# Patient Record
Sex: MALE | Race: OTHER | HISPANIC OR LATINO | URBAN - METROPOLITAN AREA
[De-identification: names, ages, dates, MRNs, and addresses within clinical notes are randomized per-mention and may not be internally consistent; named-entity substitution may affect disease eponyms.]

---

## 2020-07-16 ENCOUNTER — INPATIENT (INPATIENT)
Facility: HOSPITAL | Age: 70
LOS: 5 days | Discharge: HOME | DRG: 64 | End: 2020-07-22
Attending: INTERNAL MEDICINE | Admitting: INTERNAL MEDICINE
Payer: MEDICARE

## 2020-07-16 VITALS
RESPIRATION RATE: 20 BRPM | HEART RATE: 61 BPM | OXYGEN SATURATION: 99 % | SYSTOLIC BLOOD PRESSURE: 174 MMHG | DIASTOLIC BLOOD PRESSURE: 91 MMHG | TEMPERATURE: 98 F | HEIGHT: 68 IN | WEIGHT: 154.32 LBS

## 2020-07-16 DIAGNOSIS — R20.0 ANESTHESIA OF SKIN: ICD-10-CM

## 2020-07-16 DIAGNOSIS — I10 ESSENTIAL (PRIMARY) HYPERTENSION: ICD-10-CM

## 2020-07-16 DIAGNOSIS — G20 PARKINSON'S DISEASE: ICD-10-CM

## 2020-07-16 DIAGNOSIS — Z29.9 ENCOUNTER FOR PROPHYLACTIC MEASURES, UNSPECIFIED: ICD-10-CM

## 2020-07-16 LAB
ALBUMIN SERPL ELPH-MCNC: 3.7 G/DL — SIGNIFICANT CHANGE UP (ref 3.5–5)
ALP SERPL-CCNC: 56 U/L — SIGNIFICANT CHANGE UP (ref 40–120)
ALT FLD-CCNC: 10 U/L DA — SIGNIFICANT CHANGE UP (ref 10–60)
ANION GAP SERPL CALC-SCNC: 5 MMOL/L — SIGNIFICANT CHANGE UP (ref 5–17)
APTT BLD: 30.1 SEC — SIGNIFICANT CHANGE UP (ref 27.5–35.5)
AST SERPL-CCNC: 17 U/L — SIGNIFICANT CHANGE UP (ref 10–40)
BASOPHILS # BLD AUTO: 0.05 K/UL — SIGNIFICANT CHANGE UP (ref 0–0.2)
BASOPHILS NFR BLD AUTO: 0.8 % — SIGNIFICANT CHANGE UP (ref 0–2)
BILIRUB SERPL-MCNC: 0.5 MG/DL — SIGNIFICANT CHANGE UP (ref 0.2–1.2)
BUN SERPL-MCNC: 13 MG/DL — SIGNIFICANT CHANGE UP (ref 7–18)
CALCIUM SERPL-MCNC: 8.8 MG/DL — SIGNIFICANT CHANGE UP (ref 8.4–10.5)
CHLORIDE SERPL-SCNC: 106 MMOL/L — SIGNIFICANT CHANGE UP (ref 96–108)
CO2 SERPL-SCNC: 29 MMOL/L — SIGNIFICANT CHANGE UP (ref 22–31)
CREAT SERPL-MCNC: 0.95 MG/DL — SIGNIFICANT CHANGE UP (ref 0.5–1.3)
EOSINOPHIL # BLD AUTO: 0.12 K/UL — SIGNIFICANT CHANGE UP (ref 0–0.5)
EOSINOPHIL NFR BLD AUTO: 1.8 % — SIGNIFICANT CHANGE UP (ref 0–6)
GLUCOSE SERPL-MCNC: 102 MG/DL — HIGH (ref 70–99)
HCT VFR BLD CALC: 45.9 % — SIGNIFICANT CHANGE UP (ref 39–50)
HGB BLD-MCNC: 15.4 G/DL — SIGNIFICANT CHANGE UP (ref 13–17)
IMM GRANULOCYTES NFR BLD AUTO: 0.2 % — SIGNIFICANT CHANGE UP (ref 0–1.5)
INR BLD: 1.13 RATIO — SIGNIFICANT CHANGE UP (ref 0.88–1.16)
LYMPHOCYTES # BLD AUTO: 1.4 K/UL — SIGNIFICANT CHANGE UP (ref 1–3.3)
LYMPHOCYTES # BLD AUTO: 21.3 % — SIGNIFICANT CHANGE UP (ref 13–44)
MAGNESIUM SERPL-MCNC: 2.2 MG/DL — SIGNIFICANT CHANGE UP (ref 1.6–2.6)
MCHC RBC-ENTMCNC: 29.2 PG — SIGNIFICANT CHANGE UP (ref 27–34)
MCHC RBC-ENTMCNC: 33.6 GM/DL — SIGNIFICANT CHANGE UP (ref 32–36)
MCV RBC AUTO: 86.9 FL — SIGNIFICANT CHANGE UP (ref 80–100)
MONOCYTES # BLD AUTO: 0.44 K/UL — SIGNIFICANT CHANGE UP (ref 0–0.9)
MONOCYTES NFR BLD AUTO: 6.7 % — SIGNIFICANT CHANGE UP (ref 2–14)
NEUTROPHILS # BLD AUTO: 4.56 K/UL — SIGNIFICANT CHANGE UP (ref 1.8–7.4)
NEUTROPHILS NFR BLD AUTO: 69.2 % — SIGNIFICANT CHANGE UP (ref 43–77)
NRBC # BLD: 0 /100 WBCS — SIGNIFICANT CHANGE UP (ref 0–0)
PLATELET # BLD AUTO: 193 K/UL — SIGNIFICANT CHANGE UP (ref 150–400)
POTASSIUM SERPL-MCNC: 3.9 MMOL/L — SIGNIFICANT CHANGE UP (ref 3.5–5.3)
POTASSIUM SERPL-SCNC: 3.9 MMOL/L — SIGNIFICANT CHANGE UP (ref 3.5–5.3)
PROT SERPL-MCNC: 7.2 G/DL — SIGNIFICANT CHANGE UP (ref 6–8.3)
PROTHROM AB SERPL-ACNC: 13.1 SEC — SIGNIFICANT CHANGE UP (ref 10.6–13.6)
RBC # BLD: 5.28 M/UL — SIGNIFICANT CHANGE UP (ref 4.2–5.8)
RBC # FLD: 12.2 % — SIGNIFICANT CHANGE UP (ref 10.3–14.5)
SARS-COV-2 RNA SPEC QL NAA+PROBE: SIGNIFICANT CHANGE UP
SODIUM SERPL-SCNC: 140 MMOL/L — SIGNIFICANT CHANGE UP (ref 135–145)
TROPONIN I SERPL-MCNC: <0.015 NG/ML — SIGNIFICANT CHANGE UP (ref 0–0.04)
WBC # BLD: 6.58 K/UL — SIGNIFICANT CHANGE UP (ref 3.8–10.5)
WBC # FLD AUTO: 6.58 K/UL — SIGNIFICANT CHANGE UP (ref 3.8–10.5)

## 2020-07-16 PROCEDURE — 70496 CT ANGIOGRAPHY HEAD: CPT | Mod: 26

## 2020-07-16 PROCEDURE — 70498 CT ANGIOGRAPHY NECK: CPT | Mod: 26

## 2020-07-16 PROCEDURE — 99285 EMERGENCY DEPT VISIT HI MDM: CPT

## 2020-07-16 RX ORDER — LOSARTAN POTASSIUM 100 MG/1
25 TABLET, FILM COATED ORAL DAILY
Refills: 0 | Status: DISCONTINUED | OUTPATIENT
Start: 2020-07-16 | End: 2020-07-16

## 2020-07-16 RX ORDER — ASPIRIN/CALCIUM CARB/MAGNESIUM 324 MG
325 TABLET ORAL ONCE
Refills: 0 | Status: COMPLETED | OUTPATIENT
Start: 2020-07-16 | End: 2020-07-16

## 2020-07-16 RX ORDER — ATORVASTATIN CALCIUM 80 MG/1
40 TABLET, FILM COATED ORAL AT BEDTIME
Refills: 0 | Status: DISCONTINUED | OUTPATIENT
Start: 2020-07-16 | End: 2020-07-22

## 2020-07-16 RX ORDER — ENOXAPARIN SODIUM 100 MG/ML
70 INJECTION SUBCUTANEOUS EVERY 12 HOURS
Refills: 0 | Status: DISCONTINUED | OUTPATIENT
Start: 2020-07-16 | End: 2020-07-17

## 2020-07-16 RX ORDER — CLOPIDOGREL BISULFATE 75 MG/1
300 TABLET, FILM COATED ORAL ONCE
Refills: 0 | Status: COMPLETED | OUTPATIENT
Start: 2020-07-16 | End: 2020-07-16

## 2020-07-16 RX ORDER — BENZTROPINE MESYLATE 1 MG
0.5 TABLET ORAL AT BEDTIME
Refills: 0 | Status: DISCONTINUED | OUTPATIENT
Start: 2020-07-16 | End: 2020-07-22

## 2020-07-16 RX ORDER — ENOXAPARIN SODIUM 100 MG/ML
70 INJECTION SUBCUTANEOUS
Refills: 0 | Status: DISCONTINUED | OUTPATIENT
Start: 2020-07-16 | End: 2020-07-16

## 2020-07-16 RX ADMIN — Medication 325 MILLIGRAM(S): at 18:47

## 2020-07-16 RX ADMIN — CLOPIDOGREL BISULFATE 300 MILLIGRAM(S): 75 TABLET, FILM COATED ORAL at 18:47

## 2020-07-16 NOTE — H&P ADULT - PROBLEM SELECTOR PLAN 1
-p/w L arm and leg tingling  -CT Angio H& N: Linear filling defect in the distal right cervical ICA suspicious for a dissection.  -Neurosurgeon Dr. Dey at [facility] was consulted who recommended medical management  -will start on full dose lovenox and statin  Goal -140 mm Hg to prevent stroke  neuro: Dr. Barrow

## 2020-07-16 NOTE — H&P ADULT - PROBLEM SELECTOR PLAN 2
-on losartan and metoprolol at home  -Goal -140 mm Hg to prevent stroke  Pt will need A line for BP monitoring   Hold BP medications

## 2020-07-16 NOTE — ED PROVIDER NOTE - OBJECTIVE STATEMENT
71 y/o male h/o HTN, HLD, and Parkinson's who presents with numbness to the left side that started yesterday around 8pm. Patient thought it would get better but it did not so he came to the ED. No weakness. Patient denies anything similar in the past.

## 2020-07-16 NOTE — ED PROVIDER NOTE - CLINICAL SUMMARY MEDICAL DECISION MAKING FREE TEXT BOX
pt presents with left sided numbness since yesterday.  Will check labs, CTA head and neck, and reassess.

## 2020-07-16 NOTE — ED PROVIDER NOTE - PROGRESS NOTE DETAILS
CTA shows questionable dissection.  case discussed with transfer center -  Neurosurgery - Dr. Dey/  Recommend aspirin, plavix, and MRI as inpatient to r/o stroke.  Possible dissection is not flow limiting. CTA shows questionable dissection.  case discussed with transfer center -  Neurosurgery - Dr. Dey.   Recommend aspirin, plavix, and MRI as inpatient to r/o stroke.  Possible dissection seen on CT is not flow limiting.

## 2020-07-16 NOTE — H&P ADULT - ASSESSMENT
69 y/o male with PMH of HTN, HLD and Parkinson disease come to ED s with numbness to the left arm that started yesterday around 8pm. Patient thought it would get better but it did not so he came to the ED. Pt denies any upper or lower extremity weakness, slurring speech, confusion blurred vison, chest pain, palpitations, fever, cough or any other acute complaints.     In ED, /65, HR 53  EKG: sinus bradycardia  CT Angio H& N: Linear filling defect in the distal right cervical ICA suspicious for a dissection.  Neurosurgeon Dr. Dey at [facility] was consulted who recommended medical management.     Pt will be admitted for distal right cervical ICA dissection.

## 2020-07-16 NOTE — ED ADULT NURSE NOTE - OBJECTIVE STATEMENT
Numbness to whole of left side x 8 pm last night just after dinner. Also c/o dizziness, slurred speech. Has h/o Parkinson.

## 2020-07-16 NOTE — ED ADULT TRIAGE NOTE - CHIEF COMPLAINT QUOTE
Numbness to whole of left side x 8 pm last night just after dinner. Also c/o dizziness, slurred speech. Has h/o Parkinsons.

## 2020-07-16 NOTE — ED PROVIDER NOTE - TELEPHONIC ID NUMBER OF THE INTERPRETER
Type 2 diabetes mellitus with ketoacidosis without coma, unspecified long term insulin use status Diabetes mellitus with hyperglycemia Diabetes mellitus with hyperglycemia [id number]

## 2020-07-16 NOTE — H&P ADULT - HISTORY OF PRESENT ILLNESS
69 y/o male with PMH of HTN, HLD and Parkinson disease come to ED s with numbness to the left arm that started yesterday around 8pm. Patient thought it would get better but it did not so he came to the ED. Pt denies any recent trauma to neck. Pt denies any upper or lower extremity weakness, slurring speech, confusion blurred vison, chest pain, palpitations, fever, cough or any other acute complaints.   In ED, /65, HR 53

## 2020-07-17 LAB
A1C WITH ESTIMATED AVERAGE GLUCOSE RESULT: 5.8 % — HIGH (ref 4–5.6)
ALBUMIN SERPL ELPH-MCNC: 3.5 G/DL — SIGNIFICANT CHANGE UP (ref 3.5–5)
ALP SERPL-CCNC: 52 U/L — SIGNIFICANT CHANGE UP (ref 40–120)
ALT FLD-CCNC: 22 U/L DA — SIGNIFICANT CHANGE UP (ref 10–60)
ANION GAP SERPL CALC-SCNC: 10 MMOL/L — SIGNIFICANT CHANGE UP (ref 5–17)
AST SERPL-CCNC: 19 U/L — SIGNIFICANT CHANGE UP (ref 10–40)
BASOPHILS # BLD AUTO: 0.04 K/UL — SIGNIFICANT CHANGE UP (ref 0–0.2)
BASOPHILS NFR BLD AUTO: 0.6 % — SIGNIFICANT CHANGE UP (ref 0–2)
BILIRUB SERPL-MCNC: 0.4 MG/DL — SIGNIFICANT CHANGE UP (ref 0.2–1.2)
BUN SERPL-MCNC: 14 MG/DL — SIGNIFICANT CHANGE UP (ref 7–18)
CALCIUM SERPL-MCNC: 8.3 MG/DL — LOW (ref 8.4–10.5)
CHLORIDE SERPL-SCNC: 105 MMOL/L — SIGNIFICANT CHANGE UP (ref 96–108)
CHOLEST SERPL-MCNC: 136 MG/DL — SIGNIFICANT CHANGE UP (ref 10–199)
CO2 SERPL-SCNC: 23 MMOL/L — SIGNIFICANT CHANGE UP (ref 22–31)
CREAT SERPL-MCNC: 0.95 MG/DL — SIGNIFICANT CHANGE UP (ref 0.5–1.3)
EOSINOPHIL # BLD AUTO: 0.08 K/UL — SIGNIFICANT CHANGE UP (ref 0–0.5)
EOSINOPHIL NFR BLD AUTO: 1.2 % — SIGNIFICANT CHANGE UP (ref 0–6)
ESTIMATED AVERAGE GLUCOSE: 120 MG/DL — HIGH (ref 68–114)
FOLATE SERPL-MCNC: 7.2 NG/ML — SIGNIFICANT CHANGE UP
GLUCOSE BLDC GLUCOMTR-MCNC: 115 MG/DL — HIGH (ref 70–99)
GLUCOSE SERPL-MCNC: 129 MG/DL — HIGH (ref 70–99)
HCT VFR BLD CALC: 46.7 % — SIGNIFICANT CHANGE UP (ref 39–50)
HDLC SERPL-MCNC: 45 MG/DL — SIGNIFICANT CHANGE UP
HGB BLD-MCNC: 15.5 G/DL — SIGNIFICANT CHANGE UP (ref 13–17)
IMM GRANULOCYTES NFR BLD AUTO: 0.2 % — SIGNIFICANT CHANGE UP (ref 0–1.5)
INR BLD: 1.16 RATIO — SIGNIFICANT CHANGE UP (ref 0.88–1.16)
LIPID PNL WITH DIRECT LDL SERPL: 78 MG/DL — SIGNIFICANT CHANGE UP
LYMPHOCYTES # BLD AUTO: 1.02 K/UL — SIGNIFICANT CHANGE UP (ref 1–3.3)
LYMPHOCYTES # BLD AUTO: 15.6 % — SIGNIFICANT CHANGE UP (ref 13–44)
MAGNESIUM SERPL-MCNC: 2.2 MG/DL — SIGNIFICANT CHANGE UP (ref 1.6–2.6)
MCHC RBC-ENTMCNC: 29 PG — SIGNIFICANT CHANGE UP (ref 27–34)
MCHC RBC-ENTMCNC: 33.2 GM/DL — SIGNIFICANT CHANGE UP (ref 32–36)
MCV RBC AUTO: 87.3 FL — SIGNIFICANT CHANGE UP (ref 80–100)
MONOCYTES # BLD AUTO: 0.38 K/UL — SIGNIFICANT CHANGE UP (ref 0–0.9)
MONOCYTES NFR BLD AUTO: 5.8 % — SIGNIFICANT CHANGE UP (ref 2–14)
NEUTROPHILS # BLD AUTO: 5 K/UL — SIGNIFICANT CHANGE UP (ref 1.8–7.4)
NEUTROPHILS NFR BLD AUTO: 76.6 % — SIGNIFICANT CHANGE UP (ref 43–77)
NRBC # BLD: 0 /100 WBCS — SIGNIFICANT CHANGE UP (ref 0–0)
PHOSPHATE SERPL-MCNC: 2.8 MG/DL — SIGNIFICANT CHANGE UP (ref 2.5–4.5)
PLATELET # BLD AUTO: 193 K/UL — SIGNIFICANT CHANGE UP (ref 150–400)
POTASSIUM SERPL-MCNC: 4 MMOL/L — SIGNIFICANT CHANGE UP (ref 3.5–5.3)
POTASSIUM SERPL-SCNC: 4 MMOL/L — SIGNIFICANT CHANGE UP (ref 3.5–5.3)
PROT SERPL-MCNC: 7.2 G/DL — SIGNIFICANT CHANGE UP (ref 6–8.3)
PROTHROM AB SERPL-ACNC: 13.5 SEC — SIGNIFICANT CHANGE UP (ref 10.6–13.6)
RBC # BLD: 5.35 M/UL — SIGNIFICANT CHANGE UP (ref 4.2–5.8)
RBC # FLD: 12.2 % — SIGNIFICANT CHANGE UP (ref 10.3–14.5)
SARS-COV-2 IGG SERPL QL IA: NEGATIVE — SIGNIFICANT CHANGE UP
SARS-COV-2 IGM SERPL IA-ACNC: 4.55 AU/ML — SIGNIFICANT CHANGE UP
SODIUM SERPL-SCNC: 138 MMOL/L — SIGNIFICANT CHANGE UP (ref 135–145)
TOTAL CHOLESTEROL/HDL RATIO MEASUREMENT: 3 RATIO — LOW (ref 3.4–9.6)
TRIGL SERPL-MCNC: 64 MG/DL — SIGNIFICANT CHANGE UP (ref 10–149)
TSH SERPL-MCNC: 1.87 UU/ML — SIGNIFICANT CHANGE UP (ref 0.34–4.82)
VIT B12 SERPL-MCNC: 633 PG/ML — SIGNIFICANT CHANGE UP (ref 232–1245)
WBC # BLD: 6.53 K/UL — SIGNIFICANT CHANGE UP (ref 3.8–10.5)
WBC # FLD AUTO: 6.53 K/UL — SIGNIFICANT CHANGE UP (ref 3.8–10.5)

## 2020-07-17 PROCEDURE — 93880 EXTRACRANIAL BILAT STUDY: CPT | Mod: 26

## 2020-07-17 PROCEDURE — 99222 1ST HOSP IP/OBS MODERATE 55: CPT | Mod: GC

## 2020-07-17 PROCEDURE — 99291 CRITICAL CARE FIRST HOUR: CPT

## 2020-07-17 PROCEDURE — 99221 1ST HOSP IP/OBS SF/LOW 40: CPT

## 2020-07-17 PROCEDURE — 71045 X-RAY EXAM CHEST 1 VIEW: CPT | Mod: 26

## 2020-07-17 RX ORDER — CARBIDOPA AND LEVODOPA 25; 100 MG/1; MG/1
3 TABLET ORAL
Refills: 0 | Status: DISCONTINUED | OUTPATIENT
Start: 2020-07-17 | End: 2020-07-22

## 2020-07-17 RX ORDER — SODIUM CHLORIDE 9 MG/ML
1000 INJECTION INTRAMUSCULAR; INTRAVENOUS; SUBCUTANEOUS
Refills: 0 | Status: DISCONTINUED | OUTPATIENT
Start: 2020-07-17 | End: 2020-07-18

## 2020-07-17 RX ORDER — CARBIDOPA AND LEVODOPA 25; 100 MG/1; MG/1
3 TABLET ORAL THREE TIMES A DAY
Refills: 0 | Status: DISCONTINUED | OUTPATIENT
Start: 2020-07-17 | End: 2020-07-17

## 2020-07-17 RX ORDER — KETOROLAC TROMETHAMINE 30 MG/ML
15 SYRINGE (ML) INJECTION ONCE
Refills: 0 | Status: DISCONTINUED | OUTPATIENT
Start: 2020-07-17 | End: 2020-07-17

## 2020-07-17 RX ORDER — PHENYLEPHRINE HYDROCHLORIDE 10 MG/ML
0.1 INJECTION INTRAVENOUS
Qty: 40 | Refills: 0 | Status: DISCONTINUED | OUTPATIENT
Start: 2020-07-17 | End: 2020-07-19

## 2020-07-17 RX ORDER — ACETAMINOPHEN 500 MG
650 TABLET ORAL EVERY 6 HOURS
Refills: 0 | Status: DISCONTINUED | OUTPATIENT
Start: 2020-07-17 | End: 2020-07-22

## 2020-07-17 RX ORDER — SODIUM CHLORIDE 9 MG/ML
1000 INJECTION INTRAMUSCULAR; INTRAVENOUS; SUBCUTANEOUS
Refills: 0 | Status: DISCONTINUED | OUTPATIENT
Start: 2020-07-17 | End: 2020-07-17

## 2020-07-17 RX ORDER — ENOXAPARIN SODIUM 100 MG/ML
70 INJECTION SUBCUTANEOUS EVERY 12 HOURS
Refills: 0 | Status: DISCONTINUED | OUTPATIENT
Start: 2020-07-17 | End: 2020-07-18

## 2020-07-17 RX ORDER — CHLORHEXIDINE GLUCONATE 213 G/1000ML
1 SOLUTION TOPICAL
Refills: 0 | Status: DISCONTINUED | OUTPATIENT
Start: 2020-07-17 | End: 2020-07-21

## 2020-07-17 RX ORDER — ACETAMINOPHEN 500 MG
1000 TABLET ORAL ONCE
Refills: 0 | Status: COMPLETED | OUTPATIENT
Start: 2020-07-17 | End: 2020-07-17

## 2020-07-17 RX ORDER — PANTOPRAZOLE SODIUM 20 MG/1
40 TABLET, DELAYED RELEASE ORAL
Refills: 0 | Status: DISCONTINUED | OUTPATIENT
Start: 2020-07-17 | End: 2020-07-22

## 2020-07-17 RX ADMIN — Medication 15 MILLIGRAM(S): at 02:00

## 2020-07-17 RX ADMIN — CHLORHEXIDINE GLUCONATE 1 APPLICATION(S): 213 SOLUTION TOPICAL at 05:13

## 2020-07-17 RX ADMIN — ATORVASTATIN CALCIUM 40 MILLIGRAM(S): 80 TABLET, FILM COATED ORAL at 21:09

## 2020-07-17 RX ADMIN — CARBIDOPA AND LEVODOPA 3 CAPSULE(S): 25; 100 TABLET ORAL at 05:52

## 2020-07-17 RX ADMIN — PANTOPRAZOLE SODIUM 40 MILLIGRAM(S): 20 TABLET, DELAYED RELEASE ORAL at 05:52

## 2020-07-17 RX ADMIN — CARBIDOPA AND LEVODOPA 3 CAPSULE(S): 25; 100 TABLET ORAL at 14:09

## 2020-07-17 RX ADMIN — Medication 400 MILLIGRAM(S): at 14:09

## 2020-07-17 RX ADMIN — Medication 0.5 MILLIGRAM(S): at 21:09

## 2020-07-17 RX ADMIN — ENOXAPARIN SODIUM 70 MILLIGRAM(S): 100 INJECTION SUBCUTANEOUS at 15:00

## 2020-07-17 RX ADMIN — CARBIDOPA AND LEVODOPA 3 CAPSULE(S): 25; 100 TABLET ORAL at 18:51

## 2020-07-17 RX ADMIN — Medication 1000 MILLIGRAM(S): at 15:00

## 2020-07-17 RX ADMIN — Medication 15 MILLIGRAM(S): at 01:29

## 2020-07-17 RX ADMIN — PHENYLEPHRINE HYDROCHLORIDE 2.63 MICROGRAM(S)/KG/MIN: 10 INJECTION INTRAVENOUS at 09:40

## 2020-07-17 RX ADMIN — PHENYLEPHRINE HYDROCHLORIDE 2.63 MICROGRAM(S)/KG/MIN: 10 INJECTION INTRAVENOUS at 09:30

## 2020-07-17 NOTE — CONSULT NOTE ADULT - ASSESSMENT
A/P: Carotid dissection SHIREEN with stroke    - No IV tpa given because beyond time window and non-disabling deficits  - Lovenox initially full anticoagulation protocol  - ASA/ PLavix after 48-72 h Lovenox  - Maintain permissive BP: parameters -130. Low pulse pressure  - Statin  - Dysphagia screen  - DVT prophylaxis not needed as he is on Lovenox  - MRI/A brain  - MRA carotids after 72 h  -  Total Critical Care Time spent:

## 2020-07-17 NOTE — PROGRESS NOTE ADULT - SUBJECTIVE AND OBJECTIVE BOX
INTERVAL HPI/OVERNIGHT EVENTS: ***    PRESSORS: [ ] YES [ ] NO  WHICH:        Hematalogic:  enoxaparin Injectable 70 milliGRAM(s) SubCutaneous every 12 hours    Other:  acetaminophen   Tablet .. 650 milliGRAM(s) Oral every 6 hours PRN  atorvastatin 40 milliGRAM(s) Oral at bedtime  benztropine 0.5 milliGRAM(s) Oral at bedtime  carbidopa 48.75 mG/levodopa 195 mG ER 3 Capsule(s) Oral three times a day  chlorhexidine 2% Cloths 1 Application(s) Topical <User Schedule>  pantoprazole    Tablet 40 milliGRAM(s) Oral before breakfast  sodium chloride 0.9%. 1000 milliLiter(s) IV Continuous <Continuous>    acetaminophen   Tablet .. 650 milliGRAM(s) Oral every 6 hours PRN  atorvastatin 40 milliGRAM(s) Oral at bedtime  benztropine 0.5 milliGRAM(s) Oral at bedtime  carbidopa 48.75 mG/levodopa 195 mG ER 3 Capsule(s) Oral three times a day  chlorhexidine 2% Cloths 1 Application(s) Topical <User Schedule>  enoxaparin Injectable 70 milliGRAM(s) SubCutaneous every 12 hours  pantoprazole    Tablet 40 milliGRAM(s) Oral before breakfast  sodium chloride 0.9%. 1000 milliLiter(s) IV Continuous <Continuous>    Drug Dosing Weight  Height (cm): 172.72 (16 Jul 2020 14:26)  Weight (kg): 70 (16 Jul 2020 14:26)  BMI (kg/m2): 23.5 (16 Jul 2020 14:26)  BSA (m2): 1.83 (16 Jul 2020 14:26)    CENTRAL LINE: [ ] YES [ ] NO  LOCATION:   DATE INSERTED:  REMOVE: [ ] YES [ ] NO  EXPLAIN:    CHAKRABORTY: [ ] YES [ ] NO    DATE INSERTED:  REMOVE:  [ ] YES [ ] NO  EXPLAIN:    A-LINE:  [ ] YES [ ] NO  LOCATION:   DATE INSERTED:  REMOVE:  [ ] YES [ ] NO  EXPLAIN:    PMH -reviewed admission note, no change since admission  Heart faliure: acute [ ] chronic [ ] acute or chronic [ ] diastolic [ ] systolic [ ] combied systolic and diastolic[ ]  AYALA: ATN[ ] renal medullary necrosis [ ] CKD I [ ]CKDII [ ]CKD III [ ]CKD IV [ ]CKD V [ ]Other pathological lesions [ ]  Abdominal Nutrition Status: malnutrition [ ] cachexia [ ] morbid obesity/BMI=40 [ ] Supplement ordered [___________]     ICU Vital Signs Last 24 Hrs  T(C): 36.7 (17 Jul 2020 05:00), Max: 37 (16 Jul 2020 17:01)  T(F): 98 (17 Jul 2020 05:00), Max: 98.6 (16 Jul 2020 17:01)  HR: 62 (17 Jul 2020 09:00) (53 - 88)  BP: 156/73 (17 Jul 2020 08:00) (148/72 - 179/83)  BP(mean): 92 (17 Jul 2020 08:00) (90 - 108)  ABP: 130/66 (17 Jul 2020 09:00) (130/66 - 202/104)  ABP(mean): 90 (17 Jul 2020 09:00) (70 - 146)  RR: 14 (17 Jul 2020 09:00) (11 - 22)  SpO2: 98% (17 Jul 2020 09:00) (93% - 100%)                  >>> <<<    PHYSICAL EXAM:    GENERAL: NAD, well-groomed, well-developed  HEAD:  Atraumatic, Normocephalic  EYES: EOMI, PERRLA, conjunctiva and sclera clear  ENMT: No tonsillar erythema, exudates, or enlargement; Moist mucous membranes, Good dentition, No lesions  NECK: Supple, normal appearance, No JVD; Normal thyroid; Trachea midline  NERVOUS SYSTEM:  Alert & Oriented X3, Good concentration; Motor Strength 5/5 B/L upper and lower extremities; DTRs 2+ intact and symmetric  CHEST/LUNG: No chest deformity; Normal percussion bilaterally; No rales, rhonchi, wheezing   HEART: Regular rate and rhythm; No murmurs, rubs, or gallops  ABDOMEN: Soft, Nontender, Nondistended; Bowel sounds present  EXTREMITIES:  2+ Peripheral Pulses, No clubbing, cyanosis, or edema  LYMPH: No lymphadenopathy noted  SKIN: No rashes or lesions; Good capillary refill      LABS:  CBC Full  -  ( 17 Jul 2020 06:53 )  WBC Count : 6.53 K/uL  RBC Count : 5.35 M/uL  Hemoglobin : 15.5 g/dL  Hematocrit : 46.7 %  Platelet Count - Automated : 193 K/uL  Mean Cell Volume : 87.3 fl  Mean Cell Hemoglobin : 29.0 pg  Mean Cell Hemoglobin Concentration : 33.2 gm/dL  Auto Neutrophil # : 5.00 K/uL  Auto Lymphocyte # : 1.02 K/uL  Auto Monocyte # : 0.38 K/uL  Auto Eosinophil # : 0.08 K/uL  Auto Basophil # : 0.04 K/uL  Auto Neutrophil % : 76.6 %  Auto Lymphocyte % : 15.6 %  Auto Monocyte % : 5.8 %  Auto Eosinophil % : 1.2 %  Auto Basophil % : 0.6 %    07-17    138  |  105  |  14  ----------------------------<  129<H>  4.0   |  23  |  0.95    Ca    8.3<L>      17 Jul 2020 06:53  Phos  2.8     07-17  Mg     2.2     07-17    TPro  7.2  /  Alb  3.5  /  TBili  0.4  /  DBili  x   /  AST  19  /  ALT  22  /  AlkPhos  52  07-17    PT/INR - ( 17 Jul 2020 06:53 )   PT: 13.5 sec;   INR: 1.16 ratio         PTT - ( 16 Jul 2020 15:43 )  PTT:30.1 sec        RADIOLOGY & ADDITIONAL STUDIES REVIEWED:  ***    [ ]GOALS OF CARE DISCUSSION WITH PATIENT/FAMILY/PROXY:    CRITICAL CARE TIME SPENT: 35 minutes INTERVAL HPI/OVERNIGHT EVENTS: MAP monitored overnight > 100. Pheny started in am as pt's MAP <100     PRESSORS: [ x] YES [ ] NO  WHICH: Pheny         Hematalogic:  enoxaparin Injectable 70 milliGRAM(s) SubCutaneous every 12 hours    Other:  acetaminophen   Tablet .. 650 milliGRAM(s) Oral every 6 hours PRN  atorvastatin 40 milliGRAM(s) Oral at bedtime  benztropine 0.5 milliGRAM(s) Oral at bedtime  carbidopa 48.75 mG/levodopa 195 mG ER 3 Capsule(s) Oral three times a day  chlorhexidine 2% Cloths 1 Application(s) Topical <User Schedule>  pantoprazole    Tablet 40 milliGRAM(s) Oral before breakfast  sodium chloride 0.9%. 1000 milliLiter(s) IV Continuous <Continuous>    acetaminophen   Tablet .. 650 milliGRAM(s) Oral every 6 hours PRN  atorvastatin 40 milliGRAM(s) Oral at bedtime  benztropine 0.5 milliGRAM(s) Oral at bedtime  carbidopa 48.75 mG/levodopa 195 mG ER 3 Capsule(s) Oral three times a day  chlorhexidine 2% Cloths 1 Application(s) Topical <User Schedule>  enoxaparin Injectable 70 milliGRAM(s) SubCutaneous every 12 hours  pantoprazole    Tablet 40 milliGRAM(s) Oral before breakfast  sodium chloride 0.9%. 1000 milliLiter(s) IV Continuous <Continuous>    Drug Dosing Weight  Height (cm): 172.72 (16 Jul 2020 14:26)  Weight (kg): 70 (16 Jul 2020 14:26)  BMI (kg/m2): 23.5 (16 Jul 2020 14:26)  BSA (m2): 1.83 (16 Jul 2020 14:26)    CENTRAL LINE: [ ] YES [ x] NO  LOCATION:   DATE INSERTED:  REMOVE: [ ] YES [ ] NO  EXPLAIN:    CHAKRABORTY: [ ] YES [x ] NO    DATE INSERTED:  REMOVE:  [ ] YES [ ] NO  EXPLAIN:    A-LINE:  [ x] YES [ ] NO  LOCATION:  right radial  DATE INSERTED:7/17  REMOVE:  [ ] YES [ ] NO  EXPLAIN:      ICU Vital Signs Last 24 Hrs  T(C): 36.7 (17 Jul 2020 05:00), Max: 37 (16 Jul 2020 17:01)  T(F): 98 (17 Jul 2020 05:00), Max: 98.6 (16 Jul 2020 17:01)  HR: 62 (17 Jul 2020 09:00) (53 - 88)  BP: 156/73 (17 Jul 2020 08:00) (148/72 - 179/83)  BP(mean): 92 (17 Jul 2020 08:00) (90 - 108)  ABP: 130/66 (17 Jul 2020 09:00) (130/66 - 202/104)  ABP(mean): 90 (17 Jul 2020 09:00) (70 - 146)  RR: 14 (17 Jul 2020 09:00) (11 - 22)  SpO2: 98% (17 Jul 2020 09:00) (93% - 100%)                  PHYSICAL EXAM:    GENERAL: NAD, well-groomed, well-developed  HEAD:  Atraumatic, Normocephalic  EYES: EOMI, PERRLA, conjunctiva and sclera clear  ENMT: No tonsillar erythema, exudates, or enlargement; Moist mucous membranes, Good dentition, No lesions  NECK: Supple, normal appearance, No JVD; Normal thyroid; Trachea midline  NERVOUS SYSTEM:  Alert & Oriented X3, Good concentration; mild tremor noted. numbness in LUE   CHEST/LUNG: No chest deformity; Normal percussion bilaterally; No rales, rhonchi, wheezing   HEART: Regular rate and rhythm; No murmurs, rubs, or gallops  ABDOMEN: Soft, Nontender, Nondistended; Bowel sounds present  EXTREMITIES:  LUE 4/5 STRENGTH, bLE 5/5.   LYMPH: No lymphadenopathy noted  SKIN: No rashes or lesions; Good capillary refill      LABS:  CBC Full  -  ( 17 Jul 2020 06:53 )  WBC Count : 6.53 K/uL  RBC Count : 5.35 M/uL  Hemoglobin : 15.5 g/dL  Hematocrit : 46.7 %  Platelet Count - Automated : 193 K/uL  Mean Cell Volume : 87.3 fl  Mean Cell Hemoglobin : 29.0 pg  Mean Cell Hemoglobin Concentration : 33.2 gm/dL  Auto Neutrophil # : 5.00 K/uL  Auto Lymphocyte # : 1.02 K/uL  Auto Monocyte # : 0.38 K/uL  Auto Eosinophil # : 0.08 K/uL  Auto Basophil # : 0.04 K/uL  Auto Neutrophil % : 76.6 %  Auto Lymphocyte % : 15.6 %  Auto Monocyte % : 5.8 %  Auto Eosinophil % : 1.2 %  Auto Basophil % : 0.6 %    07-17    138  |  105  |  14  ----------------------------<  129<H>  4.0   |  23  |  0.95    Ca    8.3<L>      17 Jul 2020 06:53  Phos  2.8     07-17  Mg     2.2     07-17    TPro  7.2  /  Alb  3.5  /  TBili  0.4  /  DBili  x   /  AST  19  /  ALT  22  /  AlkPhos  52  07-17    PT/INR - ( 17 Jul 2020 06:53 )   PT: 13.5 sec;   INR: 1.16 ratio         PTT - ( 16 Jul 2020 15:43 )  PTT:30.1 sec        RADIOLOGY & ADDITIONAL STUDIES REVIEWED:  ***    [ ]GOALS OF CARE DISCUSSION WITH PATIENT/FAMILY/PROXY:    CRITICAL CARE TIME SPENT: 35 minutes

## 2020-07-17 NOTE — PROGRESS NOTE ADULT - SUBJECTIVE AND OBJECTIVE BOX
Patient is a 70y old  Male who presents with a chief complaint of right cervical ICA dissection (17 Jul 2020 00:37)    pt seen in icu [  ], reg med floor [   ], bed [  ], chair at bedside [   ], a+o x3 [  ], lethargic [  ],  nad [  ]    bello [  ], ngt [  ], peg [  ], et tube [  ], cent line [  ], picc line [  ]        Allergies    No Known Allergies        Vitals    T(F): 98 (07-17-20 @ 05:00), Max: 98.6 (07-16-20 @ 17:01)  HR: 68 (07-17-20 @ 05:30) (53 - 88)  BP: 162/77 (07-17-20 @ 04:00) (148/72 - 179/83)  RR: 14 (07-17-20 @ 05:30) (12 - 22)  SpO2: 97% (07-17-20 @ 05:30) (93% - 100%)  Wt(kg): --  CAPILLARY BLOOD GLUCOSE      POCT Blood Glucose.: 111 mg/dL (16 Jul 2020 14:50)      Labs                          15.4   6.58  )-----------( 193      ( 16 Jul 2020 15:43 )             45.9       07-16    140  |  106  |  13  ----------------------------<  102<H>  3.9   |  29  |  0.95    Ca    8.8      16 Jul 2020 15:43  Mg     2.2     07-16    TPro  7.2  /  Alb  3.7  /  TBili  0.5  /  DBili  x   /  AST  17  /  ALT  10  /  AlkPhos  56  07-16      CARDIAC MARKERS ( 16 Jul 2020 15:43 )  <0.015 ng/mL / x     / x     / x     / x                Radiology Results      Meds    MEDICATIONS  (STANDING):  atorvastatin 40 milliGRAM(s) Oral at bedtime  benztropine 0.5 milliGRAM(s) Oral at bedtime  carbidopa 48.75 mG/levodopa 195 mG ER 3 Capsule(s) Oral three times a day  chlorhexidine 2% Cloths 1 Application(s) Topical <User Schedule>  enoxaparin Injectable 70 milliGRAM(s) SubCutaneous every 12 hours  pantoprazole    Tablet 40 milliGRAM(s) Oral before breakfast  sodium chloride 0.9%. 1000 milliLiter(s) (100 mL/Hr) IV Continuous <Continuous>      MEDICATIONS  (PRN):  acetaminophen   Tablet .. 650 milliGRAM(s) Oral every 6 hours PRN Temp greater or equal to 38C (100.4F), Mild Pain (1 - 3), Moderate Pain (4 - 6)      Physical Exam    Neuro :  no focal deficits  Respiratory: CTA B/L  CV: RRR, S1S2, no murmurs,   Abdominal: Soft, NT, ND +BS,  Extremities: No edema, + peripheral pulses    ASSESSMENT    Anesthesia of skin  Parkinson disease  Other hyperlipidemia  Essential hypertension  No significant past surgical history      PLAN 69 y/o male with PMH of HTN, HLD and Parkinson disease come to ED s with numbness to the left arm that started yesterday around 8pm. Patient thought it would get better but it did not so he came to the ED. Pt denies any recent trauma to neck. Pt denies any upper or lower extremity weakness, slurring speech, confusion blurred vison, chest pain, palpitations, fever, cough or any other acute complaints.   In ED, /65, HR 53  CTA head/neck shows evidence of right distal cervical ICA dissection patient remains symptomatic at this time w/ left sided weakness and numbness  neurosurgery (Dr. Dey) advised against transfer of patient; advised medical management case discussed by MAR with Dr. Barrow -   patient admitted to ICU for strict BP monitoring      Review of Systems:  Other Review of Systems: All other review of systems negative, except as noted in HPI	      pt seen in icu [x  ], reg med floor [   ], bed [ x ], chair at bedside [   ], a+o x3 [ x ], lethargic [  ],  nad [ x ]            Allergies    No Known Allergies        Vitals    T(F): 98 (07-17-20 @ 05:00), Max: 98.6 (07-16-20 @ 17:01)  HR: 68 (07-17-20 @ 05:30) (53 - 88)  BP: 162/77 (07-17-20 @ 04:00) (148/72 - 179/83)  RR: 14 (07-17-20 @ 05:30) (12 - 22)  SpO2: 97% (07-17-20 @ 05:30) (93% - 100%)  Wt(kg): --  CAPILLARY BLOOD GLUCOSE      POCT Blood Glucose.: 111 mg/dL (16 Jul 2020 14:50)      Labs                          15.4   6.58  )-----------( 193      ( 16 Jul 2020 15:43 )             45.9       07-16    140  |  106  |  13  ----------------------------<  102<H>  3.9   |  29  |  0.95    Ca    8.8      16 Jul 2020 15:43  Mg     2.2     07-16    TPro  7.2  /  Alb  3.7  /  TBili  0.5  /  DBili  x   /  AST  17  /  ALT  10  /  AlkPhos  56  07-16      CARDIAC MARKERS ( 16 Jul 2020 15:43 )  <0.015 ng/mL / x     / x     / x     / x                Radiology Results      < from: CT Angio Head w/ IV Cont (07.16.20 @ 18:00) >  CT BRAIN WITHOUT CONTRAST:    There is no acute intracranial hemorrhage or mass effect. The ventricles and sulci are normal in size for patient's age.     There is no extraaxial fluid collection.     There is nodisplaced calvarial fracture. The visualized orbits are within normal limits. Polyp or retention cyst in the right sphenoid sinus. The mastoid air cells are well aerated.    CT ANGIOGRAPHY NECK:     Thoracic aorta and branch vessels: Patent.  Right carotid system: Linear filling defect in the distal right cervical ICA suspicious for a dissection (13:258, 18:59). No hemodynamically significant stenosis using NASCET criteria.   Left carotid system: Patent.  No hemodynamically significant stenosis using NASCET criteria.  No evidence of dissection.  Vertebral arteries: No focal stenosis or dissection.     Soft tissues of the neck: Unremarkable.  Visualized spine: Degenerative changes in the spine.  Visualized upper chest: Unremarkable.    CT ANGIOGRAPHY BRAIN:    Internal carotid arteries: Patent.   Anterior cerebral arteries: Patent.   Middle cerebral arteries: Patent.    Posterior cerebral arteries: Patent.   Vertebrobasilar: Patent.  Branch vasculature of the posterior circulation is within normal limits.     Vascular lesions: No evidence of intracranial aneurysm or large vascular malformation, within limits of CT technique.      IMPRESSION:     Brain CT without contrast:   No evidence of intracranial hemorrhage or mass effect.    CT angiography neck:   1.  Linear filling defect in the distal right cervical ICA suspicious for a dissection.  2.  No hemodynamically significant stenosis of the bilateral cervical ICAs using NASCET criteria.  Patent vertebral arteries.      CT angiography brain: No large vessel occlusion. No evidence of aneurysm.    < end of copied text >        Meds    MEDICATIONS  (STANDING):  atorvastatin 40 milliGRAM(s) Oral at bedtime  benztropine 0.5 milliGRAM(s) Oral at bedtime  carbidopa 48.75 mG/levodopa 195 mG ER 3 Capsule(s) Oral three times a day  chlorhexidine 2% Cloths 1 Application(s) Topical <User Schedule>  enoxaparin Injectable 70 milliGRAM(s) SubCutaneous every 12 hours  pantoprazole    Tablet 40 milliGRAM(s) Oral before breakfast  sodium chloride 0.9%. 1000 milliLiter(s) (100 mL/Hr) IV Continuous <Continuous>      MEDICATIONS  (PRN):  acetaminophen   Tablet .. 650 milliGRAM(s) Oral every 6 hours PRN Temp greater or equal to 38C (100.4F), Mild Pain (1 - 3), Moderate Pain (4 - 6)      Physical Exam    Neuro :  no focal deficits  Respiratory: CTA B/L  CV: RRR, S1S2, no murmurs,   Abdominal: Soft, NT, ND +BS,  Extremities: No edema, + peripheral pulses    ASSESSMENT    Anesthesia of skin   right cervical ica disection  h/o Parkinson disease  Other hyperlipidemia  Essential hypertension        PLAN    cont icu monitoring  neuro cons   vascular cons  a-line placed on 7/17  goal MAP between 100-140 for adequate cerebral perfusion   at present; if below 100, add IV fluids and potentially pressors if needed  permissive hypertension  holding home antihypertensives   full dose lovenox as advised by neuro  GI ppx with protonix  COVID-19 negative x1   cont current meds  cont mgmt as per icu

## 2020-07-17 NOTE — CONSULT NOTE ADULT - ATTENDING COMMENTS
Pt. presented with L arm numbness  CTA shows distal R ICA dissection  neurologic symptoms are stable  agree with full dose A/c  no indication for surgical interventions
MICU ATTENDING. I have personally seen and examined the pt. I was physically present for the key elements service provided. I agree with above history, physical and plan which I edited where appropriate. I total spent 35 min critical care time.   69 y/o with PMH of HTN and HLD presented with sudden onset l side weakness and numbness. CTA R ICA dissection. Admitted to ICU for close monitoring.    A/P CVA   R ICA dissection   HTN  MICU   BP monitor  Lovenox as per Neuro   neuro input appreciated   Vascular eval   Cont other meds  Case was presented to [facility] at [facility] . Pt was not accepted

## 2020-07-17 NOTE — CONSULT NOTE ADULT - ASSESSMENT
70 year old male presented with left sided numbness and found to have right carotid ICA dissection on CTA of the head and neck. Uncontrolled hypertension. PMH HLD and Parkinsons     - recommend bilateral carotid duplex  - agree with full dose anticoagulation   - continue with strict blood pressure control per neurology   - neurology follow up   - no vascular surgery intervention at this time  - continue ICU care and medical management  - will follow  - discussed with Dr. Pitts

## 2020-07-17 NOTE — CONSULT NOTE ADULT - REASON FOR ADMISSION
right cervical ICA dissection
Carotid dissection and numbness left side
right cervical ICA dissection

## 2020-07-17 NOTE — CONSULT NOTE ADULT - SUBJECTIVE AND OBJECTIVE BOX
HPI:  71 y/o male with PMH of HTN, HLD and Parkinson disease come to ED s with numbness to the left arm that started yesterday around 8pm. Patient thought it would get better but it did not so he came to the ED. Pt denies any recent trauma to neck. Pt denies any upper or lower extremity weakness, slurring speech, confusion blurred vison, chest pain, palpitations, fever, cough or any other acute complaints.   In ED, /65, HR 53 (16 Jul 2020 22:42)    Patient seen and examined at bedside in ICU for vascular consult due to findings of ICA dissection. 70 year old male with PMH HTN, HLD, and Parkinsons disease presented to ED complaining of left leg and right foot numbness for the past week. Patient denies weakness and history of similar events in the past. Denies slurred speech, vision changes, blurred vision, upper extremity weakness. Denies nausea and vomiting.     PAST MEDICAL & SURGICAL HISTORY:  Parkinson disease  Other hyperlipidemia  Essential hypertension  No significant past surgical history    Review of Systems: Contained within HPI    MEDICATIONS  (STANDING):  atorvastatin 40 milliGRAM(s) Oral at bedtime  benztropine 0.5 milliGRAM(s) Oral at bedtime  carbidopa 48.75 mG/levodopa 195 mG ER 3 Capsule(s) Oral three times a day  chlorhexidine 2% Cloths 1 Application(s) Topical <User Schedule>  enoxaparin Injectable 70 milliGRAM(s) SubCutaneous every 12 hours  pantoprazole    Tablet 40 milliGRAM(s) Oral before breakfast  sodium chloride 0.9%. 1000 milliLiter(s) (100 mL/Hr) IV Continuous <Continuous>    MEDICATIONS  (PRN):  acetaminophen   Tablet .. 650 milliGRAM(s) Oral every 6 hours PRN Temp greater or equal to 38C (100.4F), Mild Pain (1 - 3), Moderate Pain (4 - 6)    Allergies: No Known Allergies    SOCIAL HISTORY: Denies smoking and drinking history    Vital Signs Last 24 Hrs  T(C): 36.7 (17 Jul 2020 05:00), Max: 37 (16 Jul 2020 17:01)  T(F): 98 (17 Jul 2020 05:00), Max: 98.6 (16 Jul 2020 17:01)  HR: 62 (17 Jul 2020 09:00) (53 - 88)  BP: 156/73 (17 Jul 2020 08:00) (148/72 - 179/83)  BP(mean): 92 (17 Jul 2020 08:00) (90 - 108)  RR: 14 (17 Jul 2020 09:00) (11 - 22)  SpO2: 98% (17 Jul 2020 09:00) (93% - 100%)    Physical Exam:    General:  Appears stated age, well-groomed, well-nourished, no distress  Eyes: EOMI  HENT:  WNL, no JVD  Chest: respirations nonlabored  Cardiovascular:  Regular rate & rhythm  Abdomen: soft NT/ND    Extremities: no edema bilaterally  Vascular: 2+ DP pulses bilaterally, 2+radial pulse on left, right radial A line in place  Skin: warm and dry  Musculoskeletal: no calf tenderness  Neuro:  Alert, oriented to time, place and person   Psych: normal affect    LABS:                        15.5   6.53  )-----------( 193      ( 17 Jul 2020 06:53 )             46.7     07-17    138  |  105  |  14  ----------------------------<  129<H>  4.0   |  23  |  0.95    Ca    8.3<L>      17 Jul 2020 06:53  Phos  2.8     07-17  Mg     2.2     07-17    TPro  7.2  /  Alb  3.5  /  TBili  0.4  /  DBili  x   /  AST  19  /  ALT  22  /  AlkPhos  52  07-17    PT/INR - ( 17 Jul 2020 06:53 )   PT: 13.5 sec;   INR: 1.16 ratio      PTT - ( 16 Jul 2020 15:43 )  PTT:30.1 sec    RADIOLOGY & ADDITIONAL STUDIES:  < from: CT Angio Head w/ IV Cont (07.16.20 @ 18:00) >  EXAM:  CT ANGIO NECK (W)AW IC                          EXAM:  CT ANGIO BRAIN (W)AW IC                          PROCEDURE DATE:  07/16/2020      INTERPRETATION:  EXAMS:  1.  CT ANGIOGRAPHY NECK WITH INTRAVENOUS CONTRAST.  2.  CT ANGIOGRAPHY BRAIN WITH INTRAVENOUS CONTRAST.  3.  CT BRAIN WITHOUT CONTRAST    CLINICAL HISTORY: left sided numbness since last night. left sided numbness since last night. .     TECHNIQUE: Noncontrast CT head. Contrast enhanced axial CT images were acquired from the aortic arch to the vertex of the calvarium, during the angiographic phase.  Three-dimensional maximum intensity projection reformats were generated.  90 ml of Omnipaque-350 mg/ml were administered intravenously, without immediate complication.    COMPARISON STUDY: None.    FINDINGS:     CT BRAIN WITHOUT CONTRAST:    There is no acute intracranial hemorrhage or mass effect. The ventricles and sulci are normal in size for patient's age.     There is no extraaxial fluid collection.     There is nodisplaced calvarial fracture. The visualized orbits are within normal limits. Polyp or retention cyst in the right sphenoid sinus. The mastoid air cells are well aerated.    CT ANGIOGRAPHY NECK:     Thoracic aorta and branch vessels: Patent.  Right carotid system: Linear filling defect in the distal right cervical ICA suspicious for a dissection (13:258, 18:59). No hemodynamically significant stenosis using NASCET criteria.   Left carotid system: Patent.  No hemodynamically significant stenosis using NASCET criteria.  No evidence of dissection.  Vertebral arteries: No focal stenosis or dissection.     Soft tissues of the neck: Unremarkable.  Visualized spine: Degenerative changes in the spine.  Visualized upper chest: Unremarkable.    CT ANGIOGRAPHY BRAIN:    Internal carotid arteries: Patent.   Anterior cerebral arteries: Patent.   Middle cerebral arteries: Patent.    Posterior cerebral arteries: Patent.   Vertebrobasilar: Patent.  Branch vasculature of the posterior circulation is within normal limits.     Vascular lesions: No evidence of intracranial aneurysm or large vascular malformation, within limits of CT technique.    IMPRESSION:     Brain CT without contrast:   No evidence of intracranial hemorrhage or mass effect.    CT angiography neck:   1.  Linear filling defect in the distal right cervical ICA suspicious for a dissection.  2.  No hemodynamically significant stenosis of the bilateral cervical ICAs using NASCET criteria.  Patent vertebral arteries.      CT angiography brain: No large vessel occlusion. No evidence of aneurysm.    These findings were discussed with Dr. ARTEM LE [phone] at 7/16/2020 6:09 PM by Dr. Андрей Zapien with read back confirmation.    АНДРЕЙ ZAPIEN M.D., ATTENDING RADIOLOGIST  This document has been electronically signed. Jul 16 2020  6:12PM    < end of copied text >

## 2020-07-17 NOTE — CONSULT NOTE ADULT - ASSESSMENT
This is a 69 y/o male admitted to medicine for left sided numbness and weakness with CTA head/neck finding of right ICA dissection. Patient is accepted to ICU at this time for strict blood pressure monitoring with goal MAP between 100-140.    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  CTA head/neck shows evidence of right distal cervical ICA dissection  patient remains symptomatic at this time w/ left sided weakness  neurosurgery advised against transfer of patient; advised medical management  case discussed with neuro - will admit patient to ICU for strict BP monitoring  goal MAP between 100-140 for adequate cerebral perfusion  neuro checks q1 hr    CVS:  #right ICA dissection  management as above      Pulm:  no active issues  COVID-19 negative x1    GI:  no active issues  diet as tolerated    Renal:  no active issues  renal function wnl    Endo  no active issues    ID  no active issues    Prophylaxis  full dose lovenox   GI ppx with protonix This is a 69 y/o male admitted to medicine for left sided numbness and weakness with CTA head/neck finding of right ICA dissection. Patient is accepted to ICU at this time for strict blood pressure monitoring with goal MAP between 100-140.    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 Dey) advised against transfer of patient; advised medical management  case discussed with Dr. Barrow - will admit patient to ICU for strict BP monitoring  a-line placed on 7/17  goal MAP between 100-140 for adequate cerebral perfusion   at present; if below 100, add IV fluids and potentially pressors if needed  neuro checks q1 hr    #Parkinson's disease  c/w Sinemet  c/w cogentin    CVS:  #right ICA dissection  management as above  permissive hypertension  holding home antihypertensives    Pulm:  no active issues  COVID-19 negative x1    GI:  no active issues  diet as tolerated    Renal:  no active issues  renal function wnl    Endo:  no active issues  check HbA1C and lipid panel    ID:  no active issues    Prophylaxis:  full dose lovenox as advised by neuro  GI ppx with protonix    DISPO:  Patient is accepted to ICU for close hemodynamic monitoring and frequent neuro checks. This is a 71 y/o male admitted to medicine for left sided numbness and weakness with CTA head/neck finding of right ICA dissection. Patient is accepted to ICU at this time for strict blood pressure monitoring with goal MAP between 100-140.    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 Dey) advised against transfer of patient; advised medical management  case discussed by MAR with Dr. Barrow - will admit patient to ICU for strict BP monitoring  a-line placed on 7/17  goal MAP between 100-140 for adequate cerebral perfusion   at present; if below 100, add IV fluids and potentially pressors if needed  neuro checks q1 hr    #Parkinson's disease  c/w Sinemet  c/w cogentin    CVS:  #right ICA dissection  management as above  permissive hypertension  holding home antihypertensives  check lipid panel    Pulm:  no active issues  COVID-19 negative x1    GI:  no active issues  diet as tolerated    Renal:  no active issues  renal function wnl    Endo:  no active issues  check HbA1C and TSH    ID:  no active issues    Prophylaxis:  full dose lovenox as advised by neuro  GI ppx with protonix    DISPO:  Patient is accepted to ICU for close hemodynamic monitoring and frequent neuro checks. This is a 71 y/o male admitted to medicine for left sided numbness and weakness with CTA head/neck finding of right ICA dissection. Patient is accepted to ICU at this time for strict blood pressure monitoring with goal MAP between 100-140.    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 Dey) advised against transfer of patient; advised medical management  case discussed by MAR with Dr. Barrow - will admit patient to ICU for strict BP monitoring  a-line placed on 7/17  goal MAP between 100-140 for adequate cerebral perfusion   at present; if below 100, add IV fluids and potentially pressors if needed  neuro checks q1 hr    #Parkinson's disease  c/w Rytary  c/w cogentin    CVS:  #right ICA dissection  management as above  permissive hypertension  holding home antihypertensives  check lipid panel    Pulm:  no active issues  COVID-19 negative x1    GI:  no active issues  diet as tolerated    Renal:  no active issues  renal function wnl    Endo:  no active issues  check HbA1C and TSH    ID:  no active issues    Prophylaxis:  full dose lovenox as advised by neuro  GI ppx with protonix    DISPO:  Patient is accepted to ICU for close hemodynamic monitoring and frequent neuro checks.

## 2020-07-17 NOTE — PROCEDURE NOTE - NSPROCDETAILS_GEN_ALL_CORE
ultrasound guidance/location identified, draped/prepped, sterile technique used, needle inserted/introduced/connected to a pressurized flush line/sutured in place

## 2020-07-17 NOTE — CONSULT NOTE ADULT - SUBJECTIVE AND OBJECTIVE BOX
Patient is a 70y old  Male who presents with a chief complaint of right cervical ICA dissection (17 Jul 2020 09:37)      HPI:   71 y/o male with PMH of HTN, HLD and Parkinson disease come to ED s with numbness to the left arm that started 7/15 around 8pm. Hoping it would get better he waited until the next day and when it did not improve went to the ED. He denies any recent trauma to neck. He denies any upper or lower extremity weakness, slurring speech, confusion blurred vison, chest pain, palpitations, fever, cough or any other acute complaints.   In ED, /65, HR 53  PAST MEDICAL & SURGICAL HISTORY:  Parkinson disease  Other hyperlipidemia  Essential hypertension  No significant past surgical history      FAMILY HISTORY:        Social Hx:  Nonsmoker, no drug or alcohol use    MEDICATIONS  (STANDING):  atorvastatin 40 milliGRAM(s) Oral at bedtime  benztropine 0.5 milliGRAM(s) Oral at bedtime  carbidopa 48.75 mG/levodopa 195 mG ER 3 Capsule(s) Oral <User Schedule>  chlorhexidine 2% Cloths 1 Application(s) Topical <User Schedule>  enoxaparin Injectable 70 milliGRAM(s) SubCutaneous every 12 hours  pantoprazole    Tablet 40 milliGRAM(s) Oral before breakfast  sodium chloride 0.9%. 1000 milliLiter(s) (100 mL/Hr) IV Continuous <Continuous>       Allergies    No Known Allergies    Intolerances        ROS: Pertinent positives in HPI, all other ROS were reviewed and are negative.      Vital Signs Last 24 Hrs  T(C): 36.6 (17 Jul 2020 09:00), Max: 37 (16 Jul 2020 17:01)  T(F): 97.8 (17 Jul 2020 09:00), Max: 98.6 (16 Jul 2020 17:01)  HR: 68 (17 Jul 2020 12:00) (51 - 88)  BP: 156/73 (17 Jul 2020 08:00) (148/72 - 179/83)  BP(mean): 92 (17 Jul 2020 08:00) (90 - 108)  RR: 16 (17 Jul 2020 12:00) (10 - 22)  SpO2: 96% (17 Jul 2020 12:00) (93% - 100%)        Constitutional: awake and alert.  HEENT: PERRLA, EOMI,   Neck: Supple.  Respiratory: Breath sounds are clear bilaterally  Cardiovascular: S1 and S2, regular / irregular rhythm  Gastrointestinal: soft, nontender  Extremities:  no edema  Vascular: Carotid Bruit - no  Musculoskeletal: no joint swelling/tenderness, no abnormal movements  Skin: No rashes    Neurological exam:  HF: A x O x 3. Appropriately interactive, normal affect. Speech fluent, No Aphasia or paraphasic errors. Naming /repetition intact   CN: MORGAN, EOMI, VFF, facial sensation diminished left, left NLFD, tongue midline, Palate moves equally, SCM equal bilaterally  Motor: No pronator drift, Strength 5/5 in all 4 ext, normal bulk and tone, no tremor, rigidity or bradykinesia.    Sens: Intact to light touch / PP/ VS/ JS  but diminished on the left side  Reflexes: Symmetric and normal . BJ 2+, BR 2+, KJ 2+, AJ 2+, downgoing toes b/l  Coord:  No FNFA, dysmetria, NEYMAR intact   Gait/Balance: Cannot test    NIHSS: 3  1A: Level of consciousness       0= Alert; keenly responsive  1B: Ask month and age       0= Both questions right  1C: "Blink eyes" and "Squeeze Hands"       0= Performs both  2: Horizontal EOMs       0= Normal  3: Visual fields       0= No visual loss  4: Facial palsy (use grimace if obtunded)       +1= Minor paralysis (flat NLF, smile asymmetry)  5A: Left arm motor drift (count out loud and use fingers to show count)       0= No drift x 10 seconds  5B: Right arm motor drift       0= No drift x 10 seconds  6A: Left leg motor drift       0= No drift x 10 seconds  6B: Right leg motor drift       0= No drift x 10 seconds  7: Limb ataxia (FNF/heel-shin)       0= No ataxia  8: Sensation       +1= mild-moderate loss: less sharp/more dull  9: Language/aphasia- describe the scene (on danita); name the items; read the sentences (on danita)       0= Normal, no aphasia  10: Dysarthria- read the words       0= Normal  11: Extinction/inattention       +1= Extinction to b/l simultaneous stimulation        Labs:                        15.5   6.53  )-----------( 193      ( 17 Jul 2020 06:53 )             46.7     07-17    138  |  105  |  14  ----------------------------<  129<H>  4.0   |  23  |  0.95    Ca    8.3<L>      17 Jul 2020 06:53  Phos  2.8     07-17  Mg     2.2     07-17    TPro  7.2  /  Alb  3.5  /  TBili  0.4  /  DBili  x   /  AST  19  /  ALT  22  /  AlkPhos  52  07-17 07-17 Chol 136 LDL 78 HDL 45 Trig 64  PT/INR - ( 17 Jul 2020 06:53 )   PT: 13.5 sec;   INR: 1.16 ratio         PTT - ( 16 Jul 2020 15:43 )  PTT:30.1 sec    Radiology report:  - CT head:  < from: CT Angio Neck w/ IV Cont (07.16.20 @ 18:00) >  EXAM:  CT ANGIO NECK (W)AW IC                          EXAM:  CT ANGIO BRAIN (W)AW IC                            PROCEDURE DATE:  07/16/2020          INTERPRETATION:  EXAMS:  1.  CT ANGIOGRAPHY NECK WITH INTRAVENOUS CONTRAST.  2.  CT ANGIOGRAPHY BRAIN WITH INTRAVENOUS CONTRAST.  3.  CT BRAIN WITHOUT CONTRAST    CLINICAL HISTORY: left sided numbness since last night. left sided numbness since last night. .     TECHNIQUE: Noncontrast CT head. Contrast enhanced axial CT images were acquired from the aortic arch to the vertex of the calvarium, during the angiographic phase.  Three-dimensional maximum intensity projection reformats were generated.  90 ml of Omnipaque-350 mg/ml were administered intravenously, without immediate complication.    COMPARISON STUDY: None.    FINDINGS:     CT BRAIN WITHOUT CONTRAST:    There is no acute intracranial hemorrhage or mass effect. The ventricles and sulci are normal in size for patient's age.     There is no extraaxial fluid collection.     There is nodisplaced calvarial fracture. The visualized orbits are within normal limits. Polyp or retention cyst in the right sphenoid sinus. The mastoid air cells are well aerated.    CT ANGIOGRAPHY NECK:     Thoracic aorta and branch vessels: Patent.  Right carotid system: Linear filling defect in the distal right cervical ICA suspicious for a dissection (13:258, 18:59). No hemodynamically significant stenosis using NASCET criteria.   Left carotid system: Patent.  No hemodynamically significant stenosis using NASCET criteria.  No evidence of dissection.  Vertebral arteries: No focal stenosis or dissection.     Soft tissues of the neck: Unremarkable.  Visualized spine: Degenerative changes in the spine.  Visualized upper chest: Unremarkable.    CT ANGIOGRAPHY BRAIN:    Internal carotid arteries: Patent.   Anterior cerebral arteries: Patent.   Middle cerebral arteries: Patent.    Posterior cerebral arteries: Patent.   Vertebrobasilar: Patent.  Branch vasculature of the posterior circulation is within normal limits.     Vascular lesions: No evidence of intracranial aneurysm or large vascular malformation, within limits of CT technique.      IMPRESSION:     Brain CT without contrast:   No evidence of intracranial hemorrhage or mass effect.    CT angiography neck:   1.  Linear filling defect in the distal right cervical ICA suspicious for a dissection.  2.  No hemodynamically significant stenosis of the bilateral cervical ICAs using NASCET criteria.  Patent vertebral arteries.      CT angiography brain: No large vessel occlusion. No evidence of aneurysm.    These findings were discussed with Dr. ARTEM LE [id number] at 7/16/2020 6:09 PM by Dr. Ann-Marie Zapien with read back confirmation.      ANN-MARIE ZAPIEN M.D., ATTENDING RADIOLOGIST  This document has been electronically signed. Jul 16 2020  6:12PM        < end of copied text >

## 2020-07-17 NOTE — CONSULT NOTE ADULT - SUBJECTIVE AND OBJECTIVE BOX
Patient is a 70y old  Male who presents with a chief complaint of left sided numbness/weakness.    HPI:  71 y/o male with PMH of HTN, HLD and Parkinson disease come to ED s with numbness to the left arm that started yesterday around 8pm. Patient thought it would get better but it did not so he came to the ED. Pt denies any recent trauma to neck. Pt denies any upper or lower extremity weakness, slurring speech, confusion blurred vison, chest pain, palpitations, fever, cough or any other acute complaints.   In ED, /65, HR 53 (16 Jul 2020 22:42)    ICU consulted given CTA head/neck findings of possible right cervical ICA dissection. ED provider discussed case with neurosurgery, who advised against transfer and opted for medical management. Case discussed by MAR with neurologist Dr. Barrow, who recommends patient be monitored in ICU at this time for strict blood pressure monitoring, with goal MAP between 100-140mmHg. Patient seen and examined by myself in ED. Noted to be mildly irritable at this time and complains of generalized body pain as well as left sided weakness and headache. Denies neck pain or dizziness.      Allergies    No Known Allergies    Intolerances        MEDICATIONS  (STANDING):  atorvastatin 40 milliGRAM(s) Oral at bedtime  benztropine 0.5 milliGRAM(s) Oral at bedtime  carbidopa/levodopa  25/100 3 Tablet(s) Oral three times a day  chlorhexidine 2% Cloths 1 Application(s) Topical <User Schedule>    MEDICATIONS  (PRN):      Daily Height in cm: 172.72 (16 Jul 2020 14:26)    Daily     Drug Dosing Weight  Height (cm): 172.72 (16 Jul 2020 14:26)  Weight (kg): 70 (16 Jul 2020 14:26)  BMI (kg/m2): 23.5 (16 Jul 2020 14:26)  BSA (m2): 1.83 (16 Jul 2020 14:26)    PAST MEDICAL & SURGICAL HISTORY:  Parkinson disease  Other hyperlipidemia  Essential hypertension  No significant past surgical history      FAMILY HISTORY:      SOCIAL HISTORY: Denies use of EtOH, tobacco or illicit drugs. Lives at home with family.    ADVANCE DIRECTIVES: Full code.    REVIEW OF SYSTEMS:    CONSTITUTIONAL: No fever, weight loss, or fatigue  EYES: No eye pain, visual disturbances, or discharge  ENMT:  No difficulty hearing, tinnitus, vertigo; No sinus or throat pain  NECK: No pain or stiffness  RESPIRATORY: No cough, wheezing, chills or hemoptysis; No shortness of breath  CARDIOVASCULAR: No chest pain, palpitations, dizziness, or leg swelling  GASTROINTESTINAL: No abdominal or epigastric pain. No nausea, vomiting, or hematemesis; No diarrhea or constipation. No melena or hematochezia.  NEUROLOGICAL: +headache, LUE and LLE numbness/weakness; RLE weakness, less than left  SKIN: No itching, burning, rashes, or lesions   MUSCULOSKELETAL: No joint pain or swelling; No muscle, back, or extremity pain      ICU Vital Signs Last 24 Hrs  T(C): 37 (16 Jul 2020 17:01), Max: 37 (16 Jul 2020 17:01)  T(F): 98.6 (16 Jul 2020 17:01), Max: 98.6 (16 Jul 2020 17:01)  HR: 53 (16 Jul 2020 17:01) (53 - 61)  BP: 162/65 (16 Jul 2020 17:01) (162/65 - 174/91)  BP(mean): --  ABP: --  ABP(mean): --  RR: 16 (16 Jul 2020 17:01) (16 - 20)  SpO2: 96% (16 Jul 2020 17:01) (96% - 99%)        I&O's Detail      PHYSICAL EXAM:    GENERAL: NAD; comfortable appearing  HEAD:  Atraumatic, Normocephalic  EYES: EOMI, PERRLA, conjunctiva and sclera clear  ENMT: No tonsillar erythema, exudates, or enlargement; Moist mucous membranes, Good dentition, No lesions  NECK: Supple, No JVD, Normal thyroid  NERVOUS SYSTEM:  Alert & Oriented X3, Good concentration; motor strength 2/5 in bilateral LE; motor strength 5/5 in RUE and 3/5 in LUE; sensation intact diffusely, but patient endorses mildly decreased sensation of bilateral LE  CHEST/LUNG: Clear to percussion bilaterally; No rales, rhonchi, wheezing, or rubs  HEART: Regular rate and rhythm; No murmurs, rubs, or gallops  ABDOMEN: Soft, Nontender, Nondistended; Bowel sounds present  EXTREMITIES:  2+ Peripheral Pulses, No clubbing, cyanosis, or edema  SKIN: No rashes or lesions    LABS:  CBC Full  -  ( 16 Jul 2020 15:43 )  WBC Count : 6.58 K/uL  RBC Count : 5.28 M/uL  Hemoglobin : 15.4 g/dL  Hematocrit : 45.9 %  Platelet Count - Automated : 193 K/uL  Mean Cell Volume : 86.9 fl  Mean Cell Hemoglobin : 29.2 pg  Mean Cell Hemoglobin Concentration : 33.6 gm/dL  Auto Neutrophil # : 4.56 K/uL  Auto Lymphocyte # : 1.40 K/uL  Auto Monocyte # : 0.44 K/uL  Auto Eosinophil # : 0.12 K/uL  Auto Basophil # : 0.05 K/uL  Auto Neutrophil % : 69.2 %  Auto Lymphocyte % : 21.3 %  Auto Monocyte % : 6.7 %  Auto Eosinophil % : 1.8 %  Auto Basophil % : 0.8 %    07-16    140  |  106  |  13  ----------------------------<  102<H>  3.9   |  29  |  0.95    Ca    8.8      16 Jul 2020 15:43  Mg     2.2     07-16    TPro  7.2  /  Alb  3.7  /  TBili  0.5  /  DBili  x   /  AST  17  /  ALT  10  /  AlkPhos  56  07-16    CAPILLARY BLOOD GLUCOSE      POCT Blood Glucose.: 111 mg/dL (16 Jul 2020 14:50)    PT/INR - ( 16 Jul 2020 15:43 )   PT: 13.1 sec;   INR: 1.13 ratio         PTT - ( 16 Jul 2020 15:43 )  PTT:30.1 sec    CARDIAC MARKERS ( 16 Jul 2020 15:43 )  <0.015 ng/mL / x     / x     / x     / x              EKG: sinus    RADIOLOGY:    CTA head/neck  IMPRESSION:     Brain CT without contrast:   No evidence of intracranial hemorrhage or mass effect.    CT angiography neck:   1.  Linear filling defect in the distal right cervical ICA suspicious for a dissection.  2.  No hemodynamically significant stenosis of the bilateral cervical ICAs using NASCET criteria.  Patent vertebral arteries.      CT angiography brain: No large vessel occlusion. No evidence of aneurysm.    These findings were discussed with Dr. ARTEM LE [id number] at 7/16/2020 6:09 PM by Dr. Андрей Pace with read back confirmation.    CRITICAL CARE TIME SPENT: 35 MINUTES Patient is a 70y old  Male who presents with a chief complaint of left sided numbness/weakness.    HPI:  69 y/o male with PMH of HTN, HLD and Parkinson disease come to ED s with numbness to the left arm that started yesterday around 8pm. Patient thought it would get better but it did not so he came to the ED. Pt denies any recent trauma to neck. Pt denies any upper or lower extremity weakness, slurring speech, confusion blurred vison, chest pain, palpitations, fever, cough or any other acute complaints.   In ED, /65, HR 53 (16 Jul 2020 22:42)    ICU consulted given CTA head/neck findings of possible right cervical ICA dissection. ED provider discussed case with neurosurgery, who advised against transfer and opted for medical management. Case discussed by MAR with neurologist Dr. Barrow, who recommends patient be monitored in ICU at this time for strict blood pressure monitoring, with goal MAP between 100-140mmHg. Patient seen and examined by myself in ED. Noted to be mildly irritable at this time and complains of generalized body pain as well as left sided weakness and headache. Denies neck pain or dizziness. Denies slurring of speech.      Allergies    No Known Allergies    Intolerances        MEDICATIONS  (STANDING):  atorvastatin 40 milliGRAM(s) Oral at bedtime  benztropine 0.5 milliGRAM(s) Oral at bedtime  carbidopa/levodopa  25/100 3 Tablet(s) Oral three times a day  chlorhexidine 2% Cloths 1 Application(s) Topical <User Schedule>    MEDICATIONS  (PRN):      Daily Height in cm: 172.72 (16 Jul 2020 14:26)    Daily     Drug Dosing Weight  Height (cm): 172.72 (16 Jul 2020 14:26)  Weight (kg): 70 (16 Jul 2020 14:26)  BMI (kg/m2): 23.5 (16 Jul 2020 14:26)  BSA (m2): 1.83 (16 Jul 2020 14:26)    PAST MEDICAL & SURGICAL HISTORY:  Parkinson disease  Other hyperlipidemia  Essential hypertension  No significant past surgical history      FAMILY HISTORY: Noncontributory      SOCIAL HISTORY: Denies use of EtOH, tobacco or illicit drugs. Lives at home with family.    ADVANCE DIRECTIVES: Full code.    REVIEW OF SYSTEMS:    CONSTITUTIONAL: No fever, weight loss, or fatigue  EYES: No eye pain, visual disturbances, or discharge  ENMT:  No difficulty hearing, tinnitus, vertigo; No sinus or throat pain  NECK: No pain or stiffness  RESPIRATORY: No cough, wheezing, chills or hemoptysis; No shortness of breath  CARDIOVASCULAR: No chest pain, palpitations, dizziness, or leg swelling  GASTROINTESTINAL: No abdominal or epigastric pain. No nausea, vomiting, or hematemesis; No diarrhea or constipation. No melena or hematochezia.  NEUROLOGICAL: +headache, LUE and LLE numbness/weakness; RLE weakness, less than left  SKIN: No itching, burning, rashes, or lesions   MUSCULOSKELETAL: No joint pain or swelling; No muscle, back, or extremity pain      ICU Vital Signs Last 24 Hrs  T(C): 37 (16 Jul 2020 17:01), Max: 37 (16 Jul 2020 17:01)  T(F): 98.6 (16 Jul 2020 17:01), Max: 98.6 (16 Jul 2020 17:01)  HR: 53 (16 Jul 2020 17:01) (53 - 61)  BP: 162/65 (16 Jul 2020 17:01) (162/65 - 174/91)  BP(mean): --  ABP: --  ABP(mean): --  RR: 16 (16 Jul 2020 17:01) (16 - 20)  SpO2: 96% (16 Jul 2020 17:01) (96% - 99%)        I&O's Detail      PHYSICAL EXAM:    GENERAL: NAD; comfortable appearing  HEAD:  Atraumatic, Normocephalic  EYES: EOMI, PERRLA, conjunctiva and sclera clear  ENMT: No tonsillar erythema, exudates, or enlargement; Moist mucous membranes, Good dentition, No lesions  NECK: Supple, No JVD, Normal thyroid  NERVOUS SYSTEM:  Alert & Oriented X3, Good concentration; motor strength 2/5 in bilateral LE; motor strength 5/5 in RUE and 3/5 in LUE; sensation intact diffusely, but patient endorses mildly decreased sensation of bilateral LE  CHEST/LUNG: Clear to percussion bilaterally; No rales, rhonchi, wheezing, or rubs  HEART: Regular rate and rhythm; No murmurs, rubs, or gallops  ABDOMEN: Soft, Nontender, Nondistended; Bowel sounds present  EXTREMITIES:  2+ Peripheral Pulses, No clubbing, cyanosis, or edema  SKIN: No rashes or lesions    LABS:  CBC Full  -  ( 16 Jul 2020 15:43 )  WBC Count : 6.58 K/uL  RBC Count : 5.28 M/uL  Hemoglobin : 15.4 g/dL  Hematocrit : 45.9 %  Platelet Count - Automated : 193 K/uL  Mean Cell Volume : 86.9 fl  Mean Cell Hemoglobin : 29.2 pg  Mean Cell Hemoglobin Concentration : 33.6 gm/dL  Auto Neutrophil # : 4.56 K/uL  Auto Lymphocyte # : 1.40 K/uL  Auto Monocyte # : 0.44 K/uL  Auto Eosinophil # : 0.12 K/uL  Auto Basophil # : 0.05 K/uL  Auto Neutrophil % : 69.2 %  Auto Lymphocyte % : 21.3 %  Auto Monocyte % : 6.7 %  Auto Eosinophil % : 1.8 %  Auto Basophil % : 0.8 %    07-16    140  |  106  |  13  ----------------------------<  102<H>  3.9   |  29  |  0.95    Ca    8.8      16 Jul 2020 15:43  Mg     2.2     07-16    TPro  7.2  /  Alb  3.7  /  TBili  0.5  /  DBili  x   /  AST  17  /  ALT  10  /  AlkPhos  56  07-16    CAPILLARY BLOOD GLUCOSE      POCT Blood Glucose.: 111 mg/dL (16 Jul 2020 14:50)    PT/INR - ( 16 Jul 2020 15:43 )   PT: 13.1 sec;   INR: 1.13 ratio         PTT - ( 16 Jul 2020 15:43 )  PTT:30.1 sec    CARDIAC MARKERS ( 16 Jul 2020 15:43 )  <0.015 ng/mL / x     / x     / x     / x              EKG: sinus    RADIOLOGY:    CTA head/neck  IMPRESSION:     Brain CT without contrast:   No evidence of intracranial hemorrhage or mass effect.    CT angiography neck:   1.  Linear filling defect in the distal right cervical ICA suspicious for a dissection.  2.  No hemodynamically significant stenosis of the bilateral cervical ICAs using NASCET criteria.  Patent vertebral arteries.      CT angiography brain: No large vessel occlusion. No evidence of aneurysm.    These findings were discussed with Dr. ARTEM LE [id number] at 7/16/2020 6:09 PM by Dr. Андрей Pace with read back confirmation.    CRITICAL CARE TIME SPENT: 35 MINUTES

## 2020-07-17 NOTE — PATIENT PROFILE ADULT - VISION (WITH CORRECTIVE LENSES IF THE PATIENT USUALLY WEARS THEM):
Partially impaired: cannot see medication labels or newsprint, but can see obstacles in path, and the surrounding layout; can count fingers at arm's length
cystoscopy with biopsy

## 2020-07-17 NOTE — PROGRESS NOTE ADULT - ASSESSMENT
This is a 69 y/o male admitted to medicine for left sided numbness and weakness with CTA head/neck finding of right ICA dissection. Patient is accepted to ICU at this time for strict blood pressure monitoring with goal MAP between 100-140.    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 Dey) advised against transfer of patient; advised medical management  case discussed by MAR with Dr. Barrow - will admit patient to ICU for strict BP monitoring  goal MAP between 100-140 for adequate cerebral perfusion; started on pheny   neuro checks q1 hr    #Parkinson's disease  c/w Rytary  c/w cogentin    CVS:  #right ICA dissection  management as above  permissive hypertension  holding home antihypertensives  check lipid panel    Pulm:  no active issues  COVID-19 negative x1    GI:  no active issues  diet as tolerated    Renal:  no active issues  renal function wnl    Endo:  no active issues  check HbA1C and TSH    ID:  no active issues    Prophylaxis:  full dose lovenox as advised by neuro  GI ppx with protonix    DISPO:  Patient is accepted to ICU for close hemodynamic monitoring and frequent neuro checks. This is a 71 y/o male admitted to medicine for left sided numbness and weakness with CTA head/neck finding of right ICA dissection. Patient is accepted to ICU at this time for strict blood pressure monitoring with goal MAP between 100-140.    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 Dey) advised against transfer of patient; advised medical management  case discussed by MAR with Dr. Barrow - will admit patient to ICU for strict BP monitoring  goal MAP between 100-140 for adequate cerebral perfusion; started on pheny for maintanence   vascular consulted Dr. Shannon recommended carotid dopplers  neuro checks q1 hr    #Parkinson's disease  c/w Rytary  c/w cogentin    CVS:  #right ICA dissection  management as above  permissive hypertension  holding home antihypertensives  lipid panel shows cholesterol 136     Pulm:  no active issues  COVID-19 negative x1    GI:  no active issues  diet as tolerated    Renal:  no active issues  renal function wnl    Endo:  no active issues  5.8 HbA1C and TSH 1.87    ID:  no active issues    Prophylaxis:  full dose lovenox as advised by neuro  GI ppx with protonix    DISPO:  Patient is accepted to ICU for close hemodynamic monitoring and frequent neuro checks.

## 2020-07-18 LAB
ALBUMIN SERPL ELPH-MCNC: 3.5 G/DL — SIGNIFICANT CHANGE UP (ref 3.5–5)
ALP SERPL-CCNC: 53 U/L — SIGNIFICANT CHANGE UP (ref 40–120)
ALT FLD-CCNC: 8 U/L DA — LOW (ref 10–60)
ANION GAP SERPL CALC-SCNC: 8 MMOL/L — SIGNIFICANT CHANGE UP (ref 5–17)
AST SERPL-CCNC: 15 U/L — SIGNIFICANT CHANGE UP (ref 10–40)
BASOPHILS # BLD AUTO: 0.05 K/UL — SIGNIFICANT CHANGE UP (ref 0–0.2)
BASOPHILS NFR BLD AUTO: 0.8 % — SIGNIFICANT CHANGE UP (ref 0–2)
BILIRUB SERPL-MCNC: 0.8 MG/DL — SIGNIFICANT CHANGE UP (ref 0.2–1.2)
BUN SERPL-MCNC: 11 MG/DL — SIGNIFICANT CHANGE UP (ref 7–18)
CALCIUM SERPL-MCNC: 8.8 MG/DL — SIGNIFICANT CHANGE UP (ref 8.4–10.5)
CHLORIDE SERPL-SCNC: 107 MMOL/L — SIGNIFICANT CHANGE UP (ref 96–108)
CO2 SERPL-SCNC: 25 MMOL/L — SIGNIFICANT CHANGE UP (ref 22–31)
CREAT SERPL-MCNC: 0.82 MG/DL — SIGNIFICANT CHANGE UP (ref 0.5–1.3)
EOSINOPHIL # BLD AUTO: 0.18 K/UL — SIGNIFICANT CHANGE UP (ref 0–0.5)
EOSINOPHIL NFR BLD AUTO: 2.9 % — SIGNIFICANT CHANGE UP (ref 0–6)
GLUCOSE SERPL-MCNC: 107 MG/DL — HIGH (ref 70–99)
HCT VFR BLD CALC: 47.4 % — SIGNIFICANT CHANGE UP (ref 39–50)
HGB BLD-MCNC: 15.9 G/DL — SIGNIFICANT CHANGE UP (ref 13–17)
IMM GRANULOCYTES NFR BLD AUTO: 0.2 % — SIGNIFICANT CHANGE UP (ref 0–1.5)
LYMPHOCYTES # BLD AUTO: 1.4 K/UL — SIGNIFICANT CHANGE UP (ref 1–3.3)
LYMPHOCYTES # BLD AUTO: 22.8 % — SIGNIFICANT CHANGE UP (ref 13–44)
MAGNESIUM SERPL-MCNC: 2.3 MG/DL — SIGNIFICANT CHANGE UP (ref 1.6–2.6)
MCHC RBC-ENTMCNC: 28.8 PG — SIGNIFICANT CHANGE UP (ref 27–34)
MCHC RBC-ENTMCNC: 33.5 GM/DL — SIGNIFICANT CHANGE UP (ref 32–36)
MCV RBC AUTO: 85.9 FL — SIGNIFICANT CHANGE UP (ref 80–100)
MONOCYTES # BLD AUTO: 0.44 K/UL — SIGNIFICANT CHANGE UP (ref 0–0.9)
MONOCYTES NFR BLD AUTO: 7.2 % — SIGNIFICANT CHANGE UP (ref 2–14)
NEUTROPHILS # BLD AUTO: 4.07 K/UL — SIGNIFICANT CHANGE UP (ref 1.8–7.4)
NEUTROPHILS NFR BLD AUTO: 66.1 % — SIGNIFICANT CHANGE UP (ref 43–77)
NRBC # BLD: 0 /100 WBCS — SIGNIFICANT CHANGE UP (ref 0–0)
PHOSPHATE SERPL-MCNC: 3.6 MG/DL — SIGNIFICANT CHANGE UP (ref 2.5–4.5)
PLATELET # BLD AUTO: 169 K/UL — SIGNIFICANT CHANGE UP (ref 150–400)
POTASSIUM SERPL-MCNC: 3.5 MMOL/L — SIGNIFICANT CHANGE UP (ref 3.5–5.3)
POTASSIUM SERPL-SCNC: 3.5 MMOL/L — SIGNIFICANT CHANGE UP (ref 3.5–5.3)
PROT SERPL-MCNC: 7.1 G/DL — SIGNIFICANT CHANGE UP (ref 6–8.3)
RBC # BLD: 5.52 M/UL — SIGNIFICANT CHANGE UP (ref 4.2–5.8)
RBC # FLD: 12.4 % — SIGNIFICANT CHANGE UP (ref 10.3–14.5)
SODIUM SERPL-SCNC: 140 MMOL/L — SIGNIFICANT CHANGE UP (ref 135–145)
WBC # BLD: 6.15 K/UL — SIGNIFICANT CHANGE UP (ref 3.8–10.5)
WBC # FLD AUTO: 6.15 K/UL — SIGNIFICANT CHANGE UP (ref 3.8–10.5)

## 2020-07-18 PROCEDURE — 99291 CRITICAL CARE FIRST HOUR: CPT

## 2020-07-18 RX ORDER — ENOXAPARIN SODIUM 100 MG/ML
70 INJECTION SUBCUTANEOUS
Refills: 0 | Status: DISCONTINUED | OUTPATIENT
Start: 2020-07-18 | End: 2020-07-22

## 2020-07-18 RX ORDER — SODIUM CHLORIDE 9 MG/ML
1000 INJECTION INTRAMUSCULAR; INTRAVENOUS; SUBCUTANEOUS
Refills: 0 | Status: DISCONTINUED | OUTPATIENT
Start: 2020-07-18 | End: 2020-07-18

## 2020-07-18 RX ADMIN — Medication 650 MILLIGRAM(S): at 10:52

## 2020-07-18 RX ADMIN — CHLORHEXIDINE GLUCONATE 1 APPLICATION(S): 213 SOLUTION TOPICAL at 05:32

## 2020-07-18 RX ADMIN — Medication 650 MILLIGRAM(S): at 21:21

## 2020-07-18 RX ADMIN — ATORVASTATIN CALCIUM 40 MILLIGRAM(S): 80 TABLET, FILM COATED ORAL at 21:20

## 2020-07-18 RX ADMIN — CARBIDOPA AND LEVODOPA 3 CAPSULE(S): 25; 100 TABLET ORAL at 12:28

## 2020-07-18 RX ADMIN — Medication 1 APPLICATION(S): at 16:03

## 2020-07-18 RX ADMIN — Medication 650 MILLIGRAM(S): at 03:52

## 2020-07-18 RX ADMIN — Medication 650 MILLIGRAM(S): at 21:30

## 2020-07-18 RX ADMIN — CARBIDOPA AND LEVODOPA 3 CAPSULE(S): 25; 100 TABLET ORAL at 17:02

## 2020-07-18 RX ADMIN — Medication 650 MILLIGRAM(S): at 11:24

## 2020-07-18 RX ADMIN — PANTOPRAZOLE SODIUM 40 MILLIGRAM(S): 20 TABLET, DELAYED RELEASE ORAL at 05:32

## 2020-07-18 RX ADMIN — SODIUM CHLORIDE 100 MILLILITER(S): 9 INJECTION INTRAMUSCULAR; INTRAVENOUS; SUBCUTANEOUS at 13:34

## 2020-07-18 RX ADMIN — CARBIDOPA AND LEVODOPA 3 CAPSULE(S): 25; 100 TABLET ORAL at 05:32

## 2020-07-18 RX ADMIN — ENOXAPARIN SODIUM 70 MILLIGRAM(S): 100 INJECTION SUBCUTANEOUS at 15:58

## 2020-07-18 RX ADMIN — ENOXAPARIN SODIUM 70 MILLIGRAM(S): 100 INJECTION SUBCUTANEOUS at 03:14

## 2020-07-18 RX ADMIN — Medication 650 MILLIGRAM(S): at 03:48

## 2020-07-18 RX ADMIN — Medication 0.5 MILLIGRAM(S): at 21:20

## 2020-07-18 NOTE — PROGRESS NOTE ADULT - ASSESSMENT
This is a 69 y/o male admitted to medicine for left sided numbness and weakness with CTA head/neck finding of right ICA dissection. Patient is accepted to ICU at this time for strict blood pressure monitoring with goal MAP between 100-140.    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 Dey) advised against transfer of patient; advised medical management  case discussed by MAR with Dr. Barrow - will admit patient to ICU for strict BP monitoring  goal MAP between 100-140 for adequate cerebral perfusion  off phenyleprine, on NS 100cc/hr  No evidence of dissection on doppler  vascular consulted Dr. Shannon   neuro checks q1 hr  MRI head/neck when downgraded    #Parkinson's disease  c/w Rytary  c/w cogentin    CVS:  #right ICA dissection  management as above  permissive hypertension  holding home antihypertensives   Can restart ASA/Plavix today per neuro  lipid panel shows cholesterol 136     Pulm:  no active issues  COVID-19 negative x1    GI:  no active issues  diet as tolerated    Renal:  no active issues  renal function wnl    Endo:  no active issues  5.8 HbA1C and TSH 1.87    ID:  no active issues    Prophylaxis:  full dose lovenox as advised by neuro  GI ppx with protonix    DISPO:  Patient is accepted to ICU for close hemodynamic monitoring and frequent neuro checks.

## 2020-07-18 NOTE — DIETITIAN INITIAL EVALUATION ADULT. - PERTINENT LABORATORY DATA
07-18 Na140 mmol/L Glu 107 mg/dL<H> K+ 3.5 mmol/L Cr  0.82 mg/dL BUN 11 mg/dL 07-18 Phos 3.6 mg/dL 07-18 Alb 3.5 g/dL 07-17 Chol 136 mg/dL LDL 78 mg/dL HDL 45 mg/dL Trig 64 mg/dL

## 2020-07-18 NOTE — DIETITIAN INITIAL EVALUATION ADULT. - PROBLEM SELECTOR PLAN 1
-p/w L arm and leg tingling  -CT Angio H& N: Linear filling defect in the distal right cervical ICA suspicious for a dissection.  -Neurosurgeon Dr. Dey at [place] was consulted who recommended medical management  -will start on full dose lovenox and statin  Goal -140 mm Hg to prevent stroke  neuro: Dr. Barrow

## 2020-07-18 NOTE — PROGRESS NOTE ADULT - ASSESSMENT
Although the carotid doppler could not confirm the dissection on the right ICA, it is likely that anatomical features of the patient (prominent calcification of neck cartilage) limited a detailed study of every part of the arteries. Plan continue permissive BOP and full anticoagulation for a further 24 hours. If stable he may discontinue anticoagulation after MRI Head and MRA head wo contrast  and MRA neck neck with contrast. Discussed with his primary neurologist Dr Veliz and explained that Dr Rodriguez neurovascular surgeon had been contacted in [place] who had advised conservative management. He agrees with anticogulation and current plans./ Continue current anti-Park medications

## 2020-07-18 NOTE — DIETITIAN INITIAL EVALUATION ADULT. - PERTINENT MEDS FT
MEDICATIONS  (STANDING):  atorvastatin 40 milliGRAM(s) Oral at bedtime  benztropine 0.5 milliGRAM(s) Oral at bedtime  carbidopa 48.75 mG/levodopa 195 mG ER 3 Capsule(s) Oral <User Schedule>  chlorhexidine 2% Cloths 1 Application(s) Topical <User Schedule>  clotrimazole 1% Cream 1 Application(s) Topical two times a day  enoxaparin Injectable 70 milliGRAM(s) SubCutaneous two times a day  pantoprazole    Tablet 40 milliGRAM(s) Oral before breakfast  phenylephrine    Infusion 0.1 MICROgram(s)/kG/Min (2.63 mL/Hr) IV Continuous <Continuous>    MEDICATIONS  (PRN):  acetaminophen   Tablet .. 650 milliGRAM(s) Oral every 6 hours PRN Temp greater or equal to 38C (100.4F), Mild Pain (1 - 3), Moderate Pain (4 - 6)

## 2020-07-18 NOTE — PROGRESS NOTE ADULT - SUBJECTIVE AND OBJECTIVE BOX
INTERVAL HPI/OVERNIGHT EVENTS:  69 y/o male with PMH of HTN, HLD and Parkinson disease come to ED s with numbness to the left arm that started 7/15 around 8pm. Hoping it would get better he waited until the next day and when it did not improve went to the ED. He denies any recent trauma to neck. He denies any upper or lower extremity weakness, slurring speech, confusion blurred vison, chest pain, palpitations, fever, cough or any other acute complaints.   In ED, /65, HR 53  PAST MEDICAL & SURGICAL HISTORY:  Parkinson disease  Other hyperlipidemia  Essential hypertension  No significant past surgical history    HEALTH ISSUES - PROBLEM Dx:  Need for prophylactic measure: Need for prophylactic measure  Parkinson disease: Parkinson disease  HTN (hypertension): HTN (hypertension)  Numbness and tingling in left arm: Numbness and tingling in left arm          MEDICATIONS  (STANDING):  atorvastatin 40 milliGRAM(s) Oral at bedtime  benztropine 0.5 milliGRAM(s) Oral at bedtime  carbidopa 48.75 mG/levodopa 195 mG ER 3 Capsule(s) Oral <User Schedule>  chlorhexidine 2% Cloths 1 Application(s) Topical <User Schedule>  clotrimazole 1% Cream 1 Application(s) Topical two times a day  enoxaparin Injectable 70 milliGRAM(s) SubCutaneous two times a day  pantoprazole    Tablet 40 milliGRAM(s) Oral before breakfast  phenylephrine    Infusion 0.1 MICROgram(s)/kG/Min (2.63 mL/Hr) IV Continuous <Continuous>    MEDICATIONS  (PRN):  acetaminophen   Tablet .. 650 milliGRAM(s) Oral every 6 hours PRN Temp greater or equal to 38C (100.4F), Mild Pain (1 - 3), Moderate Pain (4 - 6)      Allergies    No Known Allergies    Intolerances      REVIEW OF SYSTEMS:    CONSTITUTIONAL: No weakness, fatigue, malaise, fevers or chills, no weight change, appetite change  EYES: No visual changes; No double vision,  No vertigo, eye pain  Ears: no otalgia, no otorhea, no hearing loss, tinnitus  Nose: no epistaxis, rhinorrhea, post-discharge, sinus pressure  Throat: no throat pain, no oral lesions, tooth pain   NECK: No pain or stiffness  RESPIRATORY: No cough (productive or dry), wheezing, hemoptysis; No shortness of breath, orthnopnea, PND, BENNETT, snoring,  CARDIOVASCULAR: No chest pain or palpitations, no leg edema, no claudication    GASTROINTESTINAL: No abdominal or epigastric pain. No nausea, vomiting, or hematemesis; No diarrhea or constipation. No melena or hematochezia.  GENITOURINARY: No dysuria, frequency, urgency or hematuria, no pelvic pain, urinary incontinence, urgency  Muscloskeletal: no joints or muscle pain, no swelling in joints or muscles  NEUROLOGICAL: right numbness, no weakness, headache, memory loss, seizures, dizziness, vertigo, syncope, ataxia  SKIN: No pruritis, rashes, lesions or new moles  Psych: No anxiety, sadness, insomnia, suicide thoughts  Endocrine: No Heat or Cold intolerance, polydipsia, polyphagia  Heme/Lymph: no LN enlargement, no easy bruising or bleeding       Vital Signs Last 24 Hrs  T(C): 35.6 (18 Jul 2020 07:00), Max: 36.7 (17 Jul 2020 19:30)  T(F): 96 (18 Jul 2020 07:00), Max: 98.1 (17 Jul 2020 19:30)  HR: 77 (18 Jul 2020 15:15) (46 - 94)  BP: 160/147 (18 Jul 2020 13:07) (144/74 - 166/84)  BP(mean): 153 (18 Jul 2020 13:07) (90 - 153)  RR: 19 (18 Jul 2020 15:15) (10 - 23)  SpO2: 97% (18 Jul 2020 15:15) (96% - 100%)    PHYSICAL EXAM:      Constitutional: awake and alert.  HEENT: PERRLA, EOMI,   Neck: Supple.  Respiratory: Breath sounds are clear bilaterally  Cardiovascular: S1 and S2, regular / irregular rhythm  Gastrointestinal: soft, nontender  Extremities:  no edema  Vascular: Carotid Bruit - no  Musculoskeletal: no joint swelling/tenderness, no abnormal movements  Skin: No rashes    Neurological exam:  HF: A x O x 3. Appropriately interactive, normal affect. Speech fluent, No Aphasia or paraphasic errors. Naming /repetition intact   CN: MORGAN, EOMI, VFF, facial sensation diminished left, left NLFD, tongue midline, Palate moves equally, SCM equal bilaterally  Motor: No pronator drift, Strength 5/5 in all 4 ext, normal bulk and tone, no tremor, rigidity or bradykinesia.    Sens: Intact to light touch / PP/ VS/ JS  but diminished on the left side  Reflexes: Symmetric and normal . BJ 2+, BR 2+, KJ 2+, AJ 2+, downgoing toes b/l  Coord:  No FNFA, dysmetria, NEYMAR intact   Gait/Balance: Cannot test    NIHSS: 3    LABS:                        15.9   6.15  )-----------( 169      ( 18 Jul 2020 03:42 )             47.4     07-18    140  |  107  |  11  ----------------------------<  107<H>  3.5   |  25  |  0.82    Ca    8.8      18 Jul 2020 03:42  Phos  3.6     07-18  Mg     2.3     07-18    TPro  7.1  /  Alb  3.5  /  TBili  0.8  /  DBili  x   /  AST  15  /  ALT  8<L>  /  AlkPhos  53  07-18    PT/INR - ( 17 Jul 2020 06:53 )   PT: 13.5 sec;   INR: 1.16 ratio               RADIOLOGY & ADDITIONAL TESTS:  < from: US Duplex Carotid Arteries Complete, Bilateral (07.17.20 @ 14:13) >    EXAM:  US DPLX CAROTIDS COMPL BI                            PROCEDURE DATE:  07/17/2020          INTERPRETATION:  HISTORY: right ICA dissection; US for better anatomy evaluation    COMPARISON:CT of the neck performed on 07/16/2020    FINDINGS:   Mild intimal thickening. Patient refused imaging of the left neck. No dissection visualized.    Peak systolic velocities are as follows (in cm/sec):     RIGHT:    PROX CCA = 115 ;  DIST CCA = 97 ;  PROX ICA = 43 ;  DIST ICA = 66 ; ECA = 44     There is antegrade flow through both vertebral arteries.     IMPRESSION:   No hemodynamically significant right carotid artery stenoses. No visualized dissection on the right.    Patient refused imaging of the left neck.                  TONI PETIT M.D., ATTENDING RADIOLOGIST  This document has been electronically signed. Jul 17 2020  2:43PM    < end of copied text >

## 2020-07-18 NOTE — PROGRESS NOTE ADULT - SUBJECTIVE AND OBJECTIVE BOX
INTERVAL HPI/OVERNIGHT EVENTS: No acute events overnight. Off pressors. MAP remained between 100-140.     PRESSORS: [] YES [x] NO     WHICH:    Antimicrobial:    Cardiovascular:      Pulmonary:    Hematalogic:  enoxaparin Injectable 70 milliGRAM(s) SubCutaneous every 12 hours    Other:  acetaminophen   Tablet .. 650 milliGRAM(s) Oral every 6 hours PRN  atorvastatin 40 milliGRAM(s) Oral at bedtime  benztropine 0.5 milliGRAM(s) Oral at bedtime  carbidopa 48.75 mG/levodopa 195 mG ER 3 Capsule(s) Oral <User Schedule>  chlorhexidine 2% Cloths 1 Application(s) Topical <User Schedule>  pantoprazole    Tablet 40 milliGRAM(s) Oral before breakfast  sodium chloride 0.9%. 1000 milliLiter(s) IV Continuous <Continuous>    acetaminophen   Tablet .. 650 milliGRAM(s) Oral every 6 hours PRN  atorvastatin 40 milliGRAM(s) Oral at bedtime  benztropine 0.5 milliGRAM(s) Oral at bedtime  carbidopa 48.75 mG/levodopa 195 mG ER 3 Capsule(s) Oral <User Schedule>  chlorhexidine 2% Cloths 1 Application(s) Topical <User Schedule>  enoxaparin Injectable 70 milliGRAM(s) SubCutaneous every 12 hours  pantoprazole    Tablet 40 milliGRAM(s) Oral before breakfast  phenylephrine    Infusion 0.1 MICROgram(s)/kG/Min IV Continuous <Continuous>  sodium chloride 0.9%. 1000 milliLiter(s) IV Continuous <Continuous>    Drug Dosing Weight  Height (cm): 172.72 (17 Jul 2020 08:58)  Weight (kg): 70 (17 Jul 2020 08:58)  BMI (kg/m2): 23.5 (17 Jul 2020 08:58)  BSA (m2): 1.83 (17 Jul 2020 08:58)    CENTRAL LINE: [] YES [x] NO  LOCATION:   DATE INSERTED:  REMOVE: [] YES [] NO  EXPLAIN:    CHAKRABORTY: [] YES [x] NO    DATE INSERTED:  REMOVE:  [] YES [] NO  EXPLAIN: Strict IOs    A-LINE:  [x] YES [] NO  LOCATION:7/17   DATE INSERTED:  REMOVE:  [] YES [x] NO  EXPLAIN:BP monitoring    PMH -reviewed admission note, no change since admission  PAST MEDICAL & SURGICAL HISTORY:  Parkinson disease  Other hyperlipidemia  Essential hypertension  No significant past surgical history      ICU Vital Signs Last 24 Hrs  T(C): 36.7 (17 Jul 2020 19:30), Max: 36.8 (17 Jul 2020 01:49)  T(F): 98.1 (17 Jul 2020 19:30), Max: 98.2 (17 Jul 2020 01:49)  HR: 58 (17 Jul 2020 22:30) (46 - 88)  BP: 143/45 (17 Jul 2020 16:00) (143/45 - 179/83)  BP(mean): 70 (17 Jul 2020 16:00) (70 - 108)  ABP: 142/71 (17 Jul 2020 22:30) (111/74 - 223/99)  ABP(mean): 96 (17 Jul 2020 22:30) (70 - 155)  RR: 12 (17 Jul 2020 22:30) (10 - 29)  SpO2: 96% (17 Jul 2020 22:30) (93% - 100%)          PHYSICAL EXAM:    GENERAL: NAD, well-groomed, well-developed  HEAD:  Atraumatic, Normocephalic  EYES: EOMI, PERRLA, conjunctiva and sclera clear  ENMT: No tonsillar erythema, exudates, or enlargement; Moist mucous membranes, Good dentition, No lesions  NECK: Supple, normal appearance, No JVD; Normal thyroid; Trachea midline  NERVOUS SYSTEM:  Alert & Oriented X3, Good concentration; mild tremor noted. numbness in LUE   CHEST/LUNG: No chest deformity; Normal percussion bilaterally; No rales, rhonchi, wheezing   HEART: Regular rate and rhythm; No murmurs, rubs, or gallops  ABDOMEN: Soft, Nontender, Nondistended; Bowel sounds present  EXTREMITIES:  LUE 4/5 STRENGTH, bLE 5/5.   LYMPH: No lymphadenopathy noted  SKIN: No rashes or lesions; Good capillary refill      LABS:  CBC Full  -  ( 17 Jul 2020 06:53 )  WBC Count : 6.53 K/uL  RBC Count : 5.35 M/uL  Hemoglobin : 15.5 g/dL  Hematocrit : 46.7 %  Platelet Count - Automated : 193 K/uL  Mean Cell Volume : 87.3 fl  Mean Cell Hemoglobin : 29.0 pg  Mean Cell Hemoglobin Concentration : 33.2 gm/dL  Auto Neutrophil # : 5.00 K/uL  Auto Lymphocyte # : 1.02 K/uL  Auto Monocyte # : 0.38 K/uL  Auto Eosinophil # : 0.08 K/uL  Auto Basophil # : 0.04 K/uL  Auto Neutrophil % : 76.6 %  Auto Lymphocyte % : 15.6 %  Auto Monocyte % : 5.8 %  Auto Eosinophil % : 1.2 %  Auto Basophil % : 0.6 %    07-17    138  |  105  |  14  ----------------------------<  129<H>  4.0   |  23  |  0.95    Ca    8.3<L>      17 Jul 2020 06:53  Phos  2.8     07-17  Mg     2.2     07-17    TPro  7.2  /  Alb  3.5  /  TBili  0.4  /  DBili  x   /  AST  19  /  ALT  22  /  AlkPhos  52  07-17    PT/INR - ( 17 Jul 2020 06:53 )   PT: 13.5 sec;   INR: 1.16 ratio         PTT - ( 16 Jul 2020 15:43 )  PTT:30.1 sec        RADIOLOGY & ADDITIONAL STUDIES REVIEWED:      < from: US Duplex Carotid Arteries Complete, Bilateral (07.17.20 @ 14:13) >  EXAM:  US DPLX CAROTIDS COMPL BI                            PROCEDURE DATE:  07/17/2020          INTERPRETATION:  HISTORY: right ICA dissection; US for better anatomy evaluation    COMPARISON:CT of the neck performed on 07/16/2020    FINDINGS:   Mild intimal thickening. Patient refused imaging of the left neck. No dissection visualized.    Peak systolic velocities are as follows (in cm/sec):     RIGHT:    PROX CCA = 115 ;  DIST CCA = 97 ;  PROX ICA = 43 ;  DIST ICA = 66 ; ECA = 44     There is antegrade flow through both vertebral arteries.     IMPRESSION:   No hemodynamically significant right carotid artery stenoses. No visualized dissection on the right.    Patient refused imaging of the left neck.      < end of copied text >      GOALS OF CARE DISCUSSION WITH PATIENT/FAMILY/PROXY:

## 2020-07-18 NOTE — DIETITIAN INITIAL EVALUATION ADULT. - OTHER INFO
pt admitted to left sided weakness. s/p stroke. in ICU for BP monitoring. pt visited. per pt and RN pt consumes all food provided, no chewing/swallowing difficulties, no n/v/d. No edema noted.

## 2020-07-18 NOTE — PROGRESS NOTE ADULT - SUBJECTIVE AND OBJECTIVE BOX
Patient is a 70y old  Male who presents with a chief complaint of ICA dissection (18 Jul 2020 01:08)    pt seen in icu [x  ], reg med floor [   ], bed [ x ], chair at bedside [   ], a+o x3 [ x ], lethargic [  ],    nad [ x ]      Allergies    No Known Allergies        Vitals    T(F): 98.1 (07-17-20 @ 19:30), Max: 98.1 (07-17-20 @ 19:30)  HR: 75 (07-18-20 @ 06:00) (46 - 94)  BP: 153/70 (07-18-20 @ 04:00) (143/45 - 156/73)  RR: 23 (07-18-20 @ 06:00) (10 - 29)  SpO2: 100% (07-18-20 @ 06:00) (96% - 100%)  Wt(kg): --  CAPILLARY BLOOD GLUCOSE      POCT Blood Glucose.: 115 mg/dL (17 Jul 2020 16:02)      Labs                          15.9   6.15  )-----------( 169      ( 18 Jul 2020 03:42 )             47.4       07-18    140  |  107  |  11  ----------------------------<  107<H>  3.5   |  25  |  0.82    Ca    8.8      18 Jul 2020 03:42  Phos  3.6     07-18  Mg     2.3     07-18    TPro  7.1  /  Alb  3.5  /  TBili  0.8  /  DBili  x   /  AST  15  /  ALT  8<L>  /  AlkPhos  53  07-18      CARDIAC MARKERS ( 16 Jul 2020 15:43 )  <0.015 ng/mL / x     / x     / x     / x                Radiology Results      Meds    MEDICATIONS  (STANDING):  atorvastatin 40 milliGRAM(s) Oral at bedtime  benztropine 0.5 milliGRAM(s) Oral at bedtime  carbidopa 48.75 mG/levodopa 195 mG ER 3 Capsule(s) Oral <User Schedule>  chlorhexidine 2% Cloths 1 Application(s) Topical <User Schedule>  enoxaparin Injectable 70 milliGRAM(s) SubCutaneous every 12 hours  pantoprazole    Tablet 40 milliGRAM(s) Oral before breakfast  phenylephrine    Infusion 0.1 MICROgram(s)/kG/Min (2.63 mL/Hr) IV Continuous <Continuous>  sodium chloride 0.9%. 1000 milliLiter(s) (100 mL/Hr) IV Continuous <Continuous>      MEDICATIONS  (PRN):  acetaminophen   Tablet .. 650 milliGRAM(s) Oral every 6 hours PRN Temp greater or equal to 38C (100.4F), Mild Pain (1 - 3), Moderate Pain (4 - 6)      Physical Exam    Neuro :  no focal deficits  Respiratory: CTA B/L  CV: RRR, S1S2, no murmurs,   Abdominal: Soft, NT, ND +BS,  Extremities: No edema, + peripheral pulses    ASSESSMENT    Anesthesia of skin   right cervical ica disection  h/o Parkinson disease  Other hyperlipidemia  Essential hypertension        PLAN    cont icu monitoring  neuro cons   vascular cons  a-line placed on 7/17  goal MAP between 100-140 for adequate cerebral perfusion   at present; if below 100, add IV fluids and potentially pressors if needed  permissive hypertension  holding home antihypertensives   full dose lovenox as advised by neuro  GI ppx with protonix  COVID-19 negative x1   cont current meds  cont mgmt as per icu Patient is a 70y old  Male who presents with a chief complaint of ICA dissection (18 Jul 2020 01:08)    pt seen in icu [x  ], reg med floor [   ], bed [ x ], chair at bedside [   ], a+o x3 [ x ], lethargic [  ],    nad [ x ]      Allergies    No Known Allergies        Vitals    T(F): 98.1 (07-17-20 @ 19:30), Max: 98.1 (07-17-20 @ 19:30)  HR: 75 (07-18-20 @ 06:00) (46 - 94)  BP: 153/70 (07-18-20 @ 04:00) (143/45 - 156/73)  RR: 23 (07-18-20 @ 06:00) (10 - 29)  SpO2: 100% (07-18-20 @ 06:00) (96% - 100%)  Wt(kg): --  CAPILLARY BLOOD GLUCOSE      POCT Blood Glucose.: 115 mg/dL (17 Jul 2020 16:02)      Labs                          15.9   6.15  )-----------( 169      ( 18 Jul 2020 03:42 )             47.4       07-18    140  |  107  |  11  ----------------------------<  107<H>  3.5   |  25  |  0.82    Ca    8.8      18 Jul 2020 03:42  Phos  3.6     07-18  Mg     2.3     07-18    TPro  7.1  /  Alb  3.5  /  TBili  0.8  /  DBili  x   /  AST  15  /  ALT  8<L>  /  AlkPhos  53  07-18      CARDIAC MARKERS ( 16 Jul 2020 15:43 )  <0.015 ng/mL / x     / x     / x     / x                Radiology Results      < from: US Duplex Carotid Arteries Complete, Bilateral (07.17.20 @ 14:13) >  IMPRESSION:   No hemodynamically significant right carotid artery stenoses. No visualized dissection on the right.    Patient refused imaging of the left neck.    < end of copied text >        Meds    MEDICATIONS  (STANDING):  atorvastatin 40 milliGRAM(s) Oral at bedtime  benztropine 0.5 milliGRAM(s) Oral at bedtime  carbidopa 48.75 mG/levodopa 195 mG ER 3 Capsule(s) Oral <User Schedule>  chlorhexidine 2% Cloths 1 Application(s) Topical <User Schedule>  enoxaparin Injectable 70 milliGRAM(s) SubCutaneous every 12 hours  pantoprazole    Tablet 40 milliGRAM(s) Oral before breakfast  phenylephrine    Infusion 0.1 MICROgram(s)/kG/Min (2.63 mL/Hr) IV Continuous <Continuous>  sodium chloride 0.9%. 1000 milliLiter(s) (100 mL/Hr) IV Continuous <Continuous>      MEDICATIONS  (PRN):  acetaminophen   Tablet .. 650 milliGRAM(s) Oral every 6 hours PRN Temp greater or equal to 38C (100.4F), Mild Pain (1 - 3), Moderate Pain (4 - 6)      Physical Exam    Neuro :  no focal deficits  Respiratory: CTA B/L  CV: RRR, S1S2, no murmurs,   Abdominal: Soft, NT, ND +BS,  Extremities: No edema, + peripheral pulses    ASSESSMENT    right Carotid dissection SHIREEN with stroke  h/o Parkinson disease  Other hyperlipidemia  Essential hypertension        PLAN    cont icu monitoring  neuro cons noted   No IV tpa given because beyond time window and non-disabling deficits  Lovenox initially full anticoagulation protocol  start ASA/ PLavix after 48-72 h Lovenox  Maintain permissive BP: parameters -130. Low pulse pressure  Statin  Dysphagia screen  f/u MRI/A brain  f/u MRA carotids after 72 h  vascular cons noted   no vascular surgery intervention at this time  a-line placed on 7/17   at present; if below 100, add IV fluids and potentially pressors if needed  permissive hypertension  holding home antihypertensives   full dose lovenox as advised by neuro  GI ppx with protonix  COVID-19 negative x1   cont current meds  cont mgmt as per icu

## 2020-07-19 LAB
ALBUMIN SERPL ELPH-MCNC: 3.4 G/DL — LOW (ref 3.5–5)
ALP SERPL-CCNC: 50 U/L — SIGNIFICANT CHANGE UP (ref 40–120)
ALT FLD-CCNC: 9 U/L DA — LOW (ref 10–60)
ANION GAP SERPL CALC-SCNC: 7 MMOL/L — SIGNIFICANT CHANGE UP (ref 5–17)
AST SERPL-CCNC: 21 U/L — SIGNIFICANT CHANGE UP (ref 10–40)
BASOPHILS # BLD AUTO: 0.05 K/UL — SIGNIFICANT CHANGE UP (ref 0–0.2)
BASOPHILS NFR BLD AUTO: 0.6 % — SIGNIFICANT CHANGE UP (ref 0–2)
BILIRUB SERPL-MCNC: 1 MG/DL — SIGNIFICANT CHANGE UP (ref 0.2–1.2)
BUN SERPL-MCNC: 8 MG/DL — SIGNIFICANT CHANGE UP (ref 7–18)
CALCIUM SERPL-MCNC: 8.7 MG/DL — SIGNIFICANT CHANGE UP (ref 8.4–10.5)
CHLORIDE SERPL-SCNC: 106 MMOL/L — SIGNIFICANT CHANGE UP (ref 96–108)
CO2 SERPL-SCNC: 27 MMOL/L — SIGNIFICANT CHANGE UP (ref 22–31)
CREAT SERPL-MCNC: 0.71 MG/DL — SIGNIFICANT CHANGE UP (ref 0.5–1.3)
EOSINOPHIL # BLD AUTO: 0.1 K/UL — SIGNIFICANT CHANGE UP (ref 0–0.5)
EOSINOPHIL NFR BLD AUTO: 1.2 % — SIGNIFICANT CHANGE UP (ref 0–6)
GLUCOSE SERPL-MCNC: 90 MG/DL — SIGNIFICANT CHANGE UP (ref 70–99)
HCT VFR BLD CALC: 46.4 % — SIGNIFICANT CHANGE UP (ref 39–50)
HGB BLD-MCNC: 15.8 G/DL — SIGNIFICANT CHANGE UP (ref 13–17)
IMM GRANULOCYTES NFR BLD AUTO: 0.4 % — SIGNIFICANT CHANGE UP (ref 0–1.5)
LYMPHOCYTES # BLD AUTO: 0.8 K/UL — LOW (ref 1–3.3)
LYMPHOCYTES # BLD AUTO: 9.9 % — LOW (ref 13–44)
MAGNESIUM SERPL-MCNC: 2.1 MG/DL — SIGNIFICANT CHANGE UP (ref 1.6–2.6)
MCHC RBC-ENTMCNC: 29 PG — SIGNIFICANT CHANGE UP (ref 27–34)
MCHC RBC-ENTMCNC: 34.1 GM/DL — SIGNIFICANT CHANGE UP (ref 32–36)
MCV RBC AUTO: 85.3 FL — SIGNIFICANT CHANGE UP (ref 80–100)
MONOCYTES # BLD AUTO: 0.47 K/UL — SIGNIFICANT CHANGE UP (ref 0–0.9)
MONOCYTES NFR BLD AUTO: 5.8 % — SIGNIFICANT CHANGE UP (ref 2–14)
NEUTROPHILS # BLD AUTO: 6.67 K/UL — SIGNIFICANT CHANGE UP (ref 1.8–7.4)
NEUTROPHILS NFR BLD AUTO: 82.1 % — HIGH (ref 43–77)
NRBC # BLD: 0 /100 WBCS — SIGNIFICANT CHANGE UP (ref 0–0)
PHOSPHATE SERPL-MCNC: 2.7 MG/DL — SIGNIFICANT CHANGE UP (ref 2.5–4.5)
PLATELET # BLD AUTO: 162 K/UL — SIGNIFICANT CHANGE UP (ref 150–400)
POTASSIUM SERPL-MCNC: 3.8 MMOL/L — SIGNIFICANT CHANGE UP (ref 3.5–5.3)
POTASSIUM SERPL-SCNC: 3.8 MMOL/L — SIGNIFICANT CHANGE UP (ref 3.5–5.3)
PROT SERPL-MCNC: 7.1 G/DL — SIGNIFICANT CHANGE UP (ref 6–8.3)
RBC # BLD: 5.44 M/UL — SIGNIFICANT CHANGE UP (ref 4.2–5.8)
RBC # FLD: 12.2 % — SIGNIFICANT CHANGE UP (ref 10.3–14.5)
SODIUM SERPL-SCNC: 140 MMOL/L — SIGNIFICANT CHANGE UP (ref 135–145)
WBC # BLD: 8.12 K/UL — SIGNIFICANT CHANGE UP (ref 3.8–10.5)
WBC # FLD AUTO: 8.12 K/UL — SIGNIFICANT CHANGE UP (ref 3.8–10.5)

## 2020-07-19 PROCEDURE — 99291 CRITICAL CARE FIRST HOUR: CPT

## 2020-07-19 RX ORDER — SODIUM CHLORIDE 9 MG/ML
500 INJECTION INTRAMUSCULAR; INTRAVENOUS; SUBCUTANEOUS ONCE
Refills: 0 | Status: COMPLETED | OUTPATIENT
Start: 2020-07-19 | End: 2020-07-19

## 2020-07-19 RX ORDER — LOSARTAN POTASSIUM 100 MG/1
25 TABLET, FILM COATED ORAL DAILY
Refills: 0 | Status: DISCONTINUED | OUTPATIENT
Start: 2020-07-19 | End: 2020-07-22

## 2020-07-19 RX ORDER — ASPIRIN/CALCIUM CARB/MAGNESIUM 324 MG
81 TABLET ORAL DAILY
Refills: 0 | Status: DISCONTINUED | OUTPATIENT
Start: 2020-07-19 | End: 2020-07-22

## 2020-07-19 RX ORDER — SODIUM CHLORIDE 9 MG/ML
1000 INJECTION INTRAMUSCULAR; INTRAVENOUS; SUBCUTANEOUS
Refills: 0 | Status: DISCONTINUED | OUTPATIENT
Start: 2020-07-19 | End: 2020-07-20

## 2020-07-19 RX ORDER — CLOPIDOGREL BISULFATE 75 MG/1
75 TABLET, FILM COATED ORAL DAILY
Refills: 0 | Status: DISCONTINUED | OUTPATIENT
Start: 2020-07-19 | End: 2020-07-22

## 2020-07-19 RX ORDER — SENNA PLUS 8.6 MG/1
2 TABLET ORAL AT BEDTIME
Refills: 0 | Status: DISCONTINUED | OUTPATIENT
Start: 2020-07-19 | End: 2020-07-22

## 2020-07-19 RX ADMIN — ENOXAPARIN SODIUM 70 MILLIGRAM(S): 100 INJECTION SUBCUTANEOUS at 17:10

## 2020-07-19 RX ADMIN — PHENYLEPHRINE HYDROCHLORIDE 2.63 MICROGRAM(S)/KG/MIN: 10 INJECTION INTRAVENOUS at 00:11

## 2020-07-19 RX ADMIN — CLOPIDOGREL BISULFATE 75 MILLIGRAM(S): 75 TABLET, FILM COATED ORAL at 14:18

## 2020-07-19 RX ADMIN — SODIUM CHLORIDE 2000 MILLILITER(S): 9 INJECTION INTRAMUSCULAR; INTRAVENOUS; SUBCUTANEOUS at 21:12

## 2020-07-19 RX ADMIN — Medication 0.5 MILLIGRAM(S): at 21:44

## 2020-07-19 RX ADMIN — Medication 1 APPLICATION(S): at 17:11

## 2020-07-19 RX ADMIN — ATORVASTATIN CALCIUM 40 MILLIGRAM(S): 80 TABLET, FILM COATED ORAL at 21:44

## 2020-07-19 RX ADMIN — CARBIDOPA AND LEVODOPA 3 CAPSULE(S): 25; 100 TABLET ORAL at 14:18

## 2020-07-19 RX ADMIN — CHLORHEXIDINE GLUCONATE 1 APPLICATION(S): 213 SOLUTION TOPICAL at 05:58

## 2020-07-19 RX ADMIN — ENOXAPARIN SODIUM 70 MILLIGRAM(S): 100 INJECTION SUBCUTANEOUS at 05:58

## 2020-07-19 RX ADMIN — PANTOPRAZOLE SODIUM 40 MILLIGRAM(S): 20 TABLET, DELAYED RELEASE ORAL at 06:44

## 2020-07-19 RX ADMIN — Medication 81 MILLIGRAM(S): at 14:19

## 2020-07-19 RX ADMIN — CARBIDOPA AND LEVODOPA 3 CAPSULE(S): 25; 100 TABLET ORAL at 17:13

## 2020-07-19 RX ADMIN — Medication 650 MILLIGRAM(S): at 17:45

## 2020-07-19 RX ADMIN — Medication 650 MILLIGRAM(S): at 17:11

## 2020-07-19 RX ADMIN — SENNA PLUS 2 TABLET(S): 8.6 TABLET ORAL at 21:44

## 2020-07-19 RX ADMIN — Medication 1 APPLICATION(S): at 05:58

## 2020-07-19 RX ADMIN — CARBIDOPA AND LEVODOPA 3 CAPSULE(S): 25; 100 TABLET ORAL at 06:44

## 2020-07-19 RX ADMIN — SODIUM CHLORIDE 100 MILLILITER(S): 9 INJECTION INTRAMUSCULAR; INTRAVENOUS; SUBCUTANEOUS at 22:50

## 2020-07-19 NOTE — PHYSICAL THERAPY INITIAL EVALUATION ADULT - ADDITIONAL COMMENTS
Patient lives on 1st floor of a bldg with elevator access. He reports he uses the stairs to access his apt navigating 14 steps with rails assist. Patient ambulates without any assistive device and is independent in all ADL's at home. Pt also reports walking around his neighborhood for approximately 2 hrs every day.

## 2020-07-19 NOTE — PHYSICAL THERAPY INITIAL EVALUATION ADULT - PERTINENT HX OF CURRENT PROBLEM, REHAB EVAL
69 y/o male with PMHx of HTN, HLD and Parkinson disease. Presents to ED with numbness to the left arm.

## 2020-07-19 NOTE — PROGRESS NOTE ADULT - SUBJECTIVE AND OBJECTIVE BOX
Patient is a 70y old  Male who presents with a chief complaint of Carotid artery dissection (19 Jul 2020 03:26)    pt seen in icu [x  ], reg med floor [   ], bed [ x ], chair at bedside [   ], a+o x3 [ x ], lethargic [  ],    nad [ x ]      Allergies    No Known Allergies        Vitals    T(F): 97.8 (07-19-20 @ 00:00), Max: 98.1 (07-18-20 @ 19:15)  HR: 58 (07-19-20 @ 06:30) (50 - 94)  BP: 145/68 (07-19-20 @ 04:00) (101/70 - 166/84)  RR: 15 (07-19-20 @ 06:30) (10 - 26)  SpO2: 98% (07-19-20 @ 06:30) (95% - 100%)  Wt(kg): --  CAPILLARY BLOOD GLUCOSE      POCT Blood Glucose.: 115 mg/dL (17 Jul 2020 16:02)      Labs                          15.9   6.15  )-----------( 169      ( 18 Jul 2020 03:42 )             47.4       07-18    140  |  107  |  11  ----------------------------<  107<H>  3.5   |  25  |  0.82    Ca    8.8      18 Jul 2020 03:42  Phos  3.6     07-18  Mg     2.3     07-18    TPro  7.1  /  Alb  3.5  /  TBili  0.8  /  DBili  x   /  AST  15  /  ALT  8<L>  /  AlkPhos  53  07-18                Radiology Results      Meds    MEDICATIONS  (STANDING):  aspirin  chewable 81 milliGRAM(s) Oral daily  atorvastatin 40 milliGRAM(s) Oral at bedtime  benztropine 0.5 milliGRAM(s) Oral at bedtime  carbidopa 48.75 mG/levodopa 195 mG ER 3 Capsule(s) Oral <User Schedule>  chlorhexidine 2% Cloths 1 Application(s) Topical <User Schedule>  clopidogrel Tablet 75 milliGRAM(s) Oral daily  clotrimazole 1% Cream 1 Application(s) Topical two times a day  enoxaparin Injectable 70 milliGRAM(s) SubCutaneous two times a day  pantoprazole    Tablet 40 milliGRAM(s) Oral before breakfast  phenylephrine    Infusion 0.1 MICROgram(s)/kG/Min (2.63 mL/Hr) IV Continuous <Continuous>      MEDICATIONS  (PRN):  acetaminophen   Tablet .. 650 milliGRAM(s) Oral every 6 hours PRN Temp greater or equal to 38C (100.4F), Mild Pain (1 - 3), Moderate Pain (4 - 6)      Physical Exam    Neuro :  no focal deficits  Respiratory: CTA B/L  CV: RRR, S1S2, no murmurs,   Abdominal: Soft, NT, ND +BS,  Extremities: No edema, + peripheral pulses    ASSESSMENT    right Carotid dissection SHIREEN with stroke  h/o Parkinson disease  Other hyperlipidemia  Essential hypertension        PLAN    cont icu monitoring  neuro cons noted   No IV tpa given because beyond time window and non-disabling deficits  Lovenox initially full anticoagulation protocol  start ASA/ PLavix after 48-72 h Lovenox  Maintain permissive BP: parameters -130. Low pulse pressure  Statin  Dysphagia screen  f/u MRI/A brain  f/u MRA carotids after 72 h  vascular cons noted   no vascular surgery intervention at this time  a-line placed on 7/17   at present; if below 100, add IV fluids and potentially pressors if needed  permissive hypertension  holding home antihypertensives   full dose lovenox as advised by neuro  GI ppx with protonix  COVID-19 negative x1   cont current meds  cont mgmt as per icu Patient is a 70y old  Male who presents with a chief complaint of Carotid artery dissection (19 Jul 2020 03:26)    pt seen in icu [x  ], reg med floor [   ], bed [ x ], chair at bedside [   ], a+o x3 [ x ], lethargic [  ],    nad [ x ]      Allergies    No Known Allergies        Vitals    T(F): 97.8 (07-19-20 @ 00:00), Max: 98.1 (07-18-20 @ 19:15)  HR: 58 (07-19-20 @ 06:30) (50 - 94)  BP: 145/68 (07-19-20 @ 04:00) (101/70 - 166/84)  RR: 15 (07-19-20 @ 06:30) (10 - 26)  SpO2: 98% (07-19-20 @ 06:30) (95% - 100%)  Wt(kg): --  CAPILLARY BLOOD GLUCOSE      POCT Blood Glucose.: 115 mg/dL (17 Jul 2020 16:02)      Labs                          15.9   6.15  )-----------( 169      ( 18 Jul 2020 03:42 )             47.4       07-18    140  |  107  |  11  ----------------------------<  107<H>  3.5   |  25  |  0.82    Ca    8.8      18 Jul 2020 03:42  Phos  3.6     07-18  Mg     2.3     07-18    TPro  7.1  /  Alb  3.5  /  TBili  0.8  /  DBili  x   /  AST  15  /  ALT  8<L>  /  AlkPhos  53  07-18                Radiology Results      Meds    MEDICATIONS  (STANDING):  aspirin  chewable 81 milliGRAM(s) Oral daily  atorvastatin 40 milliGRAM(s) Oral at bedtime  benztropine 0.5 milliGRAM(s) Oral at bedtime  carbidopa 48.75 mG/levodopa 195 mG ER 3 Capsule(s) Oral <User Schedule>  chlorhexidine 2% Cloths 1 Application(s) Topical <User Schedule>  clopidogrel Tablet 75 milliGRAM(s) Oral daily  clotrimazole 1% Cream 1 Application(s) Topical two times a day  enoxaparin Injectable 70 milliGRAM(s) SubCutaneous two times a day  pantoprazole    Tablet 40 milliGRAM(s) Oral before breakfast  phenylephrine    Infusion 0.1 MICROgram(s)/kG/Min (2.63 mL/Hr) IV Continuous <Continuous>      MEDICATIONS  (PRN):  acetaminophen   Tablet .. 650 milliGRAM(s) Oral every 6 hours PRN Temp greater or equal to 38C (100.4F), Mild Pain (1 - 3), Moderate Pain (4 - 6)      Physical Exam    Neuro :  no focal deficits  Respiratory: CTA B/L  CV: RRR, S1S2, no murmurs,   Abdominal: Soft, NT, ND +BS,  Extremities: No edema, + peripheral pulses    ASSESSMENT    right Carotid dissection SHIREEN with stroke  h/o Parkinson disease  Other hyperlipidemia  Essential hypertension        PLAN    cont icu monitoring  neuro cons noted   No IV tpa given because beyond time window and non-disabling deficits  Lovenox initially full anticoagulation protocol  cont ASA and PLavix   cont Lovenox  Maintain permissive BP: parameters -130. Low pulse pressure  Statin  Dysphagia screen  f/u MRI/A brain  f/u MRA carotids after 72 h   Plan continue permissive BOP and full anticoagulation for a further 24 hours.   If stable he may discontinue anticoagulation after MRI Head and MRA head wo contrast  and MRA neck neck with contrast.   Discussed with his primary neurologist Dr Veliz and explained that Dr Rodriguez neurovascular surgeon had been contacted in [place] who had advised conservative management.   He agrees with anticogulation and current plans  vascular cons noted   no vascular surgery intervention at this time  a-line placed on 7/17  holding home antihypertensives   GI ppx with protonix  COVID-19 negative x1   cont current meds  cont mgmt as per icu

## 2020-07-19 NOTE — PROGRESS NOTE ADULT - SUBJECTIVE AND OBJECTIVE BOX
INTERVAL HPI/OVERNIGHT EVENTS: none      71 y/o male with PMH of HTN, HLD and Parkinson disease come to ED s with numbness to the left arm that started 7/15 around 8pm. Hoping it would get better he waited until the next day and when it did not improve went to the ED. He denies any recent trauma to neck. He denies any upper or lower extremity weakness, slurring speech, confusion blurred vison, chest pain, palpitations, fever, cough or any other acute complaints. HEALTH ISSUES - PROBLEM Dx:  Need for prophylactic measure: Need for prophylactic measure  Parkinson disease: Parkinson disease  HTN (hypertension): HTN (hypertension)  Numbness and tingling in left arm: Numbness and tingling in left arm          MEDICATIONS  (STANDING):  aspirin  chewable 81 milliGRAM(s) Oral daily  atorvastatin 40 milliGRAM(s) Oral at bedtime  benztropine 0.5 milliGRAM(s) Oral at bedtime  carbidopa 48.75 mG/levodopa 195 mG ER 3 Capsule(s) Oral <User Schedule>  chlorhexidine 2% Cloths 1 Application(s) Topical <User Schedule>  clopidogrel Tablet 75 milliGRAM(s) Oral daily  clotrimazole 1% Cream 1 Application(s) Topical two times a day  enoxaparin Injectable 70 milliGRAM(s) SubCutaneous two times a day  pantoprazole    Tablet 40 milliGRAM(s) Oral before breakfast  phenylephrine    Infusion 0.1 MICROgram(s)/kG/Min (2.63 mL/Hr) IV Continuous <Continuous>    MEDICATIONS  (PRN):  acetaminophen   Tablet .. 650 milliGRAM(s) Oral every 6 hours PRN Temp greater or equal to 38C (100.4F), Mild Pain (1 - 3), Moderate Pain (4 - 6)      Allergies    No Known Allergies    Intolerances        REVIEW OF SYSTEMS    CONSTITUTIONAL: No weakness, fatigue, malaise, fevers or chills, no weight change, appetite change  EYES: No visual changes; No double vision,  No vertigo, eye pain  Ears: no otalgia, no otorhea, no hearing loss, tinnitus  Nose: no epistaxis, rhinorrhea, post-discharge, sinus pressure  Throat: no throat pain, no oral lesions, tooth pain   NECK: No pain or stiffness  RESPIRATORY: No cough (productive or dry), wheezing, hemoptysis; No shortness of breath, orthnopnea, PND, BENNETT, snoring,  CARDIOVASCULAR: No chest pain or palpitations, no leg edema, no claudication    GASTROINTESTINAL: No abdominal or epigastric pain. No nausea, vomiting, or hematemesis; No diarrhea or constipation. No melena or hematochezia.  GENITOURINARY: No dysuria, frequency, urgency or hematuria, no pelvic pain, urinary incontinence, urgency  Muscloskeletal: no joints or muscle pain, no swelling in joints or muscles  NEUROLOGICAL: right numbness, no weakness, headache, memory loss, seizures, dizziness, vertigo, syncope, ataxia  SKIN: No pruritis, rashes, lesions or new moles  Psych: No anxiety, sadness, insomnia, suicide thoughts  Endocrine: No Heat or Cold intolerance, polydipsia, polyphagia  Heme/Lymph: no LN enlargement, no easy bruising or bleedingVital Signs Last 24 Hrs  T(C): 37.1 (19 Jul 2020 07:00), Max: 37.1 (19 Jul 2020 05:30)  T(F): 98.8 (19 Jul 2020 07:00), Max: 98.8 (19 Jul 2020 07:00)  HR: 72 (19 Jul 2020 13:45) (50 - 88)  BP: 107/89 (19 Jul 2020 12:00) (101/70 - 147/69)  BP(mean): 96 (19 Jul 2020 12:00) (78 - 124)  RR: 24 (19 Jul 2020 13:45) (10 - 26)  SpO2: 99% (19 Jul 2020 13:45) (92% - 100%)    PHYSICAL EXAM:    Constitutional: awake and alert.  HEENT: PERRLA, EOMI,   Neck: Supple.  Respiratory: Breath sounds are clear bilaterally  Cardiovascular: S1 and S2, regular / irregular rhythm  Gastrointestinal: soft, nontender  Extremities:  no edema  Vascular: Carotid Bruit - no  Musculoskeletal: no joint swelling/tenderness, no abnormal movements  Skin: No rashes    Neurological exam:  HF: A x O x 3. Appropriately interactive, normal affect. Speech fluent, No Aphasia or paraphasic errors. Naming /repetition intact   CN: MORGAN, EOMI, VFF, facial sensation diminished left, left NLFD, tongue midline, Palate moves equally, SCM equal bilaterally  Motor: No pronator drift, Strength 5/5 in all 4 ext, normal bulk and tone, no tremor, rigidity or bradykinesia.    Sens: Intact to light touch / PP/ VS/ JS  but diminished on the left side  Reflexes: Symmetric and normal . BJ 2+, BR 2+, KJ 2+, AJ 2+, downgoing toes b/l  Coord:  No FNFA, dysmetria, NEYMAR intact   Gait/Balance: Cannot test    NIHSS: 3          LABS:                        15.8   8.12  )-----------( 162      ( 19 Jul 2020 06:46 )             46.4     07-19    140  |  106  |  8   ----------------------------<  90  3.8   |  27  |  0.71    Ca    8.7      19 Jul 2020 06:46  Phos  2.7     07-19  Mg     2.1     07-19    TPro  7.1  /  Alb  3.4<L>  /  TBili  1.0  /  DBili  x   /  AST  21  /  ALT  9<L>  /  AlkPhos  50  07-19          RADIOLOGY & ADDITIONAL TESTS:

## 2020-07-19 NOTE — PHYSICAL THERAPY INITIAL EVALUATION ADULT - CRITERIA FOR SKILLED THERAPEUTIC INTERVENTIONS
functional limitations in following categories/anticipated discharge recommendation/therapy frequency/risk reduction/prevention/impairments found/rehab potential/predicted duration of therapy intervention/anticipated equipment needs at discharge

## 2020-07-19 NOTE — PROGRESS NOTE ADULT - SUBJECTIVE AND OBJECTIVE BOX
INTERVAL HPI/OVERNIGHT EVENTS:     - pt is on and off pheny (0.4mcg/h) depends on BP  - No major event happened overnight      Antimicrobial:    Cardiovascular:  phenylephrine    Infusion 0.1 MICROgram(s)/kG/Min IV Continuous <Continuous>    Pulmonary:    Hematalogic:  enoxaparin Injectable 70 milliGRAM(s) SubCutaneous two times a day    Other:  acetaminophen   Tablet .. 650 milliGRAM(s) Oral every 6 hours PRN  atorvastatin 40 milliGRAM(s) Oral at bedtime  benztropine 0.5 milliGRAM(s) Oral at bedtime  carbidopa 48.75 mG/levodopa 195 mG ER 3 Capsule(s) Oral <User Schedule>  chlorhexidine 2% Cloths 1 Application(s) Topical <User Schedule>  clotrimazole 1% Cream 1 Application(s) Topical two times a day  pantoprazole    Tablet 40 milliGRAM(s) Oral before breakfast    acetaminophen   Tablet .. 650 milliGRAM(s) Oral every 6 hours PRN  atorvastatin 40 milliGRAM(s) Oral at bedtime  benztropine 0.5 milliGRAM(s) Oral at bedtime  carbidopa 48.75 mG/levodopa 195 mG ER 3 Capsule(s) Oral <User Schedule>  chlorhexidine 2% Cloths 1 Application(s) Topical <User Schedule>  clotrimazole 1% Cream 1 Application(s) Topical two times a day  enoxaparin Injectable 70 milliGRAM(s) SubCutaneous two times a day  pantoprazole    Tablet 40 milliGRAM(s) Oral before breakfast  phenylephrine    Infusion 0.1 MICROgram(s)/kG/Min IV Continuous <Continuous>    Drug Dosing Weight  Height (cm): 172.72 (17 Jul 2020 08:58)  Weight (kg): 70 (17 Jul 2020 08:58)  BMI (kg/m2): 23.5 (17 Jul 2020 08:58)  BSA (m2): 1.83 (17 Jul 2020 08:58)    PRESSORS: [x ] YES [ ]No  WHICH: pheny    CENTRAL LINE: [] YES [x] NO  LOCATION:   DATE INSERTED:  REMOVE: [] YES [] NO  EXPLAIN:    CHAKRABORTY: [] YES [x] NO    DATE INSERTED:  REMOVE:  [] YES [] NO  EXPLAIN: Strict IOs    A-LINE:  [x] YES [] NO  LOCATION:7/17   DATE INSERTED:  REMOVE:  [] YES [x] NO  EXPLAIN:BP monitoring    PMH -reviewed admission note, no change since admission  PAST MEDICAL & SURGICAL HISTORY:  Parkinson disease  Other hyperlipidemia  Essential hypertension  No significant past surgical history      ICU Vital Signs Last 24 Hrs  T(C): 36.7 (18 Jul 2020 19:15), Max: 36.7 (18 Jul 2020 16:00)  T(F): 98.1 (18 Jul 2020 19:15), Max: 98.1 (18 Jul 2020 19:15)  HR: 56 (19 Jul 2020 01:30) (55 - 94)  BP: 128/117 (19 Jul 2020 00:00) (101/70 - 166/84)  BP(mean): 122 (19 Jul 2020 00:00) (78 - 153)  ABP: 194/81 (19 Jul 2020 01:30) (123/63 - 203/87)  ABP(mean): 121 (19 Jul 2020 01:30) (18 - 134)  RR: 20 (19 Jul 2020 01:30) (10 - 23)  SpO2: 99% (19 Jul 2020 01:30) (96% - 100%)            07-17 @ 07:01  -  07-18 @ 07:00  --------------------------------------------------------  IN: 904 mL / OUT: 1250 mL / NET: -346 mL            PHYSICAL EXAM:    GEGENERAL: NAD, well-groomed, well-developed  HEAD:  Atraumatic, Normocephalic  EYES: EOMI, PERRLA, conjunctiva and sclera clear  ENMT: No tonsillar erythema, exudates, or enlargement; Moist mucous membranes, Good dentition, No lesions  NECK: Supple, normal appearance, No JVD; Normal thyroid; Trachea midline  NERVOUS SYSTEM:  Alert & Oriented X3, Good concentration; mild tremor noted. numbness in LUE   CHEST/LUNG: No chest deformity; Normal percussion bilaterally; No rales, rhonchi, wheezing   HEART: Regular rate and rhythm; No murmurs, rubs, or gallops  ABDOMEN: Soft, Nontender, Nondistended; Bowel sounds present  EXTREMITIES:  LUE 4/5 STRENGTH, bLE 5/5.   LYMPH: No lymphadenopathy noted  SKIN: No rashes or lesions; Good capillary refill        LABS:  CBC Full  -  ( 18 Jul 2020 03:42 )  WBC Count : 6.15 K/uL  RBC Count : 5.52 M/uL  Hemoglobin : 15.9 g/dL  Hematocrit : 47.4 %  Platelet Count - Automated : 169 K/uL  Mean Cell Volume : 85.9 fl  Mean Cell Hemoglobin : 28.8 pg  Mean Cell Hemoglobin Concentration : 33.5 gm/dL  Auto Neutrophil # : 4.07 K/uL  Auto Lymphocyte # : 1.40 K/uL  Auto Monocyte # : 0.44 K/uL  Auto Eosinophil # : 0.18 K/uL  Auto Basophil # : 0.05 K/uL  Auto Neutrophil % : 66.1 %  Auto Lymphocyte % : 22.8 %  Auto Monocyte % : 7.2 %  Auto Eosinophil % : 2.9 %  Auto Basophil % : 0.8 %    07-18    140  |  107  |  11  ----------------------------<  107<H>  3.5   |  25  |  0.82    Ca    8.8      18 Jul 2020 03:42  Phos  3.6     07-18  Mg     2.3     07-18    TPro  7.1  /  Alb  3.5  /  TBili  0.8  /  DBili  x   /  AST  15  /  ALT  8<L>  /  AlkPhos  53  07-18    PT/INR - ( 17 Jul 2020 06:53 )   PT: 13.5 sec;   INR: 1.16 ratio                 RADIOLOGY & ADDITIONAL STUDIES REVIEWED:  ***    [ ]GOALS OF CARE DISCUSSION WITH PATIENT/FAMILY/PROXY:    CRITICAL CARE TIME SPENT: 35 minutes INTERVAL HPI/OVERNIGHT EVENTS:     - pt is on and off pheny (0.4mcg/h) depends on BP   - No major event happened overnight      Antimicrobial:    Cardiovascular:  phenylephrine    Infusion 0.1 MICROgram(s)/kG/Min IV Continuous <Continuous>    Pulmonary:    Hematalogic:  enoxaparin Injectable 70 milliGRAM(s) SubCutaneous two times a day    Other:  acetaminophen   Tablet .. 650 milliGRAM(s) Oral every 6 hours PRN  atorvastatin 40 milliGRAM(s) Oral at bedtime  benztropine 0.5 milliGRAM(s) Oral at bedtime  carbidopa 48.75 mG/levodopa 195 mG ER 3 Capsule(s) Oral <User Schedule>  chlorhexidine 2% Cloths 1 Application(s) Topical <User Schedule>  clotrimazole 1% Cream 1 Application(s) Topical two times a day  pantoprazole    Tablet 40 milliGRAM(s) Oral before breakfast    acetaminophen   Tablet .. 650 milliGRAM(s) Oral every 6 hours PRN  atorvastatin 40 milliGRAM(s) Oral at bedtime  benztropine 0.5 milliGRAM(s) Oral at bedtime  carbidopa 48.75 mG/levodopa 195 mG ER 3 Capsule(s) Oral <User Schedule>  chlorhexidine 2% Cloths 1 Application(s) Topical <User Schedule>  clotrimazole 1% Cream 1 Application(s) Topical two times a day  enoxaparin Injectable 70 milliGRAM(s) SubCutaneous two times a day  pantoprazole    Tablet 40 milliGRAM(s) Oral before breakfast  phenylephrine    Infusion 0.1 MICROgram(s)/kG/Min IV Continuous <Continuous>    Drug Dosing Weight  Height (cm): 172.72 (17 Jul 2020 08:58)  Weight (kg): 70 (17 Jul 2020 08:58)  BMI (kg/m2): 23.5 (17 Jul 2020 08:58)  BSA (m2): 1.83 (17 Jul 2020 08:58)    PRESSORS: [x ] YES [ ]No  WHICH: pheny    CENTRAL LINE: [] YES [x] NO  LOCATION:   DATE INSERTED:  REMOVE: [] YES [] NO  EXPLAIN:    CHAKRABORTY: [] YES [x] NO    DATE INSERTED:  REMOVE:  [] YES [] NO  EXPLAIN: Strict IOs    A-LINE:  [x] YES [] NO  LOCATION:7/17   DATE INSERTED:  REMOVE:  [] YES [x] NO  EXPLAIN:BP monitoring    PMH -reviewed admission note, no change since admission  PAST MEDICAL & SURGICAL HISTORY:  Parkinson disease  Other hyperlipidemia  Essential hypertension  No significant past surgical history      ICU Vital Signs Last 24 Hrs  T(C): 36.7 (18 Jul 2020 19:15), Max: 36.7 (18 Jul 2020 16:00)  T(F): 98.1 (18 Jul 2020 19:15), Max: 98.1 (18 Jul 2020 19:15)  HR: 56 (19 Jul 2020 01:30) (55 - 94)  BP: 128/117 (19 Jul 2020 00:00) (101/70 - 166/84)  BP(mean): 122 (19 Jul 2020 00:00) (78 - 153)  ABP: 194/81 (19 Jul 2020 01:30) (123/63 - 203/87)  ABP(mean): 121 (19 Jul 2020 01:30) (18 - 134)  RR: 20 (19 Jul 2020 01:30) (10 - 23)  SpO2: 99% (19 Jul 2020 01:30) (96% - 100%)            07-17 @ 07:01  -  07-18 @ 07:00  --------------------------------------------------------  IN: 904 mL / OUT: 1250 mL / NET: -346 mL            PHYSICAL EXAM:    GEGENERAL: NAD, well-groomed, well-developed  HEAD:  Atraumatic, Normocephalic  EYES: EOMI, PERRLA, conjunctiva and sclera clear  ENMT: No tonsillar erythema, exudates, or enlargement; Moist mucous membranes, Good dentition, No lesions  NECK: Supple, normal appearance, No JVD; Normal thyroid; Trachea midline  NERVOUS SYSTEM:  Alert & Oriented X3, Good concentration; mild tremor noted. no numbness in LUE   CHEST/LUNG: No chest deformity; Normal percussion bilaterally; No rales, rhonchi, wheezing   HEART: Regular rate and rhythm; No murmurs, rubs, or gallops  ABDOMEN: Soft, Nontender, Nondistended; Bowel sounds present  EXTREMITIES:  LUE 4/5 STRENGTH, bLE 5/5.   LYMPH: No lymphadenopathy noted  SKIN: No rashes or lesions; Good capillary refill        LABS:  CBC Full  -  ( 18 Jul 2020 03:42 )  WBC Count : 6.15 K/uL  RBC Count : 5.52 M/uL  Hemoglobin : 15.9 g/dL  Hematocrit : 47.4 %  Platelet Count - Automated : 169 K/uL  Mean Cell Volume : 85.9 fl  Mean Cell Hemoglobin : 28.8 pg  Mean Cell Hemoglobin Concentration : 33.5 gm/dL  Auto Neutrophil # : 4.07 K/uL  Auto Lymphocyte # : 1.40 K/uL  Auto Monocyte # : 0.44 K/uL  Auto Eosinophil # : 0.18 K/uL  Auto Basophil # : 0.05 K/uL  Auto Neutrophil % : 66.1 %  Auto Lymphocyte % : 22.8 %  Auto Monocyte % : 7.2 %  Auto Eosinophil % : 2.9 %  Auto Basophil % : 0.8 %    07-18    140  |  107  |  11  ----------------------------<  107<H>  3.5   |  25  |  0.82    Ca    8.8      18 Jul 2020 03:42  Phos  3.6     07-18  Mg     2.3     07-18    TPro  7.1  /  Alb  3.5  /  TBili  0.8  /  DBili  x   /  AST  15  /  ALT  8<L>  /  AlkPhos  53  07-18    PT/INR - ( 17 Jul 2020 06:53 )   PT: 13.5 sec;   INR: 1.16 ratio                 RADIOLOGY & ADDITIONAL STUDIES REVIEWED:  ***    [ ]GOALS OF CARE DISCUSSION WITH PATIENT/FAMILY/PROXY:    CRITICAL CARE TIME SPENT: 35 minutes INTERVAL HPI/OVERNIGHT EVENTS:     - pt is on and off pheny (0.4mcg/h) depends on BP   - No major event happened overnight started on asp, plavix       Antimicrobial:    Cardiovascular:  phenylephrine    Infusion 0.1 MICROgram(s)/kG/Min IV Continuous <Continuous>    Pulmonary:    Hematalogic:  enoxaparin Injectable 70 milliGRAM(s) SubCutaneous two times a day    Other:  acetaminophen   Tablet .. 650 milliGRAM(s) Oral every 6 hours PRN  atorvastatin 40 milliGRAM(s) Oral at bedtime  benztropine 0.5 milliGRAM(s) Oral at bedtime  carbidopa 48.75 mG/levodopa 195 mG ER 3 Capsule(s) Oral <User Schedule>  chlorhexidine 2% Cloths 1 Application(s) Topical <User Schedule>  clotrimazole 1% Cream 1 Application(s) Topical two times a day  pantoprazole    Tablet 40 milliGRAM(s) Oral before breakfast    acetaminophen   Tablet .. 650 milliGRAM(s) Oral every 6 hours PRN  atorvastatin 40 milliGRAM(s) Oral at bedtime  benztropine 0.5 milliGRAM(s) Oral at bedtime  carbidopa 48.75 mG/levodopa 195 mG ER 3 Capsule(s) Oral <User Schedule>  chlorhexidine 2% Cloths 1 Application(s) Topical <User Schedule>  clotrimazole 1% Cream 1 Application(s) Topical two times a day  enoxaparin Injectable 70 milliGRAM(s) SubCutaneous two times a day  pantoprazole    Tablet 40 milliGRAM(s) Oral before breakfast  phenylephrine    Infusion 0.1 MICROgram(s)/kG/Min IV Continuous <Continuous>    Drug Dosing Weight  Height (cm): 172.72 (17 Jul 2020 08:58)  Weight (kg): 70 (17 Jul 2020 08:58)  BMI (kg/m2): 23.5 (17 Jul 2020 08:58)  BSA (m2): 1.83 (17 Jul 2020 08:58)    PRESSORS: [x ] YES [ ]No  WHICH: pheny    CENTRAL LINE: [] YES [x] NO  LOCATION:   DATE INSERTED:  REMOVE: [] YES [] NO  EXPLAIN:    CHAKRABORTY: [] YES [x] NO    DATE INSERTED:  REMOVE:  [] YES [] NO  EXPLAIN: Strict IOs    A-LINE:  [x] YES [] NO  LOCATION:7/17   DATE INSERTED:  REMOVE:  [] YES [x] NO  EXPLAIN:BP monitoring    PMH -reviewed admission note, no change since admission  PAST MEDICAL & SURGICAL HISTORY:  Parkinson disease  Other hyperlipidemia  Essential hypertension  No significant past surgical history      ICU Vital Signs Last 24 Hrs  T(C): 36.7 (18 Jul 2020 19:15), Max: 36.7 (18 Jul 2020 16:00)  T(F): 98.1 (18 Jul 2020 19:15), Max: 98.1 (18 Jul 2020 19:15)  HR: 56 (19 Jul 2020 01:30) (55 - 94)  BP: 128/117 (19 Jul 2020 00:00) (101/70 - 166/84)  BP(mean): 122 (19 Jul 2020 00:00) (78 - 153)  ABP: 194/81 (19 Jul 2020 01:30) (123/63 - 203/87)  ABP(mean): 121 (19 Jul 2020 01:30) (18 - 134)  RR: 20 (19 Jul 2020 01:30) (10 - 23)  SpO2: 99% (19 Jul 2020 01:30) (96% - 100%)            07-17 @ 07:01  -  07-18 @ 07:00  --------------------------------------------------------  IN: 904 mL / OUT: 1250 mL / NET: -346 mL            PHYSICAL EXAM:    GEGENERAL: NAD, well-groomed, well-developed  HEAD:  Atraumatic, Normocephalic  EYES: EOMI, PERRLA, conjunctiva and sclera clear  ENMT: No tonsillar erythema, exudates, or enlargement; Moist mucous membranes, Good dentition, No lesions  NECK: Supple, normal appearance, No JVD; Normal thyroid; Trachea midline  NERVOUS SYSTEM:  Alert & Oriented X3, Good concentration; mild tremor noted. no numbness in LUE   CHEST/LUNG: No chest deformity; Normal percussion bilaterally; No rales, rhonchi, wheezing   HEART: Regular rate and rhythm; No murmurs, rubs, or gallops  ABDOMEN: Soft, Nontender, Nondistended; Bowel sounds present  EXTREMITIES:  LUE 4/5 STRENGTH, bLE 5/5.   LYMPH: No lymphadenopathy noted  SKIN: No rashes or lesions; Good capillary refill        LABS:  CBC Full  -  ( 18 Jul 2020 03:42 )  WBC Count : 6.15 K/uL  RBC Count : 5.52 M/uL  Hemoglobin : 15.9 g/dL  Hematocrit : 47.4 %  Platelet Count - Automated : 169 K/uL  Mean Cell Volume : 85.9 fl  Mean Cell Hemoglobin : 28.8 pg  Mean Cell Hemoglobin Concentration : 33.5 gm/dL  Auto Neutrophil # : 4.07 K/uL  Auto Lymphocyte # : 1.40 K/uL  Auto Monocyte # : 0.44 K/uL  Auto Eosinophil # : 0.18 K/uL  Auto Basophil # : 0.05 K/uL  Auto Neutrophil % : 66.1 %  Auto Lymphocyte % : 22.8 %  Auto Monocyte % : 7.2 %  Auto Eosinophil % : 2.9 %  Auto Basophil % : 0.8 %    07-18    140  |  107  |  11  ----------------------------<  107<H>  3.5   |  25  |  0.82    Ca    8.8      18 Jul 2020 03:42  Phos  3.6     07-18  Mg     2.3     07-18    TPro  7.1  /  Alb  3.5  /  TBili  0.8  /  DBili  x   /  AST  15  /  ALT  8<L>  /  AlkPhos  53  07-18    PT/INR - ( 17 Jul 2020 06:53 )   PT: 13.5 sec;   INR: 1.16 ratio                 RADIOLOGY & ADDITIONAL STUDIES REVIEWED:  ***    [ ]GOALS OF CARE DISCUSSION WITH PATIENT/FAMILY/PROXY:    CRITICAL CARE TIME SPENT: 35 minutes

## 2020-07-19 NOTE — PHYSICAL THERAPY INITIAL EVALUATION ADULT - GENERAL OBSERVATIONS, REHAB EVAL
Patient was seen for PT evaluation today. EMR, laboratory and radiology results reviewed. Pt received supine in ICU bed, NAD, telemetry, IV lines in situ

## 2020-07-19 NOTE — PROGRESS NOTE ADULT - ASSESSMENT
Discontinue permissive BP regulation; continue anticoagulation. MRI head and MRA head without contrast and MRA Neck with contrast. If MRA shows healing of dissection may be discharged on aspirin and clopidogrel atorvastatin

## 2020-07-19 NOTE — PROGRESS NOTE ADULT - ASSESSMENT
This is a 71 y/o male admitted to medicine for left sided numbness and weakness with CTA head/neck finding of right ICA dissection. Patient is accepted to ICU at this time for strict blood pressure monitoring with goal MAP between 100-140.    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 Dey) advised against transfer of patient; advised medical management  case discussed with Dr. Barrow - admitted  to ICU for strict BP monitoring  goal MAP between 100-140 for adequate cerebral perfusion, on/off phenyleprine  No evidence of dissection on doppler- it is likely that anatomical features of the patient (prominent calcification of neck cartilage) limited a detailed study of every part of the arteries.  c/w lovenox full dose until pt gets MRI/MRA head w/o cont and MRA neck w/ cont  vascular Dr. Shannon   neuro checks q1 hr  MRI head/neck when downgraded    #Parkinson's disease  c/w Rytary  c/w cogentin    CVS:  #right ICA dissection  management as above  permissive hypertension  holding home antihypertensives   restart ASA/Plavix today as per neuro (48hr after it happened)  lipid panel shows cholesterol 136     Pulm:  no active issues  COVID-19 negative x1    GI:  no active issues  diet as tolerated    Renal:  no active issues  renal function wnl    Endo:  no active issues  5.8 HbA1C and TSH 1.87    ID:  no active issues    Prophylaxis:  full dose lovenox as advised by neuro  GI ppx with protonix    DISPO:  Monitor in ICU for close hemodynamic monitoring and frequent neuro checks. This is a 69 y/o male admitted to medicine for left sided numbness and weakness with CTA head/neck finding of right ICA dissection. Patient is accepted to ICU at this time for strict blood pressure monitoring with goal MAP between 100-140.    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 Dey) advised against transfer of patient; advised medical management  case discussed with Dr. Barrow - admitted  to ICU for strict BP monitoring  goal MAP between 100-140 for adequate cerebral perfusion, on/off phenyleprine  No evidence of dissection on doppler- it is likely that anatomical features of the patient (prominent calcification of neck cartilage) limited a detailed study of every part of the arteries.  c/w lovenox full dose until pt gets MRI/MRA head w/o cont and MRA neck w/ cont; started on asa, plavix 7/19 - will continue to monitor in ICU for 24 hours   vascular Dr. Shannon   neuro checks per routine  MRI head/neck when downgraded    #Parkinson's disease  c/w Rytary  c/w cogentin    CVS:  #right ICA dissection  management as above  permissive hypertension  restarted ASA/Plavix today as per neuro (48hr after it happened)  lipid panel shows cholesterol 136     Pulm:  no active issues  COVID-19 negative x1    GI:  no active issues  diet as tolerated    Renal:  no active issues  renal function wnl    Endo:  no active issues  5.8 HbA1C and TSH 1.87    ID:  no active issues    Prophylaxis:  full dose lovenox as advised by neuro  GI ppx with protonix    DISPO:  Monitor in ICU for close hemodynamic monitoring and neuro checks.

## 2020-07-19 NOTE — PHYSICAL THERAPY INITIAL EVALUATION ADULT - DIAGNOSIS, PT EVAL
Difficulty with bed mobility, transfers and ambulation due to generalized weakness with L side weaker than Right side

## 2020-07-20 LAB
ALBUMIN SERPL ELPH-MCNC: 2.8 G/DL — LOW (ref 3.5–5)
ALP SERPL-CCNC: 41 U/L — SIGNIFICANT CHANGE UP (ref 40–120)
ALT FLD-CCNC: 12 U/L DA — SIGNIFICANT CHANGE UP (ref 10–60)
ANION GAP SERPL CALC-SCNC: 8 MMOL/L — SIGNIFICANT CHANGE UP (ref 5–17)
AST SERPL-CCNC: 43 U/L — HIGH (ref 10–40)
BASOPHILS # BLD AUTO: 0.02 K/UL — SIGNIFICANT CHANGE UP (ref 0–0.2)
BASOPHILS NFR BLD AUTO: 0.6 % — SIGNIFICANT CHANGE UP (ref 0–2)
BILIRUB SERPL-MCNC: 0.4 MG/DL — SIGNIFICANT CHANGE UP (ref 0.2–1.2)
BUN SERPL-MCNC: 10 MG/DL — SIGNIFICANT CHANGE UP (ref 7–18)
CALCIUM SERPL-MCNC: 8.3 MG/DL — LOW (ref 8.4–10.5)
CHLORIDE SERPL-SCNC: 107 MMOL/L — SIGNIFICANT CHANGE UP (ref 96–108)
CO2 SERPL-SCNC: 25 MMOL/L — SIGNIFICANT CHANGE UP (ref 22–31)
CREAT SERPL-MCNC: 0.68 MG/DL — SIGNIFICANT CHANGE UP (ref 0.5–1.3)
EOSINOPHIL # BLD AUTO: 0.05 K/UL — SIGNIFICANT CHANGE UP (ref 0–0.5)
EOSINOPHIL NFR BLD AUTO: 1.6 % — SIGNIFICANT CHANGE UP (ref 0–6)
GLUCOSE SERPL-MCNC: 86 MG/DL — SIGNIFICANT CHANGE UP (ref 70–99)
HCT VFR BLD CALC: 40.5 % — SIGNIFICANT CHANGE UP (ref 39–50)
HGB BLD-MCNC: 13.6 G/DL — SIGNIFICANT CHANGE UP (ref 13–17)
IMM GRANULOCYTES NFR BLD AUTO: 0.6 % — SIGNIFICANT CHANGE UP (ref 0–1.5)
LYMPHOCYTES # BLD AUTO: 0.44 K/UL — LOW (ref 1–3.3)
LYMPHOCYTES # BLD AUTO: 13.7 % — SIGNIFICANT CHANGE UP (ref 13–44)
MAGNESIUM SERPL-MCNC: 2.2 MG/DL — SIGNIFICANT CHANGE UP (ref 1.6–2.6)
MCHC RBC-ENTMCNC: 29.1 PG — SIGNIFICANT CHANGE UP (ref 27–34)
MCHC RBC-ENTMCNC: 33.6 GM/DL — SIGNIFICANT CHANGE UP (ref 32–36)
MCV RBC AUTO: 86.7 FL — SIGNIFICANT CHANGE UP (ref 80–100)
MONOCYTES # BLD AUTO: 0.28 K/UL — SIGNIFICANT CHANGE UP (ref 0–0.9)
MONOCYTES NFR BLD AUTO: 8.7 % — SIGNIFICANT CHANGE UP (ref 2–14)
NEUTROPHILS # BLD AUTO: 2.41 K/UL — SIGNIFICANT CHANGE UP (ref 1.8–7.4)
NEUTROPHILS NFR BLD AUTO: 74.8 % — SIGNIFICANT CHANGE UP (ref 43–77)
NRBC # BLD: 0 /100 WBCS — SIGNIFICANT CHANGE UP (ref 0–0)
PHOSPHATE SERPL-MCNC: 3.2 MG/DL — SIGNIFICANT CHANGE UP (ref 2.5–4.5)
PLATELET # BLD AUTO: 121 K/UL — LOW (ref 150–400)
POTASSIUM SERPL-MCNC: 3.9 MMOL/L — SIGNIFICANT CHANGE UP (ref 3.5–5.3)
POTASSIUM SERPL-SCNC: 3.9 MMOL/L — SIGNIFICANT CHANGE UP (ref 3.5–5.3)
PROT SERPL-MCNC: 6 G/DL — SIGNIFICANT CHANGE UP (ref 6–8.3)
RBC # BLD: 4.67 M/UL — SIGNIFICANT CHANGE UP (ref 4.2–5.8)
RBC # FLD: 12.5 % — SIGNIFICANT CHANGE UP (ref 10.3–14.5)
SODIUM SERPL-SCNC: 140 MMOL/L — SIGNIFICANT CHANGE UP (ref 135–145)
WBC # BLD: 3.22 K/UL — LOW (ref 3.8–10.5)
WBC # FLD AUTO: 3.22 K/UL — LOW (ref 3.8–10.5)

## 2020-07-20 PROCEDURE — 70551 MRI BRAIN STEM W/O DYE: CPT | Mod: 26

## 2020-07-20 PROCEDURE — 70544 MR ANGIOGRAPHY HEAD W/O DYE: CPT | Mod: 26,59

## 2020-07-20 PROCEDURE — 70548 MR ANGIOGRAPHY NECK W/DYE: CPT | Mod: 26

## 2020-07-20 RX ORDER — POLYETHYLENE GLYCOL 3350 17 G/17G
17 POWDER, FOR SOLUTION ORAL DAILY
Refills: 0 | Status: DISCONTINUED | OUTPATIENT
Start: 2020-07-20 | End: 2020-07-22

## 2020-07-20 RX ADMIN — PANTOPRAZOLE SODIUM 40 MILLIGRAM(S): 20 TABLET, DELAYED RELEASE ORAL at 06:09

## 2020-07-20 RX ADMIN — CLOPIDOGREL BISULFATE 75 MILLIGRAM(S): 75 TABLET, FILM COATED ORAL at 13:06

## 2020-07-20 RX ADMIN — CARBIDOPA AND LEVODOPA 3 CAPSULE(S): 25; 100 TABLET ORAL at 13:06

## 2020-07-20 RX ADMIN — Medication 650 MILLIGRAM(S): at 06:12

## 2020-07-20 RX ADMIN — ENOXAPARIN SODIUM 70 MILLIGRAM(S): 100 INJECTION SUBCUTANEOUS at 17:10

## 2020-07-20 RX ADMIN — POLYETHYLENE GLYCOL 3350 17 GRAM(S): 17 POWDER, FOR SOLUTION ORAL at 13:57

## 2020-07-20 RX ADMIN — SODIUM CHLORIDE 100 MILLILITER(S): 9 INJECTION INTRAMUSCULAR; INTRAVENOUS; SUBCUTANEOUS at 06:28

## 2020-07-20 RX ADMIN — CARBIDOPA AND LEVODOPA 3 CAPSULE(S): 25; 100 TABLET ORAL at 17:09

## 2020-07-20 RX ADMIN — CHLORHEXIDINE GLUCONATE 1 APPLICATION(S): 213 SOLUTION TOPICAL at 06:08

## 2020-07-20 RX ADMIN — Medication 650 MILLIGRAM(S): at 18:18

## 2020-07-20 RX ADMIN — Medication 1 APPLICATION(S): at 06:09

## 2020-07-20 RX ADMIN — CARBIDOPA AND LEVODOPA 3 CAPSULE(S): 25; 100 TABLET ORAL at 06:08

## 2020-07-20 RX ADMIN — Medication 81 MILLIGRAM(S): at 13:06

## 2020-07-20 RX ADMIN — Medication 650 MILLIGRAM(S): at 06:50

## 2020-07-20 RX ADMIN — ENOXAPARIN SODIUM 70 MILLIGRAM(S): 100 INJECTION SUBCUTANEOUS at 06:06

## 2020-07-20 RX ADMIN — Medication 650 MILLIGRAM(S): at 17:14

## 2020-07-20 NOTE — PROGRESS NOTE ADULT - SUBJECTIVE AND OBJECTIVE BOX
INTERVAL HPI/OVERNIGHT EVENTS: ***    PRESSORS: [ ] YES [ ] NO  WHICH:    ANTIBIOTICS:                  DATE STARTED:  ANTIBIOTICS:                  DATE STARTED:  ANTIBIOTICS:                  DATE STARTED:    Antimicrobial:    Cardiovascular:  losartan 25 milliGRAM(s) Oral daily    Pulmonary:    Hematalogic:  aspirin  chewable 81 milliGRAM(s) Oral daily  clopidogrel Tablet 75 milliGRAM(s) Oral daily  enoxaparin Injectable 70 milliGRAM(s) SubCutaneous two times a day    Other:  acetaminophen   Tablet .. 650 milliGRAM(s) Oral every 6 hours PRN  atorvastatin 40 milliGRAM(s) Oral at bedtime  benztropine 0.5 milliGRAM(s) Oral at bedtime  carbidopa 48.75 mG/levodopa 195 mG ER 3 Capsule(s) Oral <User Schedule>  chlorhexidine 2% Cloths 1 Application(s) Topical <User Schedule>  clotrimazole 1% Cream 1 Application(s) Topical two times a day  pantoprazole    Tablet 40 milliGRAM(s) Oral before breakfast  senna 2 Tablet(s) Oral at bedtime    acetaminophen   Tablet .. 650 milliGRAM(s) Oral every 6 hours PRN  aspirin  chewable 81 milliGRAM(s) Oral daily  atorvastatin 40 milliGRAM(s) Oral at bedtime  benztropine 0.5 milliGRAM(s) Oral at bedtime  carbidopa 48.75 mG/levodopa 195 mG ER 3 Capsule(s) Oral <User Schedule>  chlorhexidine 2% Cloths 1 Application(s) Topical <User Schedule>  clopidogrel Tablet 75 milliGRAM(s) Oral daily  clotrimazole 1% Cream 1 Application(s) Topical two times a day  enoxaparin Injectable 70 milliGRAM(s) SubCutaneous two times a day  losartan 25 milliGRAM(s) Oral daily  pantoprazole    Tablet 40 milliGRAM(s) Oral before breakfast  senna 2 Tablet(s) Oral at bedtime    Drug Dosing Weight  Height (cm): 172.72 (17 Jul 2020 08:58)  Weight (kg): 70 (17 Jul 2020 08:58)  BMI (kg/m2): 23.5 (17 Jul 2020 08:58)  BSA (m2): 1.83 (17 Jul 2020 08:58)    CENTRAL LINE: [ ] YES [ ] NO  LOCATION:   DATE INSERTED:  REMOVE: [ ] YES [ ] NO  EXPLAIN:    CHAKRABORTY: [ ] YES [ ] NO    DATE INSERTED:  REMOVE:  [ ] YES [ ] NO  EXPLAIN:    A-LINE:  [ ] YES [ ] NO  LOCATION:   DATE INSERTED:  REMOVE:  [ ] YES [ ] NO  EXPLAIN:    ICU Vital Signs Last 24 Hrs  T(C): 36.8 (20 Jul 2020 04:30), Max: 36.8 (20 Jul 2020 04:30)  T(F): 98.2 (20 Jul 2020 04:30), Max: 98.2 (20 Jul 2020 04:30)  HR: 62 (20 Jul 2020 11:00) (47 - 110)  BP: 126/58 (20 Jul 2020 08:00) (89/48 - 127/50)  BP(mean): 77 (20 Jul 2020 08:00) (60 - 92)  ABP: 132/61 (20 Jul 2020 11:00) (75/39 - 172/76)  ABP(mean): 86 (20 Jul 2020 11:00) (52 - 111)  RR: 16 (20 Jul 2020 11:00) (12 - 27)  SpO2: 99% (20 Jul 2020 11:00) (95% - 100%)            07-19 @ 07:01  -  07-20 @ 07:00  --------------------------------------------------------  IN: 2057 mL / OUT: 1025 mL / NET: 1032 mL            >>> <<<    PHYSICAL EXAM:    GENERAL: NAD, well-groomed, well-developed  HEAD:  Atraumatic, Normocephalic  EYES: EOMI, PERRLA, conjunctiva and sclera clear  ENMT: No tonsillar erythema, exudates, or enlargement; Moist mucous membranes, Good dentition, No lesions  NECK: Supple, normal appearance, No JVD; Normal thyroid; Trachea midline  NERVOUS SYSTEM:  Alert & Oriented X3, Good concentration; Motor Strength 5/5 B/L upper and lower extremities; DTRs 2+ intact and symmetric  CHEST/LUNG: No chest deformity; Normal percussion bilaterally; No rales, rhonchi, wheezing   HEART: Regular rate and rhythm; No murmurs, rubs, or gallops  ABDOMEN: Soft, Nontender, Nondistended; Bowel sounds present  EXTREMITIES:  2+ Peripheral Pulses, No clubbing, cyanosis, or edema  LYMPH: No lymphadenopathy noted  SKIN: No rashes or lesions; Good capillary refill      LABS:  CBC Full  -  ( 20 Jul 2020 06:21 )  WBC Count : 3.22 K/uL  RBC Count : 4.67 M/uL  Hemoglobin : 13.6 g/dL  Hematocrit : 40.5 %  Platelet Count - Automated : 121 K/uL  Mean Cell Volume : 86.7 fl  Mean Cell Hemoglobin : 29.1 pg  Mean Cell Hemoglobin Concentration : 33.6 gm/dL  Auto Neutrophil # : 2.41 K/uL  Auto Lymphocyte # : 0.44 K/uL  Auto Monocyte # : 0.28 K/uL  Auto Eosinophil # : 0.05 K/uL  Auto Basophil # : 0.02 K/uL  Auto Neutrophil % : 74.8 %  Auto Lymphocyte % : 13.7 %  Auto Monocyte % : 8.7 %  Auto Eosinophil % : 1.6 %  Auto Basophil % : 0.6 %    07-20    140  |  107  |  10  ----------------------------<  86  3.9   |  25  |  0.68    Ca    8.3<L>      20 Jul 2020 06:21  Phos  3.2     07-20  Mg     2.2     07-20    TPro  6.0  /  Alb  2.8<L>  /  TBili  0.4  /  DBili  x   /  AST  43<H>  /  ALT  12  /  AlkPhos  41  07-20            RADIOLOGY & ADDITIONAL STUDIES REVIEWED:  ***    [ ]GOALS OF CARE DISCUSSION WITH PATIENT/FAMILY/PROXY:    CRITICAL CARE TIME SPENT: 35 minutes INTERVAL HPI/OVERNIGHT EVENTS: BP remained stable and patient is ready for DG     PRESSORS: [ ] YES [x ] NO  WHICH:    Cardiovascular:  losartan 25 milliGRAM(s) Oral daily    Pulmonary:    Hematalogic:  aspirin  chewable 81 milliGRAM(s) Oral daily  clopidogrel Tablet 75 milliGRAM(s) Oral daily  enoxaparin Injectable 70 milliGRAM(s) SubCutaneous two times a day    Other:  acetaminophen   Tablet .. 650 milliGRAM(s) Oral every 6 hours PRN  atorvastatin 40 milliGRAM(s) Oral at bedtime  benztropine 0.5 milliGRAM(s) Oral at bedtime  carbidopa 48.75 mG/levodopa 195 mG ER 3 Capsule(s) Oral <User Schedule>  chlorhexidine 2% Cloths 1 Application(s) Topical <User Schedule>  clotrimazole 1% Cream 1 Application(s) Topical two times a day  pantoprazole    Tablet 40 milliGRAM(s) Oral before breakfast  senna 2 Tablet(s) Oral at bedtime    acetaminophen   Tablet .. 650 milliGRAM(s) Oral every 6 hours PRN  aspirin  chewable 81 milliGRAM(s) Oral daily  atorvastatin 40 milliGRAM(s) Oral at bedtime  benztropine 0.5 milliGRAM(s) Oral at bedtime  carbidopa 48.75 mG/levodopa 195 mG ER 3 Capsule(s) Oral <User Schedule>  chlorhexidine 2% Cloths 1 Application(s) Topical <User Schedule>  clopidogrel Tablet 75 milliGRAM(s) Oral daily  clotrimazole 1% Cream 1 Application(s) Topical two times a day  enoxaparin Injectable 70 milliGRAM(s) SubCutaneous two times a day  losartan 25 milliGRAM(s) Oral daily  pantoprazole    Tablet 40 milliGRAM(s) Oral before breakfast  senna 2 Tablet(s) Oral at bedtime    Drug Dosing Weight  Height (cm): 172.72 (17 Jul 2020 08:58)  Weight (kg): 70 (17 Jul 2020 08:58)  BMI (kg/m2): 23.5 (17 Jul 2020 08:58)  BSA (m2): 1.83 (17 Jul 2020 08:58)    CENTRAL LINE: [ ] YES [x ] NO  LOCATION:   DATE INSERTED:  REMOVE: [ ] YES [ ] NO  EXPLAIN:    CHAKRABORTY: [ ] YES [x ] NO    DATE INSERTED:  REMOVE:  [ ] YES [ ] NO  EXPLAIN:    A-LINE:  [ ] YES [x ] NO  LOCATION:   DATE INSERTED:  REMOVE:  [ ] YES [ ] NO  EXPLAIN:    ICU Vital Signs Last 24 Hrs  T(C): 36.8 (20 Jul 2020 04:30), Max: 36.8 (20 Jul 2020 04:30)  T(F): 98.2 (20 Jul 2020 04:30), Max: 98.2 (20 Jul 2020 04:30)  HR: 62 (20 Jul 2020 11:00) (47 - 110)  BP: 126/58 (20 Jul 2020 08:00) (89/48 - 127/50)  BP(mean): 77 (20 Jul 2020 08:00) (60 - 92)  ABP: 132/61 (20 Jul 2020 11:00) (75/39 - 172/76)  ABP(mean): 86 (20 Jul 2020 11:00) (52 - 111)  RR: 16 (20 Jul 2020 11:00) (12 - 27)  SpO2: 99% (20 Jul 2020 11:00) (95% - 100%)            07-19 @ 07:01  -  07-20 @ 07:00  --------------------------------------------------------  IN: 2057 mL / OUT: 1025 mL / NET: 1032 mL            PHYSICAL EXAM:    GENERAL: NAD, well-groomed, well-developed  HEAD:  Atraumatic, Normocephalic  EYES: EOMI, PERRLA, conjunctiva and sclera clear  ENMT: No tonsillar erythema, exudates, or enlargement; Moist mucous membranes, Good dentition, No lesions  NECK: Supple, normal appearance, No JVD; Normal thyroid; Trachea midline  NERVOUS SYSTEM:  Alert & Oriented X3, Good concentration; Motor Strength 5/5 B/L upper and lower extremities; + tremor bl   CHEST/LUNG: No chest deformity; Normal percussion bilaterally; No rales, rhonchi, wheezing   HEART: Regular rate and rhythm; No murmurs, rubs, or gallops  ABDOMEN: Soft, Nontender, Nondistended; Bowel sounds present  EXTREMITIES:  2+ Peripheral Pulses, No clubbing, cyanosis, or edema  LYMPH: No lymphadenopathy noted  SKIN: No rashes or lesions; Good capillary refill      LABS:  CBC Full  -  ( 20 Jul 2020 06:21 )  WBC Count : 3.22 K/uL  RBC Count : 4.67 M/uL  Hemoglobin : 13.6 g/dL  Hematocrit : 40.5 %  Platelet Count - Automated : 121 K/uL  Mean Cell Volume : 86.7 fl  Mean Cell Hemoglobin : 29.1 pg  Mean Cell Hemoglobin Concentration : 33.6 gm/dL  Auto Neutrophil # : 2.41 K/uL  Auto Lymphocyte # : 0.44 K/uL  Auto Monocyte # : 0.28 K/uL  Auto Eosinophil # : 0.05 K/uL  Auto Basophil # : 0.02 K/uL  Auto Neutrophil % : 74.8 %  Auto Lymphocyte % : 13.7 %  Auto Monocyte % : 8.7 %  Auto Eosinophil % : 1.6 %  Auto Basophil % : 0.6 %    07-20    140  |  107  |  10  ----------------------------<  86  3.9   |  25  |  0.68    Ca    8.3<L>      20 Jul 2020 06:21  Phos  3.2     07-20  Mg     2.2     07-20    TPro  6.0  /  Alb  2.8<L>  /  TBili  0.4  /  DBili  x   /  AST  43<H>  /  ALT  12  /  AlkPhos  41  07-20            RADIOLOGY & ADDITIONAL STUDIES REVIEWED:  ***    [ ]GOALS OF CARE DISCUSSION WITH PATIENT/FAMILY/PROXY:    CRITICAL CARE TIME SPENT: 35 minutes

## 2020-07-20 NOTE — CHART NOTE - NSCHARTNOTEFT_GEN_A_CORE
71 y/o male with PMH of HTN, HLD and Parkinson disease come to ED s with numbness to the left arm that started yesterday around 8pm. Patient thought it would get better but it did not so he came to the ED. Pt denies any recent trauma to neck. Pt denies any upper or lower extremity weakness, slurring speech, confusion blurred vison, chest pain, palpitations, fever, cough or any other acute complaints.   In ED, /65, HR 53     ICU consulted given CTA head/neck findings of possible right cervical ICA dissection. ED provider discussed case with neurosurgery, who advised against transfer and opted for medical management. Case discussed by MAR with neurologist Dr. Barrow, who recommended patient be monitored in ICU at this time for strict blood pressure monitoring, with goal MAP between 100-140mmHg. Patient seen and examined by myself in ED. Noted to be mildly irritable at this time and complains of generalized body pain as well as left sided weakness and headache. Denies neck pain or dizziness. Denies slurring of speech.    patient remains symptomatic at this time w/ left UE weakness and numbness  neurosurgery (Dr. Dey) advised against transfer of patient; advised medical management  case discussed with Dr. Barrow - admitted  to ICU for strict BP monitoring  goal MAP between 100-140 for adequate cerebral perfusion, on/off phenyleprine  No evidence of dissection on doppler- it is likely that anatomical features of the patient (prominent calcification of neck cartilage) limited a detailed study of every part of the arteries.  c/w lovenox full dose until pt gets MRI/MRA head w/o cont and MRA neck w/ cont; started on asa, plavix 7/19 - will continue to monitor in ICU for 24 hours   vascular Dr. Shannon   neuro checks per routine  MRI head/neck when downgraded 71 y/o male with PMH of HTN, HLD and Parkinson disease come to ED s with numbness to the left arm that started yesterday around 8pm. Patient thought it would get better but it did not so he came to the ED. Pt denies any recent trauma to neck. Pt denies any upper or lower extremity weakness, slurring speech, confusion blurred vison, chest pain, palpitations, fever, cough or any other acute complaints.   In ED, /65, HR 53     ICU consulted given CTA head/neck findings of possible right cervical ICA dissection. ED provider discussed case with neurosurgery, who advised against transfer and opted for medical management. Case discussed by MAR with neurologist Dr. Barrow, who recommended patient be monitored in ICU at this time for strict blood pressure monitoring, with goal MAP between 100-140mmHg. Patient seen and examined by myself in ED. Noted to be mildly irritable at this time and complains of generalized body pain as well as left sided weakness and headache. Denies neck pain or dizziness. Denies slurring of speech.    patient was on phenylephrine to maintain MAP > 100. Carotid dopplers were ordered but No evidence of dissection on doppler- it is likely that anatomical features of the patient (prominent calcification of neck cartilage) limited a detailed study of every part of the arteries. Patient was started on lovenox full dose until pt gets MRI/MRA head w/o cont and MRA neck w/ cont as recommended  by neurologist; 48 hours later pt was started on asa, plavix 7/19.     Things to follow:   MRI head/neck when downgraded  restarted BP medications when blood pressure is stable. currently on the soft side.     Patient is stable for downgrade to floor under care of Dr. Oneil for further management , covering resident PGY1 Tanjaida- was informed.

## 2020-07-20 NOTE — PROGRESS NOTE ADULT - SUBJECTIVE AND OBJECTIVE BOX
Patient is a 70y old  Male who presents with a chief complaint of Stroke (19 Jul 2020 13:57)    pt seen in icu [x  ], reg med floor [   ], bed [ x ], chair at bedside [   ], a+o x3 [ x ], lethargic [  ],    nad [ x ]      Allergies    No Known Allergies        Vitals    T(F): 98.2 (07-20-20 @ 04:30), Max: 98.8 (07-19-20 @ 07:00)  HR: 67 (07-20-20 @ 06:00) (47 - 110)  BP: 127/50 (07-20-20 @ 06:00) (89/48 - 147/69)  RR: 19 (07-20-20 @ 06:00) (12 - 27)  SpO2: 100% (07-20-20 @ 06:00) (92% - 100%)  Wt(kg): --  CAPILLARY BLOOD GLUCOSE          Labs                          15.8   8.12  )-----------( 162      ( 19 Jul 2020 06:46 )             46.4       07-19    140  |  106  |  8   ----------------------------<  90  3.8   |  27  |  0.71    Ca    8.7      19 Jul 2020 06:46  Phos  2.7     07-19  Mg     2.1     07-19    TPro  7.1  /  Alb  3.4<L>  /  TBili  1.0  /  DBili  x   /  AST  21  /  ALT  9<L>  /  AlkPhos  50  07-19                Radiology Results      Meds    MEDICATIONS  (STANDING):  aspirin  chewable 81 milliGRAM(s) Oral daily  atorvastatin 40 milliGRAM(s) Oral at bedtime  benztropine 0.5 milliGRAM(s) Oral at bedtime  carbidopa 48.75 mG/levodopa 195 mG ER 3 Capsule(s) Oral <User Schedule>  chlorhexidine 2% Cloths 1 Application(s) Topical <User Schedule>  clopidogrel Tablet 75 milliGRAM(s) Oral daily  clotrimazole 1% Cream 1 Application(s) Topical two times a day  enoxaparin Injectable 70 milliGRAM(s) SubCutaneous two times a day  losartan 25 milliGRAM(s) Oral daily  pantoprazole    Tablet 40 milliGRAM(s) Oral before breakfast  senna 2 Tablet(s) Oral at bedtime  sodium chloride 0.9%. 1000 milliLiter(s) (100 mL/Hr) IV Continuous <Continuous>      MEDICATIONS  (PRN):  acetaminophen   Tablet .. 650 milliGRAM(s) Oral every 6 hours PRN Temp greater or equal to 38C (100.4F), Mild Pain (1 - 3), Moderate Pain (4 - 6)      Physical Exam    Neuro :  no focal deficits  Respiratory: CTA B/L  CV: RRR, S1S2, no murmurs,   Abdominal: Soft, NT, ND +BS,  Extremities: No edema, + peripheral pulses    ASSESSMENT    right Carotid dissection SHIREEN with stroke  h/o Parkinson disease  Other hyperlipidemia  Essential hypertension        PLAN    cont icu monitoring  neuro cons noted   No IV tpa given because beyond time window and non-disabling deficits  Lovenox initially full anticoagulation protocol  cont ASA and PLavix   cont Lovenox  Maintain permissive BP: parameters -130. Low pulse pressure  Statin  Dysphagia screen  f/u MRI/A brain  f/u MRA carotids after 72 h   Plan continue permissive BOP and full anticoagulation for a further 24 hours.   If stable he may discontinue anticoagulation after MRI Head and MRA head wo contrast  and MRA neck neck with contrast.   Discussed with his primary neurologist Dr Veliz and explained that Dr Rodriguez neurovascular surgeon had been contacted in [place] who had advised conservative management.   He agrees with anticogulation and current plans  vascular cons noted   no vascular surgery intervention at this time  a-line placed on 7/17  holding home antihypertensives   GI ppx with protonix  COVID-19 negative x1   cont current meds  cont mgmt as per icu Patient is a 70y old  Male who presents with a chief complaint of Stroke (19 Jul 2020 13:57)    pt seen in icu [x  ], reg med floor [   ], bed [ x ], chair at bedside [   ], a+o x3 [ x ], lethargic [  ],    nad [ x ]      Allergies    No Known Allergies        Vitals    T(F): 98.2 (07-20-20 @ 04:30), Max: 98.8 (07-19-20 @ 07:00)  HR: 67 (07-20-20 @ 06:00) (47 - 110)  BP: 127/50 (07-20-20 @ 06:00) (89/48 - 147/69)  RR: 19 (07-20-20 @ 06:00) (12 - 27)  SpO2: 100% (07-20-20 @ 06:00) (92% - 100%)  Wt(kg): --  CAPILLARY BLOOD GLUCOSE          Labs                          15.8   8.12  )-----------( 162      ( 19 Jul 2020 06:46 )             46.4       07-19    140  |  106  |  8   ----------------------------<  90  3.8   |  27  |  0.71    Ca    8.7      19 Jul 2020 06:46  Phos  2.7     07-19  Mg     2.1     07-19    TPro  7.1  /  Alb  3.4<L>  /  TBili  1.0  /  DBili  x   /  AST  21  /  ALT  9<L>  /  AlkPhos  50  07-19                Radiology Results      Meds    MEDICATIONS  (STANDING):  aspirin  chewable 81 milliGRAM(s) Oral daily  atorvastatin 40 milliGRAM(s) Oral at bedtime  benztropine 0.5 milliGRAM(s) Oral at bedtime  carbidopa 48.75 mG/levodopa 195 mG ER 3 Capsule(s) Oral <User Schedule>  chlorhexidine 2% Cloths 1 Application(s) Topical <User Schedule>  clopidogrel Tablet 75 milliGRAM(s) Oral daily  clotrimazole 1% Cream 1 Application(s) Topical two times a day  enoxaparin Injectable 70 milliGRAM(s) SubCutaneous two times a day  losartan 25 milliGRAM(s) Oral daily  pantoprazole    Tablet 40 milliGRAM(s) Oral before breakfast  senna 2 Tablet(s) Oral at bedtime  sodium chloride 0.9%. 1000 milliLiter(s) (100 mL/Hr) IV Continuous <Continuous>      MEDICATIONS  (PRN):  acetaminophen   Tablet .. 650 milliGRAM(s) Oral every 6 hours PRN Temp greater or equal to 38C (100.4F), Mild Pain (1 - 3), Moderate Pain (4 - 6)      Physical Exam    Neuro :  no focal deficits  Respiratory: CTA B/L  CV: RRR, S1S2, no murmurs,   Abdominal: Soft, NT, ND +BS,  Extremities: No edema, + peripheral pulses    ASSESSMENT    right Carotid dissection SHIREEN with stroke  h/o Parkinson disease  Other hyperlipidemia  Essential hypertension        PLAN    cont icu monitoring  No IV tpa given because beyond time window and non-disabling deficits  cont ASA and PLavix   Statin  neuro f/u noted  Discontinue permissive BP regulation;   continue anticoagulation.   f/u MRI head and MRA head without contrast and MRA Neck with contrast.   If MRA shows healing of dissection may be discharged on aspirin and clopidogrel atorvastatin  If stable he may discontinue anticoagulation after MRI Head and MRA head wo contrast  and MRA neck neck with contrast.   Discussed with his primary neurologist Dr Veliz and explained that Dr Rodriguez neurovascular surgeon had been contacted in [place] who had advised conservative management.   vascular cons noted   no vascular surgery intervention at this time  a-line placed on 7/17  holding home antihypertensives   GI ppx with protonix  COVID-19 negative x1   cont current meds  cont mgmt as per icu

## 2020-07-20 NOTE — PROGRESS NOTE ADULT - ASSESSMENT
This is a 69 y/o male admitted to medicine for left sided numbness and weakness with CTA head/neck finding of right ICA dissection. Patient is accepted to ICU at this time for strict blood pressure monitoring with goal MAP between 100-140.    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 Dey) advised against transfer of patient; advised medical management  case discussed with Dr. Barrow - admitted  to ICU for strict BP monitoring  goal MAP between 100-140 for adequate cerebral perfusion, on/off phenyleprine  No evidence of dissection on doppler- it is likely that anatomical features of the patient (prominent calcification of neck cartilage) limited a detailed study of every part of the arteries.  c/w lovenox full dose until pt gets MRI/MRA head w/o cont and MRA neck w/ cont; started on asa, plavix 7/19 -pt is stable for DG for MRI today   vascular Dr. Shannon   neuro checks per routine  MRI head/neck when downgraded    #Parkinson's disease  c/w Rytary  c/w cogentin    CVS:  #right ICA dissection  restarted ASA/Plavix   lipid panel shows cholesterol 136     Pulm:  no active issues  COVID-19 negative x1    GI:  no active issues  diet as tolerated    Renal:  no active issues  renal function wnl    Endo:  no active issues  5.8 HbA1C and TSH 1.87    ID:  no active issues    Prophylaxis:  full dose lovenox as advised by neuro  GI ppx with protonix    DISPO:  DG to general floors This is a 71 y/o male admitted to medicine for left sided numbness and weakness with CTA head/neck finding of right ICA dissection. Patient is accepted to ICU at this time for strict blood pressure monitoring with goal MAP between 100-140.    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 Dey) advised against transfer of patient; advised medical management  case discussed with Dr. Barrow - admitted  to ICU for strict BP monitoring  goal MAP between 100-140 for adequate cerebral perfusion, on/off phenyleprine  No evidence of dissection on doppler- it is likely that anatomical features of the patient (prominent calcification of neck cartilage) limited a detailed study of every part of the arteries.  c/w lovenox full dose until pt gets MRI/MRA head w/o cont and MRA neck w/ cont; started on asa, plavix 7/19 -pt is stable for DG for MRI today   vascular Dr. Shannon   neuro checks per routine  MRI head/neck when downgraded    #Parkinson's disease  c/w Rytary  c/w cogentin    CVS:  #right ICA dissection  restarted ASA/Plavix   lipid panel shows cholesterol 136     Pulm:  no active issues  COVID-19 negative x1    GI:  no active issues  diet as tolerated    Renal:  no active issues  renal function wnl    Endo:  no active issues  5.8 HbA1C and TSH 1.87    ID:  no active issues    Prophylaxis:  full dose lovenox as advised by neuro    DISPO:  DG to general floors

## 2020-07-21 DIAGNOSIS — I63.89 OTHER CEREBRAL INFARCTION: ICD-10-CM

## 2020-07-21 LAB
ANION GAP SERPL CALC-SCNC: 9 MMOL/L — SIGNIFICANT CHANGE UP (ref 5–17)
BUN SERPL-MCNC: 7 MG/DL — SIGNIFICANT CHANGE UP (ref 7–18)
CALCIUM SERPL-MCNC: 8.9 MG/DL — SIGNIFICANT CHANGE UP (ref 8.4–10.5)
CHLORIDE SERPL-SCNC: 105 MMOL/L — SIGNIFICANT CHANGE UP (ref 96–108)
CO2 SERPL-SCNC: 28 MMOL/L — SIGNIFICANT CHANGE UP (ref 22–31)
CREAT SERPL-MCNC: 0.85 MG/DL — SIGNIFICANT CHANGE UP (ref 0.5–1.3)
GLUCOSE BLDC GLUCOMTR-MCNC: 112 MG/DL — HIGH (ref 70–99)
GLUCOSE BLDC GLUCOMTR-MCNC: 91 MG/DL — SIGNIFICANT CHANGE UP (ref 70–99)
GLUCOSE SERPL-MCNC: 96 MG/DL — SIGNIFICANT CHANGE UP (ref 70–99)
HCT VFR BLD CALC: 43.8 % — SIGNIFICANT CHANGE UP (ref 39–50)
HGB BLD-MCNC: 14.6 G/DL — SIGNIFICANT CHANGE UP (ref 13–17)
MAGNESIUM SERPL-MCNC: 2.4 MG/DL — SIGNIFICANT CHANGE UP (ref 1.6–2.6)
MCHC RBC-ENTMCNC: 29.4 PG — SIGNIFICANT CHANGE UP (ref 27–34)
MCHC RBC-ENTMCNC: 33.3 GM/DL — SIGNIFICANT CHANGE UP (ref 32–36)
MCV RBC AUTO: 88.1 FL — SIGNIFICANT CHANGE UP (ref 80–100)
NRBC # BLD: 0 /100 WBCS — SIGNIFICANT CHANGE UP (ref 0–0)
PHOSPHATE SERPL-MCNC: 3.1 MG/DL — SIGNIFICANT CHANGE UP (ref 2.5–4.5)
PLATELET # BLD AUTO: 134 K/UL — LOW (ref 150–400)
POTASSIUM SERPL-MCNC: 4.1 MMOL/L — SIGNIFICANT CHANGE UP (ref 3.5–5.3)
POTASSIUM SERPL-SCNC: 4.1 MMOL/L — SIGNIFICANT CHANGE UP (ref 3.5–5.3)
RBC # BLD: 4.97 M/UL — SIGNIFICANT CHANGE UP (ref 4.2–5.8)
RBC # FLD: 12.4 % — SIGNIFICANT CHANGE UP (ref 10.3–14.5)
SODIUM SERPL-SCNC: 142 MMOL/L — SIGNIFICANT CHANGE UP (ref 135–145)
WBC # BLD: 5.4 K/UL — SIGNIFICANT CHANGE UP (ref 3.8–10.5)
WBC # FLD AUTO: 5.4 K/UL — SIGNIFICANT CHANGE UP (ref 3.8–10.5)

## 2020-07-21 PROCEDURE — 99233 SBSQ HOSP IP/OBS HIGH 50: CPT

## 2020-07-21 RX ORDER — BENZTROPINE MESYLATE 1 MG
0.5 TABLET ORAL ONCE
Refills: 0 | Status: COMPLETED | OUTPATIENT
Start: 2020-07-21 | End: 2020-07-21

## 2020-07-21 RX ORDER — LANOLIN ALCOHOL/MO/W.PET/CERES
5 CREAM (GRAM) TOPICAL AT BEDTIME
Refills: 0 | Status: DISCONTINUED | OUTPATIENT
Start: 2020-07-21 | End: 2020-07-22

## 2020-07-21 RX ORDER — ATORVASTATIN CALCIUM 80 MG/1
40 TABLET, FILM COATED ORAL ONCE
Refills: 0 | Status: COMPLETED | OUTPATIENT
Start: 2020-07-21 | End: 2020-07-21

## 2020-07-21 RX ORDER — SENNA PLUS 8.6 MG/1
2 TABLET ORAL ONCE
Refills: 0 | Status: COMPLETED | OUTPATIENT
Start: 2020-07-21 | End: 2020-07-21

## 2020-07-21 RX ADMIN — Medication 1 APPLICATION(S): at 00:27

## 2020-07-21 RX ADMIN — Medication 0.5 MILLIGRAM(S): at 00:33

## 2020-07-21 RX ADMIN — SENNA PLUS 2 TABLET(S): 8.6 TABLET ORAL at 00:26

## 2020-07-21 RX ADMIN — ENOXAPARIN SODIUM 70 MILLIGRAM(S): 100 INJECTION SUBCUTANEOUS at 05:31

## 2020-07-21 RX ADMIN — CLOPIDOGREL BISULFATE 75 MILLIGRAM(S): 75 TABLET, FILM COATED ORAL at 12:22

## 2020-07-21 RX ADMIN — Medication 81 MILLIGRAM(S): at 12:22

## 2020-07-21 RX ADMIN — ATORVASTATIN CALCIUM 40 MILLIGRAM(S): 80 TABLET, FILM COATED ORAL at 00:27

## 2020-07-21 RX ADMIN — CHLORHEXIDINE GLUCONATE 1 APPLICATION(S): 213 SOLUTION TOPICAL at 05:29

## 2020-07-21 RX ADMIN — Medication 0.5 MILLIGRAM(S): at 22:04

## 2020-07-21 RX ADMIN — Medication 2 MILLIGRAM(S): at 02:42

## 2020-07-21 RX ADMIN — CARBIDOPA AND LEVODOPA 3 CAPSULE(S): 25; 100 TABLET ORAL at 17:20

## 2020-07-21 RX ADMIN — SENNA PLUS 2 TABLET(S): 8.6 TABLET ORAL at 22:05

## 2020-07-21 RX ADMIN — Medication 1 APPLICATION(S): at 17:20

## 2020-07-21 RX ADMIN — Medication 0.5 MILLIGRAM(S): at 00:26

## 2020-07-21 RX ADMIN — Medication 5 MILLIGRAM(S): at 22:03

## 2020-07-21 RX ADMIN — Medication 1 APPLICATION(S): at 05:30

## 2020-07-21 RX ADMIN — ENOXAPARIN SODIUM 70 MILLIGRAM(S): 100 INJECTION SUBCUTANEOUS at 17:20

## 2020-07-21 RX ADMIN — CARBIDOPA AND LEVODOPA 3 CAPSULE(S): 25; 100 TABLET ORAL at 12:22

## 2020-07-21 RX ADMIN — POLYETHYLENE GLYCOL 3350 17 GRAM(S): 17 POWDER, FOR SOLUTION ORAL at 12:25

## 2020-07-21 RX ADMIN — ATORVASTATIN CALCIUM 40 MILLIGRAM(S): 80 TABLET, FILM COATED ORAL at 22:04

## 2020-07-21 NOTE — PROGRESS NOTE ADULT - PROBLEM SELECTOR PLAN 2
Controlled on Rytaandrew mesaentin. Continue with home medications  In view of the patients parkinson's disease, please do not prescribe ativan only for sundowning dementia symptoms. Instead give zyprexa/ haldol + ativan.

## 2020-07-21 NOTE — PROGRESS NOTE ADULT - ATTENDING COMMENTS
I counseled the patient about the diagnoses of carotid dissection and aneurysm, and the appropriate treatment to lower the risk of recurrent strokes.
IMP:  This is a 70 yr old man  with  HTN and HLD presented with sudden onset l side weakness and numbness. CTA R ICA dissection with  CVA . Not a neurosurgical candidate. Neuro eval. medical management . NO TPA due to being out of window       -  CVA   -  R ICA dissection   -  HTN   -  HLD   -  Parkinson Disease      Plan:  -continue Q1 h neuro checks  -keep -140  -start pressors as needed  -hemodynamic monitoring   -start asa and plavix 7/20   -therapeutic anticoag  x 24 hours more as per Neuro F/U, will need MRA/ MRI if brain and neck  -neuroSurg eval noted  -feed  -statin .
IMP:  This is a 70 yr old man  with  HTN and HLD presented with sudden onset l side weakness and numbness. CTA R ICA dissection with  CVA . Not a neurosurgical candidate. Neuro eval. medical management . NO TPA due to being out of window      Assessment  1. CVA  2. R ICA dissection  3. HTN  4. HLD  5. Parkinson Disease    Plan:  -Close neurologic monitoring   -Anithypertensives restarted   -Vascular surgery recs noted  -hemodynamic monitoring   -Cont asa and plavix 7/20   -continue therapeutic anticoag   as per Neuro until MRA/ MRI if brain and neck  -neuroSurg eval noted  -feed  -statin  -PT evaluation  -Transfer to medical serivce
IMP:  This is a 70 yr old man  with  HTN and HLD presented with sudden onset l side weakness and numbness. CTA R ICA dissection with  CVA . Not a neurosurgical candidate. Neuro eval. medical management . NO TPA due to being out of window       -  CVA   -  R ICA dissection   -  HTN   -  HLD   -  Parkinson Disease      Plan:  -continue Q1 h neuro checks  -keep -140  -taper off pressors  as per neuro ( d/c permissive hypertension)   -hemodynamic monitoring   -start asa and plavix 7/20   -continue therapeutic anticoag   as per Neuro until MRA/ MRI if brain and neck  -neuroSurg eval noted  -feed  -statin .
IMP:  This is a 70 yr old man  with  HTN and HLD presented with sudden onset l side weakness and numbness. CTA R ICA dissection with  CVA . Not a neurosurgical candidate. Neuro eval. medical management . NO TPA due to being out of window       -  CVA   -  R ICA dissection   -  HTN   -  HLD   -  Parkinson Disease      Plan:  -continue Q1 h neuro checks  -keep -140  -hemodynamic monitoring   -start asa and plavix 7/20   -therapeutic anticoag  -feed  -statin

## 2020-07-21 NOTE — CHART NOTE - NSCHARTNOTEFT_GEN_A_CORE
Call team notified around 10pm that patient agitated and refusing medications. Patient seen and examined at bedside. A&Ox3 but very agitated, demanding to see log of his medications doses.  Nurse showed him this record, but patient was inconsolable and continued to speak with pressured speech and threaten to call the police.   Team notified around midnight that patient's agitation was continuing, and his SBPs were now 170s-180s. However, per nurse he is now amenable to taking medications. Reordered x1 dose of his standing lipitor, cogentin, senna, and clotrimazole so that they could be administered at this time, and ordered 0.5mg Ativan given his ongoing agitation and htn. Call team notified around 10pm that patient agitated and refusing medications. Patient seen and examined at bedside. A&Ox3 but very agitated, demanding to see log of his medications doses.  Nurse showed him this record, but patient was inconsolable and continued to speak with pressured speech and threaten to call the police.   Team notified around midnight that patient's agitation was continuing, and his SBPs were now 170s-180s. However, per nurse he is now amenable to taking medications. Reordered x1 dose of his standing lipitor, cogentin, senna, and clotrimazole so that they could be administered at this time, and ordered 0.5mg Ativan given his ongoing agitation and htn.  Update: Call team was notified by nursing manager Luann that patient was continuing to be very agitated despite the Ativan. Gave additional 2mg IV dose Ativan. Call team notified around 10pm that patient agitated and refusing medications. Patient seen and examined at bedside. A&Ox3 but very agitated, demanding to see log of his medications doses.  Nurse showed him this record, but patient was inconsolable and continued to speak with pressured speech and threaten to call the police.   Team notified around midnight that patient's agitation was continuing, and his SBPs were now 170s-180s. However, per nurse he is now amenable to taking medications. Reordered x1 dose of his standing lipitor, cogentin, senna, and clotrimazole so that they could be administered at this time, and ordered 0.5mg Ativan given his ongoing agitation and htn.  Update: Code sanabria called on patient for persistent agitation. Call team was notified by nursing manager Luann that patient was continuing to be very agitated despite the Ativan. Gave additional 2mg IV dose Ativan, nursing placed wrist restraints. Patient seen, bed had been placed at nursing station for reorientation and observation. Recommended returning to his room and keeping lights off to help him relax.

## 2020-07-21 NOTE — PROGRESS NOTE ADULT - SUBJECTIVE AND OBJECTIVE BOX
PGY-1 Progress Note discussed with attending    PAGER #: [[phone]] TILL 5:00 PM  PLEASE CONTACT ON CALL TEAM:  - On Call Team (Please refer to Alejandra) FROM 5:00 PM - 8:30PM  - Nightfloat Team FROM 8:30 -7:30 AM    CHIEF COMPLAINT & BRIEF HOSPITAL COURSE:    INTERVAL HPI/OVERNIGHT EVENTS:       REVIEW OF SYSTEMS:  CONSTITUTIONAL: No fever, weight loss, or fatigue  RESPIRATORY: No cough, wheezing, chills or hemoptysis; No shortness of breath  CARDIOVASCULAR: No chest pain, palpitations, dizziness, or leg swelling  GASTROINTESTINAL: No abdominal pain. No nausea, vomiting, or hematemesis; No diarrhea or constipation. No melena or hematochezia.  GENITOURINARY: No dysuria or hematuria, urinary frequency  NEUROLOGICAL: No headaches, memory loss, loss of strength, numbness, or tremors  SKIN: No itching, burning, rashes, or lesions     MEDICATIONS  (STANDING):  aspirin  chewable 81 milliGRAM(s) Oral daily  atorvastatin 40 milliGRAM(s) Oral at bedtime  benztropine 0.5 milliGRAM(s) Oral at bedtime  carbidopa 48.75 mG/levodopa 195 mG ER 3 Capsule(s) Oral <User Schedule>  clopidogrel Tablet 75 milliGRAM(s) Oral daily  clotrimazole 1% Cream 1 Application(s) Topical two times a day  enoxaparin Injectable 70 milliGRAM(s) SubCutaneous two times a day  losartan 25 milliGRAM(s) Oral daily  melatonin 5 milliGRAM(s) Oral at bedtime  pantoprazole    Tablet 40 milliGRAM(s) Oral before breakfast  polyethylene glycol 3350 17 Gram(s) Oral daily  senna 2 Tablet(s) Oral at bedtime    MEDICATIONS  (PRN):  acetaminophen   Tablet .. 650 milliGRAM(s) Oral every 6 hours PRN Temp greater or equal to 38C (100.4F), Mild Pain (1 - 3), Moderate Pain (4 - 6)      Vital Signs Last 24 Hrs  T(C): 36.4 (21 Jul 2020 11:23), Max: 37.2 (20 Jul 2020 19:53)  T(F): 97.6 (21 Jul 2020 11:23), Max: 99 (20 Jul 2020 19:53)  HR: 60 (21 Jul 2020 11:23) (55 - 94)  BP: 135/66 (21 Jul 2020 11:23) (111/58 - 181/71)  BP(mean): 79 (20 Jul 2020 13:15) (74 - 79)  RR: 18 (21 Jul 2020 11:23) (14 - 19)  SpO2: 100% (21 Jul 2020 11:23) (81% - 100%)    PHYSICAL EXAMINATION:  GENERAL: NAD, well built  HEAD:  Atraumatic, Normocephalic  EYES:  conjunctiva and sclera clear  NECK: Supple, No JVD, Normal thyroid  CHEST/LUNG: Clear to auscultation. Clear to percussion bilaterally; No rales, rhonchi, wheezing, or rubs  HEART: Regular rate and rhythm; No murmurs, rubs, or gallops  ABDOMEN: Soft, Nontender, Nondistended; Bowel sounds present  NERVOUS SYSTEM:  Alert & Oriented X3,    EXTREMITIES:  2+ Peripheral Pulses, No clubbing, cyanosis, or edema  SKIN: warm dry                          14.6   5.40  )-----------( 134      ( 21 Jul 2020 07:23 )             43.8     07-21    142  |  105  |  7   ----------------------------<  96  4.1   |  28  |  0.85    Ca    8.9      21 Jul 2020 07:23  Phos  3.1     07-21  Mg     2.4     07-21    TPro  6.0  /  Alb  2.8<L>  /  TBili  0.4  /  DBili  x   /  AST  43<H>  /  ALT  12  /  AlkPhos  41  07-20    LIVER FUNCTIONS - ( 20 Jul 2020 06:21 )  Alb: 2.8 g/dL / Pro: 6.0 g/dL / ALK PHOS: 41 U/L / ALT: 12 U/L DA / AST: 43 U/L / GGT: x                   CAPILLARY BLOOD GLUCOSE      RADIOLOGY & ADDITIONAL TESTS: PGY-1 Progress Note discussed with attending    PAGER #: [[phone]] TILL 5:00 PM  PLEASE CONTACT ON CALL TEAM:  - On Call Team (Please refer to Alejandra) FROM 5:00 PM - 8:30PM  - Nightfloat Team FROM 8:30 -7:30 AM    CHIEF COMPLAINT & BRIEF HOSPITAL COURSE:  HPI:  71 y/o male with PMH of HTN, HLD and Parkinson disease was admitted with numbness to the left arm.    INTERVAL HPI/OVERNIGHT EVENTS:   Patient was seen yesterday evening after he was shifted down from ICU. He was A0x3, spoke with a slow and affect, and answered appropriately at that time. Overnight he was reportedly agitated and was given Ativan 2mg abd placed in restraints by the NF team. When the patient was examined in the morning, he was drowsy, A0x1-2, and refused examination. He was denies any upper or lower extremity weakness, slurring speech, confusion blurred vison, chest pain, palpitations, fever, cough or any other acute complaints.     In view of the patients parkinson's disease, please do not prescribe ativan only for sundowning dementia symptoms. Instead give zyprexa/ haldol + ativan.          REVIEW OF SYSTEMS:  CONSTITUTIONAL: No fever, weight loss, or fatigue  RESPIRATORY: No cough, wheezing, chills or hemoptysis; No shortness of breath  CARDIOVASCULAR: No chest pain, palpitations, dizziness, or leg swelling  GASTROINTESTINAL: No abdominal pain. No nausea, vomiting, or hematemesis; No diarrhea or constipation. No melena or hematochezia.  GENITOURINARY: No dysuria or hematuria, urinary frequency  NEUROLOGICAL: No headaches, memory loss, loss of strength, numbness, or tremors  SKIN: No itching, burning, rashes, or lesions     MEDICATIONS  (STANDING):  aspirin  chewable 81 milliGRAM(s) Oral daily  atorvastatin 40 milliGRAM(s) Oral at bedtime  benztropine 0.5 milliGRAM(s) Oral at bedtime  carbidopa 48.75 mG/levodopa 195 mG ER 3 Capsule(s) Oral <User Schedule>  clopidogrel Tablet 75 milliGRAM(s) Oral daily  clotrimazole 1% Cream 1 Application(s) Topical two times a day  enoxaparin Injectable 70 milliGRAM(s) SubCutaneous two times a day  losartan 25 milliGRAM(s) Oral daily  melatonin 5 milliGRAM(s) Oral at bedtime  pantoprazole    Tablet 40 milliGRAM(s) Oral before breakfast  polyethylene glycol 3350 17 Gram(s) Oral daily  senna 2 Tablet(s) Oral at bedtime    MEDICATIONS  (PRN):  acetaminophen   Tablet .. 650 milliGRAM(s) Oral every 6 hours PRN Temp greater or equal to 38C (100.4F), Mild Pain (1 - 3), Moderate Pain (4 - 6)      Vital Signs Last 24 Hrs  T(C): 36.4 (21 Jul 2020 11:23), Max: 37.2 (20 Jul 2020 19:53)  T(F): 97.6 (21 Jul 2020 11:23), Max: 99 (20 Jul 2020 19:53)  HR: 60 (21 Jul 2020 11:23) (55 - 94)  BP: 135/66 (21 Jul 2020 11:23) (111/58 - 181/71)  BP(mean): 79 (20 Jul 2020 13:15) (74 - 79)  RR: 18 (21 Jul 2020 11:23) (14 - 19)  SpO2: 100% (21 Jul 2020 11:23) (81% - 100%)    PHYSICAL EXAMINATION:  GENERAL: NAD, well built  HEAD:  Atraumatic, Normocephalic  EYES:  conjunctiva and sclera clear  NECK: Supple, No JVD, Normal thyroid  CHEST/LUNG: Clear to auscultation. Clear to percussion bilaterally; No rales, rhonchi, wheezing, or rubs  HEART: Regular rate and rhythm; No murmurs, rubs, or gallops  ABDOMEN: Soft, Nontender, Nondistended; Bowel sounds present  NERVOUS SYSTEM:  Alert & Oriented X3, pill rolling tremor +  EXTREMITIES:  2+ Peripheral Pulses, No clubbing, cyanosis, or edema, left arm is contracted   SKIN: warm dry                          14.6   5.40  )-----------( 134      ( 21 Jul 2020 07:23 )             43.8     07-21    142  |  105  |  7   ----------------------------<  96  4.1   |  28  |  0.85    Ca    8.9      21 Jul 2020 07:23  Phos  3.1     07-21  Mg     2.4     07-21    TPro  6.0  /  Alb  2.8<L>  /  TBili  0.4  /  DBili  x   /  AST  43<H>  /  ALT  12  /  AlkPhos  41  07-20    LIVER FUNCTIONS - ( 20 Jul 2020 06:21 )  Alb: 2.8 g/dL / Pro: 6.0 g/dL / ALK PHOS: 41 U/L / ALT: 12 U/L DA / AST: 43 U/L / GGT: x                   CAPILLARY BLOOD GLUCOSE      RADIOLOGY & ADDITIONAL TESTS:      < from: MR Angio Neck w/ IV Cont (07.20.20 @ 18:41) >  IMPRESSION:     MRI BRAIN: No acute intracranial findings. Very mild chronic microvascular ischemic disease.    MRA NECK: Please note, study is significantly degraded due to patient motion artifact. Limited evaluation of the focal dissection at the distal right cervical ICA, please see CTA neck 7/16/2020 for further assessment.     MRA HEAD: Saccular aneurysm measuring 3 x 2.5 mm at the right MCA bifurcation. No evidence of vascular stenosis, occlusion, or vascular malformation.  ----------------------------------------------------------------------  NASCET criteria for internal carotid artery stenosis:  Mild: 0% to 49%  Moderate: 50% to 69%  Severe: 70% to 99%  Complete Occlusion        < end of copied text >      < from: US Duplex Carotid Arteries Complete, Bilateral (07.17.20 @ 14:13) >  FINDINGS:   Mild intimal thickening. Patient refused imaging of the left neck. No dissection visualized.    Peak systolic velocities are as follows (in cm/sec):     RIGHT:    PROX CCA = 115 ;  DIST CCA = 97 ;  PROX ICA = 43 ;  DIST ICA = 66 ; ECA = 44     There is antegrade flow through both vertebral arteries.     IMPRESSION:   No hemodynamically significant right carotid artery stenoses. No visualized dissection on the right.    Patient refused imaging of the left neck.                  TONI PETIT M.D., ATTENDING RADIOLOGIST  This document has been electronically signed. Jul 17 2020  2:43PM        < end of copied text >

## 2020-07-21 NOTE — PROGRESS NOTE ADULT - PROBLEM SELECTOR PLAN 3
IMPROVE VTE Individual Risk Assessment  On full dose Lovenox 70 mg  RISK                                                                Points  [  ] Previous VTE                                                  3  [  ] Thrombophilia                                               2  [  ] Lower limb paralysis                                      2        (unable to hold up >15 seconds)    [  ] Current Cancer                                              2         (within 6 months)  [x  ] Immobilization > 24 hrs                                1  [x  ] ICU/CCU stay > 24 hours                              1  [x  ] Age > 60                                                      1  IMPROVE VTE Score _____3____

## 2020-07-21 NOTE — PROGRESS NOTE ADULT - ASSESSMENT
This is a 69 y/o male admitted to medicine for left sided numbness and weakness with CTA head/neck finding of right ICA dissection. Patient is accepted to ICU at this time for strict blood pressure monitoring with goal MAP between 100-140.    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 Dey) advised against transfer of patient; advised medical management  case discussed with Dr. Barrow - admitted  to ICU for strict BP monitoring  goal MAP between 100-140 for adequate cerebral perfusion, on/off phenyleprine  No evidence of dissection on doppler- it is likely that anatomical features of the patient (prominent calcification of neck cartilage) limited a detailed study of every part of the arteries.  c/w lovenox full dose until pt gets MRI/MRA head w/o cont and MRA neck w/ cont; started on asa, plavix 7/19 -pt is stable for DG for MRI today   vascular Dr. Shannon   neuro checks per routine  MRI head/neck when downgraded    #Parkinson's disease  c/w Rytary  c/w cogentin    CVS:  #right ICA dissection  restarted ASA/Plavix   lipid panel shows cholesterol 136     Pulm:  no active issues  COVID-19 negative x1    GI:  no active issues  diet as tolerated    Renal:  no active issues  renal function wnl    Endo:  no active issues  5.8 HbA1C and TSH 1.87    ID:  no active issues    Prophylaxis:  full dose lovenox as advised by neuro    DISPO:  DG to general floors This is a 69 y/o male admitted to medicine for left sided numbness and weakness with CTA head/neck finding of right ICA dissection. Patient is accepted to ICU at this time for strict blood pressure monitoring with goal MAP between 100-140.      Endo:  no active issues  5.8 HbA1C and TSH 1.87    full dose lovenox as advised by neuro    DISPO:  DG to general floors

## 2020-07-21 NOTE — PROGRESS NOTE ADULT - SUBJECTIVE AND OBJECTIVE BOX
No neurological events overnight. Patient is eating his meal without difficulty.  Neurological exam notable for disorientation to time and sensory deficit to LT & PP at left arm and left leg (NIHSS 1).  MRI/MRA Brain shows ICA dissection and small MCA aneurysm.    Recs:  - Aspirin 81mg daily & Clopidogrel 75mg daily for 6 months to prevent embolic strokes during recovery of dissection  - Additional anticoagulation with enoxaparin is not needed as per most recent guidelines  - Continue atorvastatin 40mg QHS  - Follow up with Neurology / Stroke, which can then arrange referral to Neurosurgery as needed      NOTE TO BE COMPLETED - PLEASE REFER TO ABOVE ONLY AND IGNORE INFORMATION BELOW    Neurology Follow up note    Name  JAYDON FOX    HPI:  71 y/o male with PMH of HTN, HLD and Parkinson disease come to ED s with numbness to the left arm that started yesterday around 8pm. Patient thought it would get better but it did not so he came to the ED. Pt denies any recent trauma to neck. Pt denies any upper or lower extremity weakness, slurring speech, confusion blurred vison, chest pain, palpitations, fever, cough or any other acute complaints.   In ED, /65, HR 53 (16 Jul 2020 22:42)      Interval History -        Subjective:        MEDICATIONS  (STANDING):  aspirin  chewable 81 milliGRAM(s) Oral daily  atorvastatin 40 milliGRAM(s) Oral at bedtime  benztropine 0.5 milliGRAM(s) Oral at bedtime  carbidopa 48.75 mG/levodopa 195 mG ER 3 Capsule(s) Oral <User Schedule>  clopidogrel Tablet 75 milliGRAM(s) Oral daily  clotrimazole 1% Cream 1 Application(s) Topical two times a day  enoxaparin Injectable 70 milliGRAM(s) SubCutaneous two times a day  losartan 25 milliGRAM(s) Oral daily  melatonin 5 milliGRAM(s) Oral at bedtime  pantoprazole    Tablet 40 milliGRAM(s) Oral before breakfast  polyethylene glycol 3350 17 Gram(s) Oral daily  senna 2 Tablet(s) Oral at bedtime    MEDICATIONS  (PRN):  acetaminophen   Tablet .. 650 milliGRAM(s) Oral every 6 hours PRN Temp greater or equal to 38C (100.4F), Mild Pain (1 - 3), Moderate Pain (4 - 6)      Allergies    No Known Allergies    Intolerances        Review of Systems:  General: [ ] None, [ ] chills, [ ]fatigue, [ ] fevers  Skin: [ ] None, [ ] rash   HEENT: [ ] None, [ ] head injury, [ ] blurred vision, [ ] double vision, [ ] eye pain, [ ] visual loss, [ ] hearing loss, [ ] deafness, [ ] ear pain, [ ] ringing in the ears, [ ] vertigo, [ ] sinus pain, [ ] voice changes  Neck: [ ] None, [ ] neck stiffness  Respiratory: [ ] None, [ ] cough, [ ] difficulty breathing  Cardiovascular: [ ] None, [ ] calf cramps, [ ] chest pain, [ ] leg pain, [ ] swelling, [ ] rapid heart rate, [ ] shortness of breath  Gastrointestinal: [ ] None, [ ] abdominal pain, [ ] nausea, [ ] vomiting  Musculoskeletal: [ ] None, [ ] back pain, [ ] joint pain, [ ] joint stiffness, [ ] leg cramps, [ ] muscle atrophy, [ ] muscle cramps, [ ] muscle weakness, [ ] swelling of extremities  Neurological: [ ] None, [ ] Dizziness, [ ] decreased memory, [ ] fainting, [ ] focal neurological symptoms, [ ] headaches, [ ] incontinence of stool, [ ] incontinence of urine, [ ] loss of consciousness, [ ] numbness, [ ] seizures, [ ] spinning sensation, [ ] stroke, [ ] trouble walking, [ ] unsteadiness, [ ] visual changes, [ ] weakness  Psychiatric: [ ] None,  [ ] depression, [ ] anxiety, [ ] hallucinations, [ ] inability to concentrate, [ ] mood changes, [ ] panic attacks  Hematology: [ ] None,  [ ] blood clots, [ ] spontaneous bleeding      Objective:   Vital Signs Last 24 Hrs  T(C): 37.1 (21 Jul 2020 15:47), Max: 37.2 (20 Jul 2020 19:53)  T(F): 98.8 (21 Jul 2020 15:47), Max: 99 (20 Jul 2020 19:53)  HR: 78 (21 Jul 2020 15:47) (55 - 94)  BP: 157/83 (21 Jul 2020 15:47) (125/62 - 181/71)  BP(mean): --  RR: 19 (21 Jul 2020 15:47) (16 - 19)  SpO2: 98% (21 Jul 2020 15:47) (97% - 100%)    General Exam:   General appearance: No acute distress                 Cardiovascular: Pedal dorsalis pulses intact bilaterally    Neurological Exam:  Mental Status: Orientated to self, date and place.  Attention intact.  No dysarthria, aphasia or neglect.  Knowledge intact.  Registration intact.  Short and long term memory grossly intact.      Cranial Nerves: CN I - not tested.  PERRL, EOMI, VFF, no nystagmus or diplopia.  No APD.  Fundi not visualized bilaterally.  CN V1-3 intact to light touch and pinprick.  No facial asymmetry.  Hearing intact to finger rub bilaterally.  Tongue, uvula and palate midline.  Sternocleidomastoid and Trapezius intact bilaterally.    Motor:   Tone: normal.                  Strength: intact throughout  Pronator drift: none                 Dysmeria: None to finger-nose-finger or heel-shin-heel  No truncal ataxia.    Tremor: No resting, postural or action tremor.  No myoclonus.    Sensation: intact to light touch, pinprick, vibration and proprioception    Deep Tendon Reflexes: 1+ bilateral biceps, triceps, brachioradialis, knee and ankle  Toes flexor bilaterally    Gait: normal and stable.      Other:    07-21    142  |  105  |  7   ----------------------------<  96  4.1   |  28  |  0.85    Ca    8.9      21 Jul 2020 07:23  Phos  3.1     07-21  Mg     2.4     07-21    TPro  6.0  /  Alb  2.8<L>  /  TBili  0.4  /  DBili  x   /  AST  43<H>  /  ALT  12  /  AlkPhos  41  07-20 07-21    142  |  105  |  7   ----------------------------<  96  4.1   |  28  |  0.85    Ca    8.9      21 Jul 2020 07:23  Phos  3.1     07-21  Mg     2.4     07-21    TPro  6.0  /  Alb  2.8<L>  /  TBili  0.4  /  DBili  x   /  AST  43<H>  /  ALT  12  /  AlkPhos  41  07-20    LIVER FUNCTIONS - ( 20 Jul 2020 06:21 )  Alb: 2.8 g/dL / Pro: 6.0 g/dL / ALK PHOS: 41 U/L / ALT: 12 U/L DA / AST: 43 U/L / GGT: x             Radiology    EKG:  tele:  TTE:  EEG:                 Please contact the Neurology consult service with any questions.    Darrell Rachel MD   of Neurology  [facility] [employer] [facility] Neurology Follow up note    Name  JAYDON FOX    HPI / NEURO HPI:  71 y/o male with PMH of HTN, HLD and Parkinson disease come to ED s with numbness to the left arm that started yesterday around 8pm. Patient thought it would get better but it did not so he came to the ED. Pt denies any recent trauma to neck. Pt denies any upper or lower extremity weakness, slurring speech, confusion blurred vison, chest pain, palpitations, fever, cough or any other acute complaints. In ED, /65, HR 53 (16 Jul 2020 22:42)    Interval History -  No neurological events overnight. Patient is eating his meal without difficulty.    MEDICATIONS  (STANDING):  aspirin  chewable 81 milliGRAM(s) Oral daily  atorvastatin 40 milliGRAM(s) Oral at bedtime  benztropine 0.5 milliGRAM(s) Oral at bedtime  carbidopa 48.75 mG/levodopa 195 mG ER 3 Capsule(s) Oral <User Schedule>  clopidogrel Tablet 75 milliGRAM(s) Oral daily  clotrimazole 1% Cream 1 Application(s) Topical two times a day  enoxaparin Injectable 70 milliGRAM(s) SubCutaneous two times a day  losartan 25 milliGRAM(s) Oral daily  melatonin 5 milliGRAM(s) Oral at bedtime  pantoprazole    Tablet 40 milliGRAM(s) Oral before breakfast  polyethylene glycol 3350 17 Gram(s) Oral daily  senna 2 Tablet(s) Oral at bedtime    MEDICATIONS  (PRN):  acetaminophen   Tablet .. 650 milliGRAM(s) Oral every 6 hours PRN Temp greater or equal to 38C (100.4F), Mild Pain (1 - 3), Moderate Pain (4 - 6)    Allergies  No Known Allergies    Review of Systems: Fourteen systems reviewed and negative except as in HPI / Interval History.      Objective:   Vital Signs Last 24 Hrs  T(C): 37.1 (21 Jul 2020 15:47), Max: 37.2 (20 Jul 2020 19:53)  T(F): 98.8 (21 Jul 2020 15:47), Max: 99 (20 Jul 2020 19:53)  HR: 78 (21 Jul 2020 15:47) (55 - 94)  BP: 157/83 (21 Jul 2020 15:47) (125/62 - 181/71)  RR: 19 (21 Jul 2020 15:47) (16 - 19)  SpO2: 98% (21 Jul 2020 15:47) (97% - 100%)    General Exam:  General: No acute distress  Respiratory: CTAB/l.  No crackles, rhonchi, or wheezes.  Cardiovascular: RRR, No murmurs, Full b/l radial and pedal pulses    Neurological exam:  Mental status: AAO x 2 (not to time). Appropriately interactive, normal affect. Speech fluent, No Aphasia or paraphasic errors. Naming / repetition intact.  CN: MORGAN, EOMI, VFF, facial sensation to LT & PP diminished at left V1-V3, left NLF flattening (1), tongue midline, Palate moves equally, SCM equal bilaterally  Motor: No pronator drift, Strength 5/5 in all 4 ext, normal bulk and tone, no tremor, rigidity or bradykinesia.    Sens: Diminished to LT & PP at left arm and leg (1). Intact to LT & PP at right arm and leg.  Reflexes: Symmetric and 2+ at b/l biceps, triceps, brachioradialis, patellae, and ankles.  Coord:  No dysmetria x 4, NEYMAR intact   Gait/Romberg: Deferred per patient request    NIHSS Score:    LOC - 0  LOC Questions - 0  LOC Commands - 0  Gaze Preference - 0  Visual Fields - 0  Facial Palsy - 1  Motor Arm Left - 0  Motor Arm Right - 0  Motor Leg Left - 0  Motor Leg Right - 0  Limb Ataxia - 0  Sensory - 1  Language - 0  Speech - 0  Extinction - 0    NIHSS Score Total: 2    Modified Burleson Scale: 2      Labs:    07-21    142  |  105  |  7   ----------------------------<  96  4.1   |  28  |  0.85    Ca    8.9      21 Jul 2020 07:23  Phos  3.1     07-21  Mg     2.4     07-21    TPro  6.0  /  Alb  2.8<L>  /  TBili  0.4  /  DBili  x   /  AST  43<H>  /  ALT  12  /  AlkPhos  41  07-20    A1C with Estimated Average Glucose (07.17.20 @ 09:11)    A1C with Estimated Average Glucose Result: 5.8%    Estimated Average Glucose: 120 mg/dL    Lipid Profile (07.17.20 @ 06:53)    Total Cholesterol/HDL Ratio Measurement: 3.0 RATIO    Cholesterol, Serum: 136 mg/dL    Triglycerides, Serum: 64 mg/dL    HDL Cholesterol, Serum: 45 mg/dL    Direct LDL: 78 mg/dL      Neuroimaging:    CTA Head/Neck (7/16/20):  - Distal right ICA dissection  - No other intracranial or neck vessel abnormality    Carotid Doppler (7/17/20):  - No hemodynamically significant stenosis in b/l carotid arteries    Echo (7/17/20):  - LVEF > 55%  - No cardiac thrombus or intracardiac shunt identified  - Stage I (mild) diastolic dysfunction  - Mild tricuspid regurgitation    MRI Head (7/20/20):  - No acute intracranial structural abnormalities (limited evaluation due to motion artifact)  - Chronic microvascular disease    MRA Head/Neck (7/20/20):  - Suspected distal right ICA dissection (limited evaluation due to motion artifact)  - Right MCA bifurcation saccular aneurysm, 3mm x 2.5mm      Assessment:  70 RHM with ischemic stroke secondary to distal right ICA dissection      Recommendations:    - Aspirin 81mg daily & Clopidogrel 75mg daily for 6 months to prevent embolic strokes during recovery of dissection    - Additional anticoagulation with enoxaparin is not needed for treatment of carotid dissection as per most recent guidelines    - Continue atorvastatin 40mg QHS    - Follow up with Neurology / Stroke clinic, which can then arrange referral to Neurosurgery as needed for right MCA saccular aneurysm      Please contact the Neurology consult service with any questions.    Darrell Rachel MD   of Neurology  [facility]

## 2020-07-21 NOTE — PROGRESS NOTE ADULT - SUBJECTIVE AND OBJECTIVE BOX
Patient is a 70y old  Male who presents with a chief complaint of Stroke (19 Jul 2020 13:57)    pt seen in icu [x  ], reg med floor [   ], bed [ x ], chair at bedside [   ], a+o x3 [ x ], lethargic [  ],    nad [ x ]      Allergies    No Known Allergies        Vitals    T(F): 97.5 (07-21-20 @ 04:58), Max: 99 (07-20-20 @ 19:53)  HR: 61 (07-21-20 @ 04:58) (61 - 98)  BP: 140/60 (07-21-20 @ 04:58) (111/58 - 181/71)  RR: 18 (07-21-20 @ 04:58) (14 - 20)  SpO2: 97% (07-21-20 @ 04:58) (81% - 100%)  Wt(kg): --  CAPILLARY BLOOD GLUCOSE          Labs                          13.6   3.22  )-----------( 121      ( 20 Jul 2020 06:21 )             40.5       07-20    140  |  107  |  10  ----------------------------<  86  3.9   |  25  |  0.68    Ca    8.3<L>      20 Jul 2020 06:21  Phos  3.2     07-20  Mg     2.2     07-20    TPro  6.0  /  Alb  2.8<L>  /  TBili  0.4  /  DBili  x   /  AST  43<H>  /  ALT  12  /  AlkPhos  41  07-20                Radiology Results      Meds    MEDICATIONS  (STANDING):  aspirin  chewable 81 milliGRAM(s) Oral daily  atorvastatin 40 milliGRAM(s) Oral at bedtime  benztropine 0.5 milliGRAM(s) Oral at bedtime  carbidopa 48.75 mG/levodopa 195 mG ER 3 Capsule(s) Oral <User Schedule>  chlorhexidine 2% Cloths 1 Application(s) Topical <User Schedule>  clopidogrel Tablet 75 milliGRAM(s) Oral daily  clotrimazole 1% Cream 1 Application(s) Topical two times a day  enoxaparin Injectable 70 milliGRAM(s) SubCutaneous two times a day  losartan 25 milliGRAM(s) Oral daily  pantoprazole    Tablet 40 milliGRAM(s) Oral before breakfast  polyethylene glycol 3350 17 Gram(s) Oral daily  senna 2 Tablet(s) Oral at bedtime      MEDICATIONS  (PRN):  acetaminophen   Tablet .. 650 milliGRAM(s) Oral every 6 hours PRN Temp greater or equal to 38C (100.4F), Mild Pain (1 - 3), Moderate Pain (4 - 6)      Physical Exam    Neuro :  no focal deficits  Respiratory: CTA B/L  CV: RRR, S1S2, no murmurs,   Abdominal: Soft, NT, ND +BS,  Extremities: No edema, + peripheral pulses    ASSESSMENT    right Carotid dissection SHIREEN with stroke  h/o Parkinson disease  Other hyperlipidemia  Essential hypertension        PLAN    cont icu monitoring  No IV tpa given because beyond time window and non-disabling deficits  cont ASA and PLavix   Statin  neuro f/u noted  Discontinue permissive BP regulation;   continue anticoagulation.   f/u MRI head and MRA head without contrast and MRA Neck with contrast.   If MRA shows healing of dissection may be discharged on aspirin and clopidogrel atorvastatin  If stable he may discontinue anticoagulation after MRI Head and MRA head wo contrast  and MRA neck neck with contrast.   Discussed with his primary neurologist Dr Veliz and explained that Dr Rodriguez neurovascular surgeon had been contacted in [place] who had advised conservative management.   vascular cons noted   no vascular surgery intervention at this time  a-line placed on 7/17  holding home antihypertensives   GI ppx with protonix  COVID-19 negative x1   cont current meds  cont mgmt as per icu Patient is a 70y old  Male who presents with a chief complaint of Stroke (19 Jul 2020 13:57)    pt seen in icu [x  ], reg med floor [   ], bed [ x ], chair at bedside [   ], a+o x3 [  ], somnolent but arouseable [x  ],    nad [ x ]      pt agitated overnight and was given ativan      Allergies    No Known Allergies        Vitals    T(F): 97.5 (07-21-20 @ 04:58), Max: 99 (07-20-20 @ 19:53)  HR: 61 (07-21-20 @ 04:58) (61 - 98)  BP: 140/60 (07-21-20 @ 04:58) (111/58 - 181/71)  RR: 18 (07-21-20 @ 04:58) (14 - 20)  SpO2: 97% (07-21-20 @ 04:58) (81% - 100%)  Wt(kg): --  CAPILLARY BLOOD GLUCOSE          Labs                          13.6   3.22  )-----------( 121      ( 20 Jul 2020 06:21 )             40.5       07-20    140  |  107  |  10  ----------------------------<  86  3.9   |  25  |  0.68    Ca    8.3<L>      20 Jul 2020 06:21  Phos  3.2     07-20  Mg     2.2     07-20    TPro  6.0  /  Alb  2.8<L>  /  TBili  0.4  /  DBili  x   /  AST  43<H>  /  ALT  12  /  AlkPhos  41  07-20                Radiology Results      < from: MR Head No Cont (07.20.20 @ 18:41) >  MRI BRAIN:    There are scattered a few T2/FLAIR hyperintense changes in the periventricular white matter, findings consistent with very mild chronic microvascular ischemic disease.    No acute infarction, intracranial hemorrhage or mass.    There is no evidence of hydrocephalus. There are no extra-axial fluid collections.     The visualized intraorbital contentsare normal. There are mucosal palmitate changes of the ethmoid air cells and minimal mucosal thickening in the maxillary sinuses. The mastoid air cells are grossly clear. The visualized soft tissues and osseous structures appear unremarkable.       MRA NECK:    Please note, images are significantly degraded due to patient motion artifact.    The visualized portion of the aortic arch is unremarkable in appearance. The origins of the great vessels and the vertebral arteries are without evidence of stenosis.    The common carotid arteries are patent bilaterally without evidence of stenosis. Limited evaluation of the focal dissection at the distal right cervical ICA, please see CTA neck 7/16/2020 for further assessment. There is no gross narrowing of the cervical internal carotid arteries bilaterally.      The vertebral arteries are patent bilaterally throughout their course.      MRA HEAD:    Evaluation of the anterior circulation demonstrates normal distal internal carotid arteries.  There is a saccular aneurysm measuring 3 x 2.5 mm  at the right MCA bifurcation (series 1, image [id number]). Otherwise, the proximal anterior, middle and posterior cerebral arteries are patent bilaterally.    Evaluation of the posterior circulation demonstratespatent vertebrobasilar system. Please note, the right vertebral artery is markedly hypoplastic distal to the takeoff of the right PICA, findings favored to represent developmental variant.    No evidence of vascular stenosis, occlusion, or vascular malformation in the [place].       IMPRESSION:     MRI BRAIN: No acute intracranial findings. Very mild chronic microvascular ischemic disease.    MRA NECK: Please note, study is significantly degraded due to patient motion artifact. Limited evaluation of the focal dissection at the distal right cervical ICA, please see CTA neck 7/16/2020 for further assessment.     MRA HEAD: Saccular aneurysm measuring 3 x 2.5 mm at the right MCA bifurcation. No evidence of vascular stenosis, occlusion, or vascular malformation.    < end of copied text >          Meds    MEDICATIONS  (STANDING):  aspirin  chewable 81 milliGRAM(s) Oral daily  atorvastatin 40 milliGRAM(s) Oral at bedtime  benztropine 0.5 milliGRAM(s) Oral at bedtime  carbidopa 48.75 mG/levodopa 195 mG ER 3 Capsule(s) Oral <User Schedule>  chlorhexidine 2% Cloths 1 Application(s) Topical <User Schedule>  clopidogrel Tablet 75 milliGRAM(s) Oral daily  clotrimazole 1% Cream 1 Application(s) Topical two times a day  enoxaparin Injectable 70 milliGRAM(s) SubCutaneous two times a day  losartan 25 milliGRAM(s) Oral daily  pantoprazole    Tablet 40 milliGRAM(s) Oral before breakfast  polyethylene glycol 3350 17 Gram(s) Oral daily  senna 2 Tablet(s) Oral at bedtime      MEDICATIONS  (PRN):  acetaminophen   Tablet .. 650 milliGRAM(s) Oral every 6 hours PRN Temp greater or equal to 38C (100.4F), Mild Pain (1 - 3), Moderate Pain (4 - 6)      Physical Exam    Neuro :  no focal deficits  Respiratory: CTA B/L  CV: RRR, S1S2, no murmurs,   Abdominal: Soft, NT, ND +BS,  Extremities: No edema, + peripheral pulses    ASSESSMENT    right Carotid dissection SHIREEN with stroke  h/o Parkinson disease  Other hyperlipidemia  Essential hypertension        PLAN      No IV tpa given because beyond time window and non-disabling deficits  cont ASA and PLavix   Statin  neuro f/u   continue anticoagulation.   MRI head and MRA head without contrast and MRA Neck with contrast with MRI BRAIN: No acute intracranial findings. Very mild chronic microvascular ischemic disease.  MRA NECK: Please note, study is significantly degraded due to patient motion artifact. Limited evaluation of the focal dissection at the distal right cervical ICA, please see CTA neck 7/16/2020 for further assessment.   MRA HEAD: Saccular aneurysm measuring 3 x 2.5 mm at the right MCA bifurcation. No evidence of vascular stenosis, occlusion, or vascular malformation noted above.   vascular cons noted   no vascular surgery intervention at this time  cont losartan  GI ppx with protonix  COVID-19 negative x1   cont current meds

## 2020-07-21 NOTE — PROGRESS NOTE ADULT - PROBLEM SELECTOR PLAN 1
2/2 to right ICA dissection  CTA head/neck shows evidence of right distal cervical ICA dissection  patient's symptoms of left UE weakness and numbness are partially resolved at this time  Downgrade from ICU where he was admitted  to ICU for strict BP monitoring with  goal MAP between 100-140 for adequate cerebral perfusion, on/off phenyleprine  neurosurgery (Dr. Dey) advised against transfer of patient; advised medical management  case discussed with Dr. Rachel  No evidence of dissection on doppler US of carotids   c/w lovenox full dose   MRI/MRA head w/o cont and MRA neck w/ cont showed incidental MCA aneurysm 3x2.5mm   Continued on asa, plavix    vascular Dr. Shannon   neuro checks per routine

## 2020-07-22 ENCOUNTER — TRANSCRIPTION ENCOUNTER (OUTPATIENT)
Age: 70
End: 2020-07-22

## 2020-07-22 VITALS
HEART RATE: 68 BPM | OXYGEN SATURATION: 99 % | TEMPERATURE: 98 F | SYSTOLIC BLOOD PRESSURE: 131 MMHG | RESPIRATION RATE: 18 BRPM | DIASTOLIC BLOOD PRESSURE: 53 MMHG

## 2020-07-22 LAB
ANION GAP SERPL CALC-SCNC: 8 MMOL/L — SIGNIFICANT CHANGE UP (ref 5–17)
BUN SERPL-MCNC: 11 MG/DL — SIGNIFICANT CHANGE UP (ref 7–18)
CALCIUM SERPL-MCNC: 9 MG/DL — SIGNIFICANT CHANGE UP (ref 8.4–10.5)
CHLORIDE SERPL-SCNC: 104 MMOL/L — SIGNIFICANT CHANGE UP (ref 96–108)
CO2 SERPL-SCNC: 27 MMOL/L — SIGNIFICANT CHANGE UP (ref 22–31)
CREAT SERPL-MCNC: 0.73 MG/DL — SIGNIFICANT CHANGE UP (ref 0.5–1.3)
GLUCOSE SERPL-MCNC: 78 MG/DL — SIGNIFICANT CHANGE UP (ref 70–99)
HCT VFR BLD CALC: 45.3 % — SIGNIFICANT CHANGE UP (ref 39–50)
HGB BLD-MCNC: 15.1 G/DL — SIGNIFICANT CHANGE UP (ref 13–17)
MCHC RBC-ENTMCNC: 29 PG — SIGNIFICANT CHANGE UP (ref 27–34)
MCHC RBC-ENTMCNC: 33.3 GM/DL — SIGNIFICANT CHANGE UP (ref 32–36)
MCV RBC AUTO: 86.9 FL — SIGNIFICANT CHANGE UP (ref 80–100)
NRBC # BLD: 0 /100 WBCS — SIGNIFICANT CHANGE UP (ref 0–0)
PLATELET # BLD AUTO: 154 K/UL — SIGNIFICANT CHANGE UP (ref 150–400)
POTASSIUM SERPL-MCNC: 3.6 MMOL/L — SIGNIFICANT CHANGE UP (ref 3.5–5.3)
POTASSIUM SERPL-SCNC: 3.6 MMOL/L — SIGNIFICANT CHANGE UP (ref 3.5–5.3)
RBC # BLD: 5.21 M/UL — SIGNIFICANT CHANGE UP (ref 4.2–5.8)
RBC # FLD: 12.3 % — SIGNIFICANT CHANGE UP (ref 10.3–14.5)
SODIUM SERPL-SCNC: 139 MMOL/L — SIGNIFICANT CHANGE UP (ref 135–145)
WBC # BLD: 4.1 K/UL — SIGNIFICANT CHANGE UP (ref 3.8–10.5)
WBC # FLD AUTO: 4.1 K/UL — SIGNIFICANT CHANGE UP (ref 3.8–10.5)

## 2020-07-22 PROCEDURE — 70551 MRI BRAIN STEM W/O DYE: CPT

## 2020-07-22 PROCEDURE — 80048 BASIC METABOLIC PNL TOTAL CA: CPT

## 2020-07-22 PROCEDURE — 85730 THROMBOPLASTIN TIME PARTIAL: CPT

## 2020-07-22 PROCEDURE — 36415 COLL VENOUS BLD VENIPUNCTURE: CPT

## 2020-07-22 PROCEDURE — 80053 COMPREHEN METABOLIC PANEL: CPT

## 2020-07-22 PROCEDURE — 86769 SARS-COV-2 COVID-19 ANTIBODY: CPT

## 2020-07-22 PROCEDURE — 87635 SARS-COV-2 COVID-19 AMP PRB: CPT

## 2020-07-22 PROCEDURE — 99285 EMERGENCY DEPT VISIT HI MDM: CPT | Mod: 25

## 2020-07-22 PROCEDURE — 70544 MR ANGIOGRAPHY HEAD W/O DYE: CPT

## 2020-07-22 PROCEDURE — 85027 COMPLETE CBC AUTOMATED: CPT

## 2020-07-22 PROCEDURE — 82607 VITAMIN B-12: CPT

## 2020-07-22 PROCEDURE — 93005 ELECTROCARDIOGRAM TRACING: CPT

## 2020-07-22 PROCEDURE — 70548 MR ANGIOGRAPHY NECK W/DYE: CPT

## 2020-07-22 PROCEDURE — 84100 ASSAY OF PHOSPHORUS: CPT

## 2020-07-22 PROCEDURE — 80061 LIPID PANEL: CPT

## 2020-07-22 PROCEDURE — 70496 CT ANGIOGRAPHY HEAD: CPT

## 2020-07-22 PROCEDURE — 82746 ASSAY OF FOLIC ACID SERUM: CPT

## 2020-07-22 PROCEDURE — 84484 ASSAY OF TROPONIN QUANT: CPT

## 2020-07-22 PROCEDURE — 97162 PT EVAL MOD COMPLEX 30 MIN: CPT

## 2020-07-22 PROCEDURE — 71045 X-RAY EXAM CHEST 1 VIEW: CPT

## 2020-07-22 PROCEDURE — 85610 PROTHROMBIN TIME: CPT

## 2020-07-22 PROCEDURE — 70498 CT ANGIOGRAPHY NECK: CPT

## 2020-07-22 PROCEDURE — 83735 ASSAY OF MAGNESIUM: CPT

## 2020-07-22 PROCEDURE — 86900 BLOOD TYPING SEROLOGIC ABO: CPT

## 2020-07-22 PROCEDURE — 83036 HEMOGLOBIN GLYCOSYLATED A1C: CPT

## 2020-07-22 PROCEDURE — 84443 ASSAY THYROID STIM HORMONE: CPT

## 2020-07-22 PROCEDURE — 93306 TTE W/DOPPLER COMPLETE: CPT

## 2020-07-22 PROCEDURE — 86901 BLOOD TYPING SEROLOGIC RH(D): CPT

## 2020-07-22 PROCEDURE — 82962 GLUCOSE BLOOD TEST: CPT

## 2020-07-22 PROCEDURE — 93880 EXTRACRANIAL BILAT STUDY: CPT

## 2020-07-22 PROCEDURE — 86850 RBC ANTIBODY SCREEN: CPT

## 2020-07-22 RX ORDER — PANTOPRAZOLE SODIUM 20 MG/1
1 TABLET, DELAYED RELEASE ORAL
Qty: 30 | Refills: 0
Start: 2020-07-22 | End: 2020-08-20

## 2020-07-22 RX ORDER — ASPIRIN/CALCIUM CARB/MAGNESIUM 324 MG
1 TABLET ORAL
Qty: 30 | Refills: 0
Start: 2020-07-22 | End: 2020-08-20

## 2020-07-22 RX ORDER — ACETAMINOPHEN 500 MG
2 TABLET ORAL
Qty: 0 | Refills: 0 | DISCHARGE
Start: 2020-07-22

## 2020-07-22 RX ORDER — ENOXAPARIN SODIUM 100 MG/ML
40 INJECTION SUBCUTANEOUS DAILY
Refills: 0 | Status: DISCONTINUED | OUTPATIENT
Start: 2020-07-22 | End: 2020-07-22

## 2020-07-22 RX ORDER — ATORVASTATIN CALCIUM 80 MG/1
1 TABLET, FILM COATED ORAL
Qty: 30 | Refills: 0
Start: 2020-07-22 | End: 2020-08-20

## 2020-07-22 RX ORDER — SIMVASTATIN 20 MG/1
1 TABLET, FILM COATED ORAL
Qty: 0 | Refills: 0 | DISCHARGE

## 2020-07-22 RX ORDER — SENNA PLUS 8.6 MG/1
2 TABLET ORAL
Qty: 60 | Refills: 0
Start: 2020-07-22 | End: 2020-08-20

## 2020-07-22 RX ORDER — ALPRAZOLAM 0.25 MG
1 TABLET ORAL
Qty: 14 | Refills: 0
Start: 2020-07-22 | End: 2020-07-28

## 2020-07-22 RX ORDER — CLOPIDOGREL BISULFATE 75 MG/1
1 TABLET, FILM COATED ORAL
Qty: 30 | Refills: 0
Start: 2020-07-22 | End: 2020-08-20

## 2020-07-22 RX ADMIN — PANTOPRAZOLE SODIUM 40 MILLIGRAM(S): 20 TABLET, DELAYED RELEASE ORAL at 06:12

## 2020-07-22 RX ADMIN — CARBIDOPA AND LEVODOPA 3 CAPSULE(S): 25; 100 TABLET ORAL at 13:01

## 2020-07-22 RX ADMIN — ENOXAPARIN SODIUM 40 MILLIGRAM(S): 100 INJECTION SUBCUTANEOUS at 11:20

## 2020-07-22 RX ADMIN — ENOXAPARIN SODIUM 70 MILLIGRAM(S): 100 INJECTION SUBCUTANEOUS at 05:02

## 2020-07-22 RX ADMIN — POLYETHYLENE GLYCOL 3350 17 GRAM(S): 17 POWDER, FOR SOLUTION ORAL at 11:21

## 2020-07-22 RX ADMIN — Medication 650 MILLIGRAM(S): at 03:54

## 2020-07-22 RX ADMIN — Medication 81 MILLIGRAM(S): at 11:20

## 2020-07-22 RX ADMIN — CLOPIDOGREL BISULFATE 75 MILLIGRAM(S): 75 TABLET, FILM COATED ORAL at 11:20

## 2020-07-22 RX ADMIN — Medication 1 APPLICATION(S): at 05:04

## 2020-07-22 RX ADMIN — LOSARTAN POTASSIUM 25 MILLIGRAM(S): 100 TABLET, FILM COATED ORAL at 05:02

## 2020-07-22 RX ADMIN — CARBIDOPA AND LEVODOPA 3 CAPSULE(S): 25; 100 TABLET ORAL at 06:11

## 2020-07-22 RX ADMIN — Medication 650 MILLIGRAM(S): at 03:09

## 2020-07-22 NOTE — DISCHARGE NOTE NURSING/CASE MANAGEMENT/SOCIAL WORK - PATIENT PORTAL LINK FT
You can access the FollowMyHealth Patient Portal offered by [facility] by registering at the following website: [email or web address]. By joining [facility]’s FollowMyHealth portal, you will also be able to view your health information using other applications (apps) compatible with our system.

## 2020-07-22 NOTE — DISCHARGE NOTE PROVIDER - HOSPITAL COURSE
69 y/o male with PMH of HTN, HLD and Parkinson disease come to ED s with numbness to the left arm that started yesterday around 8pm. Patient thought it would get better but it did not so he came to the ED. Pt denies any recent trauma to neck. Pt denies any upper or lower extremity weakness, slurring speech, confusion blurred vison, chest pain, palpitations, fever, cough or any other acute complaints.     In ED, /65, HR 53         ICU consulted given CTA head/neck findings of possible right cervical ICA dissection. ED provider discussed case with neurosurgery, who advised against transfer and opted for medical management. Case discussed by MAR with neurologist Dr. Barrow, who recommended patient be monitored in ICU at this time for strict blood pressure monitoring, with goal MAP between 100-140mmHg. Patient seen and examined by myself in ED. Noted to be mildly irritable at this time and complains of generalized body pain as well as left sided weakness and headache. Denies neck pain or dizziness. Denies slurring of speech.        patient was on phenylephrine to maintain MAP > 100. Carotid dopplers were ordered but No evidence of dissection on doppler- it is likely that anatomical features of the patient (prominent calcification of neck cartilage) limited a detailed study of every part of the arteries. Patient was started on lovenox full dose until pt gets MRI/MRA head w/o cont and MRA neck w/ cont as recommended  by neurologist; 48 hours later pt was started on asa, plavix 7/19.     Aspirin 81mg daily & Clopidogrel 75mg daily for 6 months to prevent embolic strokes during recovery of dissection    - Additional anticoagulation with enoxaparin is not needed as per most recent guidelines    - Continue atorvastatin 40mg QHS    - Follow up with Neurology / Stroke, which can then arrange referral to Neurosurgery as needed            Things to follow:     MRI head/neck when downgraded    restarted BP medications when blood pressure is stable. currently on the soft side. 71 y/o male with PMH of HTN, HLD and Parkinson disease came to ED s with numbness to the left arm that started yesterday around 8pm. Patient thought it would get better but it did not so he came to the ED. Pt denied any recent trauma to neck, upper or lower extremity weakness, slurring speech, confusion blurred vison, chest pain, palpitations, fever, cough or any other acute complaints.     In ED, /65, HR 53         ICU was consulted given CTA head/neck findings of possible right cervical ICA dissection. ED provider discussed case with neurosurgery, who advised against transfer and opted for medical management. Patient was admitted to ICU for close monitoring of blood pressure with goal MAP between 100-140mmHg.         patient was on phenylephrine to maintain MAP > 100. Carotid dopplers were ordered but No evidence of dissection on doppler-  likely that anatomical features of the patient (prominent calcification of neck cartilage) limited a detailed study of every part of the arteries. Patient was started on lovenox full dose, 48 hours later pt was started on asa, plavix 7/19. MRI/MRA showed ICA dissection and MCA aneurysm. He was re evaluated by Dr Rachel who recommended  Aspirin 81mg daily & Clopidogrel 75mg daily for 6 months to prevent embolic strokes during recovery of dissection    - Additional anticoagulation with enoxaparin is not needed as per most recent guidelines, atorvastatin 40mg QHS. Patient will need to follow up with Neurology / Stroke, which can then arrange referral to Neurosurgery as needed            Given patient's improved clinical status and current hemodynamic stability, decision was made to discharge. Discussed with attending    Please refer to patient's complete medical chart with documents for a full hospital course, for this is only a brief summary.

## 2020-07-22 NOTE — DISCHARGE NOTE PROVIDER - PROVIDER TOKENS
PROVIDER:[TOKEN:[[id number]]] PROVIDER:[TOKEN:[[id number]]],PROVIDER:[TOKEN:[[id number]]],PROVIDER:[TOKEN:[[id number]]]

## 2020-07-22 NOTE — DISCHARGE NOTE NURSING/CASE MANAGEMENT/SOCIAL WORK - NSDCPEPTSTRK_GEN_ALL_CORE
Stroke education booklet/Need for follow up after discharge/Call 911 for stroke/Stroke support groups for patients, families, and friends/Stroke warning signs and symptoms/Signs and symptoms of stroke/Prescribed medications/Risk factors for stroke

## 2020-07-22 NOTE — DISCHARGE NOTE PROVIDER - NSDCCPCAREPLAN_GEN_ALL_CORE_FT
PRINCIPAL DISCHARGE DIAGNOSIS  Diagnosis: Carotid artery dissection  Assessment and Plan of Treatment:       SECONDARY DISCHARGE DIAGNOSES  Diagnosis: Middle cerebral artery aneurysm  Assessment and Plan of Treatment:     Diagnosis: HTN (hypertension)  Assessment and Plan of Treatment: HTN (hypertension)    Diagnosis: Parkinson disease  Assessment and Plan of Treatment: Parkinson disease PRINCIPAL DISCHARGE DIAGNOSIS  Diagnosis: Carotid artery dissection  Assessment and Plan of Treatment: You were found to have stroke like symptoms secondary to dissection in your internal carotid artery. It wasconfirmed by CT and MRI. We have started you on dual antiplatelet therapy brennen brooks need to take for atleast 6 months.   It is recommended to avoid high cholesterol food, control your blood pressure.   NIHSS score is 2 at the time of discharge  Please follow up with your PCP and neurologist outpatient      SECONDARY DISCHARGE DIAGNOSES  Diagnosis: Middle cerebral artery aneurysm  Assessment and Plan of Treatment: You were found to have an aneurysm in your blood vessel in the brain. It is recommended to keep your blood pressure in control ,.High blood pressure can lead to rupture. We consulted neurosurgeon who recommendedmedical optimization only. But you need to follow outpatient with neurosurgeon mentioned in the discharge papers for further workup  Take your medications regularly    Diagnosis: HTN (hypertension)  Assessment and Plan of Treatment: Your blood pressure was well-controlled during your admission. You were monitored in ICU for proper control asper stroke protocol. Continue with your current regimen of anti-hypertensive medications as mentioned in your discharge summary and ensure that you follow up with your primary care provider for further recommendations and monitoring.    Diagnosis: Parkinson disease  Assessment and Plan of Treatment: Shaan continue your home medications and follow up with your PCP and neurologist outpatient.   You vinh bartholomew from hlysiotherapy outpatient

## 2020-07-22 NOTE — DISCHARGE NOTE PROVIDER - NSDCMRMEDTOKEN_GEN_ALL_CORE_FT
benztropine 0.5 mg oral tablet: 1 tab(s) orally once a day (at bedtime)  losartan 25 mg oral tablet: 1 tab(s) orally once a day  Rytary 48.75 mg-195 mg oral capsule, extended release: 3 cap(s) orally 3 times a day  simvastatin 20 mg oral tablet: 1 tab(s) orally once a day (at bedtime) acetaminophen 325 mg oral tablet: 2 tab(s) orally every 6 hours, As needed, Temp greater or equal to 38C (100.4F), Mild Pain (1 - 3), Moderate Pain (4 - 6)  aspirin 81 mg oral tablet, chewable: 1 tab(s) orally once a day  atorvastatin 40 mg oral tablet: 1 tab(s) orally once a day (at bedtime)   benztropine 0.5 mg oral tablet: 1 tab(s) orally once a day (at bedtime)  clopidogrel 75 mg oral tablet: 1 tab(s) orally once a day  clotrimazole 1% topical cream: 1 application topically 2 times a day  losartan 25 mg oral tablet: 1 tab(s) orally once a day  pantoprazole 40 mg oral delayed release tablet: 1 tab(s) orally once a day (before a meal)  physical therapy: Apply topically to affected area once a day   Rytary 48.75 mg-195 mg oral capsule, extended release: 3 cap(s) orally 3 times a day  senna oral tablet: 2 tab(s) orally once a day (at bedtime)  Xanax 0.25 mg oral tablet: 1 tab(s) orally 2 times a day, As Needed -for agitation MDD:0.5mg

## 2020-07-22 NOTE — DISCHARGE NOTE PROVIDER - CARE PROVIDER_API CALL
Daniel Oneil)  Internal Medicine  [address] [place], [place] [address]  Phone: [phone]  Fax: [phone]  Follow Up Time: Daniel Oneil)  Internal Medicine  [address] [place] [address]  Phone: [phone]  Fax: [phone]  Follow Up Time:     Darrell Rachel)  Neurology  Epilepsy  [address] [facility] [address]  [place] [address]  Phone: [phone]  Follow Up Time:     Ross Baca  NEUROLOGY  [address] [place] [address]  Phone: [phone]  Fax: [phone]  Follow Up Time:

## 2020-07-22 NOTE — DISCHARGE NOTE PROVIDER - CARE PROVIDERS DIRECT ADDRESSES
,DirectAddress_Unknown ,DirectAddress_Unknown,[email or web address]

## 2020-07-22 NOTE — PROGRESS NOTE ADULT - PROVIDER SPECIALTY LIST ADULT
Critical Care
Internal Medicine
Neurology

## 2020-07-23 LAB — GLUCOSE BLDC GLUCOMTR-MCNC: 104 MG/DL — HIGH (ref 70–99)

## 2020-07-29 PROBLEM — I10 ESSENTIAL (PRIMARY) HYPERTENSION: Chronic | Status: ACTIVE | Noted: 2020-07-16

## 2020-07-29 PROBLEM — G20 PARKINSON'S DISEASE: Chronic | Status: ACTIVE | Noted: 2020-07-16

## 2020-07-29 PROBLEM — E78.49 OTHER HYPERLIPIDEMIA: Chronic | Status: ACTIVE | Noted: 2020-07-16

## 2020-08-04 ENCOUNTER — APPOINTMENT (OUTPATIENT)
Age: 70
End: 2020-08-04
Payer: MEDICARE

## 2020-08-04 DIAGNOSIS — Z78.9 OTHER SPECIFIED HEALTH STATUS: ICD-10-CM

## 2020-08-04 PROCEDURE — 99204 OFFICE O/P NEW MOD 45 MIN: CPT | Mod: 95

## 2020-08-04 RX ORDER — ASPIRIN ENTERIC COATED TABLETS 81 MG 81 MG/1
81 TABLET, DELAYED RELEASE ORAL
Refills: 0 | Status: ACTIVE | COMMUNITY

## 2020-08-04 RX ORDER — ALPRAZOLAM 0.25 MG/1
0.25 TABLET ORAL
Refills: 0 | Status: ACTIVE | COMMUNITY

## 2020-08-04 RX ORDER — PANTOPRAZOLE SODIUM 40 MG/1
40 TABLET, DELAYED RELEASE ORAL
Refills: 0 | Status: ACTIVE | COMMUNITY

## 2020-08-04 RX ORDER — SENNOSIDES 8.6 MG TABLETS 8.6 MG/1
TABLET ORAL
Refills: 0 | Status: ACTIVE | COMMUNITY

## 2020-08-04 RX ORDER — CLOPIDOGREL 75 MG/1
75 TABLET, FILM COATED ORAL
Refills: 0 | Status: ACTIVE | COMMUNITY

## 2020-08-04 RX ORDER — LOSARTAN POTASSIUM 25 MG/1
25 TABLET, FILM COATED ORAL
Refills: 0 | Status: ACTIVE | COMMUNITY

## 2020-08-04 RX ORDER — ACETAMINOPHEN 325 MG/1
325 TABLET ORAL
Refills: 0 | Status: ACTIVE | COMMUNITY

## 2020-08-04 RX ORDER — ATORVASTATIN CALCIUM 40 MG/1
40 TABLET, FILM COATED ORAL
Refills: 0 | Status: ACTIVE | COMMUNITY

## 2020-08-04 RX ORDER — CLOTRIMAZOLE 10 MG/G
1 CREAM TOPICAL
Refills: 0 | Status: ACTIVE | COMMUNITY

## 2020-08-04 NOTE — HISTORY OF PRESENT ILLNESS
[FreeTextEntry1] : Mr. Lau is a 70 year-old male with a PMH of PD (followed by Dr. Veliz) and HTN. He presents via telehealth with his daughter for a post-hospitalization evaluation. He was recently admitted to [facility] on 7/22/20 for neck pain (which began two years ago), facial droop, slurred speech, imbalance, left arm and leg weakness and loss of sensation and confusion. Most of his symptoms were transient, however, the neck pain and left arm numbness have persisted. Neuroimaging from [facility] was negative for stroke but CTA showed a dilated right cervical ICA dissection and a right MCA 3x2.5 mm saccular aneurysm. \par \par \par

## 2020-08-04 NOTE — REASON FOR VISIT
[Home] : at home, [unfilled] , at the time of the visit. [Medical Office: ([facility])___] : at the medical office located in  [Verbal consent obtained from patient] : the patient, [unfilled] [Consultation] : a consultation visit [Family Member] : family member

## 2020-08-04 NOTE — REVIEW OF SYSTEMS
[As Noted in HPI] : as noted in HPI [Numbness] : numbness [Tingling] : tingling [Abnormal Sensation] : an abnormal sensation [Difficulty Walking] : difficulty walking [Fever] : no fever [Change In Personality] : no personality change [Chills] : no chills [Facial Weakness] : no facial weakness [Confused or Disoriented] : no confusion [Arm Weakness] : no arm weakness [Hand Weakness] : no hand weakness [Leg Weakness] : no leg weakness [Seizures] : no convulsions [Eyesight Problems] : no eyesight problems [Dizziness] : no dizziness [Loss Of Hearing] : no hearing loss [Chest Pain] : no chest pain [de-identified] : Reports feeling imbalanced.  [Shortness Of Breath] : no shortness of breath

## 2020-08-04 NOTE — PHYSICAL EXAM
[General Appearance - Alert] : alert [General Appearance - In No Acute Distress] : in no acute distress [Impaired Insight] : insight and judgment were intact [Oriented To Time, Place, And Person] : oriented to person, place, and time [Place] : oriented to place [Person] : oriented to person [Time] : oriented to time [Concentration Intact] : normal concentrating ability [Fluency] : fluency intact [Comprehension] : comprehension intact

## 2020-08-04 NOTE — DISCUSSION/SUMMARY
[FreeTextEntry1] : Mr. Lau is a 70 year-old male with a PMH of PD (followed by Dr. Veliz) and HTN. He presents via telehealth with his daughter for a post-hospitalization evaluation. [facility] and weakness[facility] included a right cervical ICA dissection and 3x2.5mm saccular MCA aneurysm. We discussed that the dissection is chronic and may or may not have contributed to his symptoms. ASA should be sufficient. Plan to continue ASA and Plavix x 3 weeks post discharge date and then ASA indefinitely per POINT protocol. We also discussed the risk of having an aneurysm the fact that his is small and carries a low lifetime risk that is lower than his risk for treatment. Plan to perform an MRA head in one year and see me in the office in one month. All of their questions and concerns were addressed. \par

## 2020-08-27 ENCOUNTER — EMERGENCY (EMERGENCY)
Facility: HOSPITAL | Age: 70
LOS: 1 days | Discharge: HOME | End: 2020-08-27
Attending: STUDENT IN AN ORGANIZED HEALTH CARE EDUCATION/TRAINING PROGRAM
Payer: MEDICARE

## 2020-08-27 VITALS
HEART RATE: 64 BPM | TEMPERATURE: 99 F | WEIGHT: 149.91 LBS | HEIGHT: 67 IN | OXYGEN SATURATION: 97 % | DIASTOLIC BLOOD PRESSURE: 110 MMHG | RESPIRATION RATE: 18 BRPM | SYSTOLIC BLOOD PRESSURE: 184 MMHG

## 2020-08-27 VITALS — DIASTOLIC BLOOD PRESSURE: 75 MMHG | SYSTOLIC BLOOD PRESSURE: 157 MMHG | HEART RATE: 57 BPM

## 2020-08-27 LAB
ALBUMIN SERPL ELPH-MCNC: 4.1 G/DL — SIGNIFICANT CHANGE UP (ref 3.3–5)
ALP SERPL-CCNC: 48 U/L — SIGNIFICANT CHANGE UP (ref 40–120)
ALT FLD-CCNC: <5 U/L — LOW (ref 10–45)
ANION GAP SERPL CALC-SCNC: 12 MMOL/L — SIGNIFICANT CHANGE UP (ref 5–17)
APTT BLD: 27.9 SEC — SIGNIFICANT CHANGE UP (ref 27.5–35.5)
AST SERPL-CCNC: 19 U/L — SIGNIFICANT CHANGE UP (ref 10–40)
BASOPHILS # BLD AUTO: 0.03 K/UL — SIGNIFICANT CHANGE UP (ref 0–0.2)
BASOPHILS NFR BLD AUTO: 0.4 % — SIGNIFICANT CHANGE UP (ref 0–2)
BILIRUB SERPL-MCNC: 0.5 MG/DL — SIGNIFICANT CHANGE UP (ref 0.2–1.2)
BUN SERPL-MCNC: 12 MG/DL — SIGNIFICANT CHANGE UP (ref 7–23)
CALCIUM SERPL-MCNC: 9.7 MG/DL — SIGNIFICANT CHANGE UP (ref 8.4–10.5)
CHLORIDE SERPL-SCNC: 103 MMOL/L — SIGNIFICANT CHANGE UP (ref 96–108)
CO2 SERPL-SCNC: 25 MMOL/L — SIGNIFICANT CHANGE UP (ref 22–31)
CREAT SERPL-MCNC: 0.75 MG/DL — SIGNIFICANT CHANGE UP (ref 0.5–1.3)
EOSINOPHIL # BLD AUTO: 0.11 K/UL — SIGNIFICANT CHANGE UP (ref 0–0.5)
EOSINOPHIL NFR BLD AUTO: 1.4 % — SIGNIFICANT CHANGE UP (ref 0–6)
GLUCOSE SERPL-MCNC: 103 MG/DL — HIGH (ref 70–99)
HCT VFR BLD CALC: 45.7 % — SIGNIFICANT CHANGE UP (ref 39–50)
HGB BLD-MCNC: 15.2 G/DL — SIGNIFICANT CHANGE UP (ref 13–17)
IMM GRANULOCYTES NFR BLD AUTO: 0.3 % — SIGNIFICANT CHANGE UP (ref 0–1.5)
INR BLD: 1.1 RATIO — SIGNIFICANT CHANGE UP (ref 0.88–1.16)
LYMPHOCYTES # BLD AUTO: 1.4 K/UL — SIGNIFICANT CHANGE UP (ref 1–3.3)
LYMPHOCYTES # BLD AUTO: 18.3 % — SIGNIFICANT CHANGE UP (ref 13–44)
MAGNESIUM SERPL-MCNC: 2.2 MG/DL — SIGNIFICANT CHANGE UP (ref 1.6–2.6)
MCHC RBC-ENTMCNC: 29.1 PG — SIGNIFICANT CHANGE UP (ref 27–34)
MCHC RBC-ENTMCNC: 33.3 GM/DL — SIGNIFICANT CHANGE UP (ref 32–36)
MCV RBC AUTO: 87.5 FL — SIGNIFICANT CHANGE UP (ref 80–100)
MONOCYTES # BLD AUTO: 0.45 K/UL — SIGNIFICANT CHANGE UP (ref 0–0.9)
MONOCYTES NFR BLD AUTO: 5.9 % — SIGNIFICANT CHANGE UP (ref 2–14)
NEUTROPHILS # BLD AUTO: 5.64 K/UL — SIGNIFICANT CHANGE UP (ref 1.8–7.4)
NEUTROPHILS NFR BLD AUTO: 73.7 % — SIGNIFICANT CHANGE UP (ref 43–77)
NRBC # BLD: 0 /100 WBCS — SIGNIFICANT CHANGE UP (ref 0–0)
PHOSPHATE SERPL-MCNC: 3.7 MG/DL — SIGNIFICANT CHANGE UP (ref 2.5–4.5)
PLATELET # BLD AUTO: 182 K/UL — SIGNIFICANT CHANGE UP (ref 150–400)
POTASSIUM SERPL-MCNC: 4 MMOL/L — SIGNIFICANT CHANGE UP (ref 3.5–5.3)
POTASSIUM SERPL-SCNC: 4 MMOL/L — SIGNIFICANT CHANGE UP (ref 3.5–5.3)
PROT SERPL-MCNC: 6.7 G/DL — SIGNIFICANT CHANGE UP (ref 6–8.3)
PROTHROM AB SERPL-ACNC: 13 SEC — SIGNIFICANT CHANGE UP (ref 10.6–13.6)
RBC # BLD: 5.22 M/UL — SIGNIFICANT CHANGE UP (ref 4.2–5.8)
RBC # FLD: 12.3 % — SIGNIFICANT CHANGE UP (ref 10.3–14.5)
SODIUM SERPL-SCNC: 140 MMOL/L — SIGNIFICANT CHANGE UP (ref 135–145)
WBC # BLD: 7.65 K/UL — SIGNIFICANT CHANGE UP (ref 3.8–10.5)
WBC # FLD AUTO: 7.65 K/UL — SIGNIFICANT CHANGE UP (ref 3.8–10.5)

## 2020-08-27 PROCEDURE — 99284 EMERGENCY DEPT VISIT MOD MDM: CPT

## 2020-08-27 PROCEDURE — 85730 THROMBOPLASTIN TIME PARTIAL: CPT

## 2020-08-27 PROCEDURE — 99284 EMERGENCY DEPT VISIT MOD MDM: CPT | Mod: 25

## 2020-08-27 PROCEDURE — 85610 PROTHROMBIN TIME: CPT

## 2020-08-27 PROCEDURE — 84100 ASSAY OF PHOSPHORUS: CPT

## 2020-08-27 PROCEDURE — 93970 EXTREMITY STUDY: CPT | Mod: 26

## 2020-08-27 PROCEDURE — 83735 ASSAY OF MAGNESIUM: CPT

## 2020-08-27 PROCEDURE — 80053 COMPREHEN METABOLIC PANEL: CPT

## 2020-08-27 PROCEDURE — 93970 EXTREMITY STUDY: CPT

## 2020-08-27 PROCEDURE — 85027 COMPLETE CBC AUTOMATED: CPT

## 2020-08-27 RX ORDER — IBUPROFEN 200 MG
600 TABLET ORAL ONCE
Refills: 0 | Status: COMPLETED | OUTPATIENT
Start: 2020-08-27 | End: 2020-08-27

## 2020-08-27 RX ORDER — ACETAMINOPHEN 500 MG
650 TABLET ORAL ONCE
Refills: 0 | Status: COMPLETED | OUTPATIENT
Start: 2020-08-27 | End: 2020-08-27

## 2020-08-27 RX ADMIN — Medication 650 MILLIGRAM(S): at 19:00

## 2020-08-27 NOTE — ED PROVIDER NOTE - NSFOLLOWUPCLINICS_GEN_ALL_ED_FT
[facility] - [place]  Dermatology  [address], [address]  [place] [address]  Phone: [phone]  Fax: [phone]  Follow Up Time: Routine

## 2020-08-27 NOTE — ED PROVIDER NOTE - PROGRESS NOTE DETAILS
vein was hurting him started yesterday and bp elevated, so pt was sent to er, L leg he feels burning pain, pt was taking metoprolol 50 mg BID and losartan 25 mg daily pt reassessed improvement in pain s/p medicaitn, no dvt of note gu exam performed because requistion from ultrasound states that the patient has testicular pain, unable to find this documentation when pt returned from ultrasound he denied testicular pain and no tenderness no  exam, no swelling,

## 2020-08-27 NOTE — ED PROVIDER NOTE - NS ED ROS FT
Constitutional: no fevers no chills.   no testicular pain, no dysuria, no urinary frequency no urinary urgency. no cough, no shortness of breath, no chest pain. no hx of blood clots

## 2020-08-27 NOTE — ED PROVIDER NOTE - OBJECTIVE STATEMENT
70 M w/ 2 days of bilateral leg pain along the superficial veins he is noticing increasing prominence of the veins which is what prompted todays visit, no fevers, no chills no nausea no vomiting, progressively worsening, nothing makes it better,   of note no testicular pain, no dysuria, no urinary frequency no urinary urgency. no cough, no shortness of breath, no chest pain. no hx of blood clots, recently hospitalized at [facility] for stroke, pt is coming from home.

## 2020-08-27 NOTE — ED PROVIDER NOTE - NSFOLLOWUPINSTRUCTIONS_ED_ALL_ED_FT
You were seen in the emergency department today and we recommend that you return if you develop chest pain, abdominal pain, shortness of breath, lightheadedness, vomiting, leg swelling, fever, headache, slurred speech, weakness or blurry vision.     We did an ultrasound of the lower extremities and you didn't have a blood clot. We recommend that you rest, ice and take tylenol(650 mg up to three times a day)/motrin(600 mg up to three times a day) for your symptoms.     If you still have persistent pain after 5-7 days after the injury please be re-evaluated as there could be a more severe diagnosis. If you develop numbness, weakness, change in color of your extremities (turn blue or white), worsening pain please seek immediate medical care for repeat evaluation.    Additionally please follow up with your primary care doctor over the next 2-3 days for blood pressure check and to review your bloodwork to ensure no additional tests, imaging or bloodwork, need to be performed.

## 2020-08-27 NOTE — ED PROVIDER NOTE - PATIENT PORTAL LINK FT
You can access the FollowMyHealth Patient Portal offered by [facility] by registering at the following website: [email or web address]. By joining [employer]’s FollowMyHealth portal, you will also be able to view your health information using other applications (apps) compatible with our system.

## 2020-08-27 NOTE — ED PROVIDER NOTE - CARE PROVIDER_API CALL
Trace Colby  SURGERY  [address] [place] [address]  Phone: [phone]  Fax: [phone]  Follow Up Time: 7-10 Days

## 2020-08-27 NOTE — ED ADULT NURSE NOTE - CHPI ED NUR SYMPTOMS NEG
no chills/no dizziness/no fever/no weakness/no decreased eating/drinking/no nausea/no tingling/no vomiting

## 2020-08-27 NOTE — ED ADULT NURSE NOTE - OBJECTIVE STATEMENT
70 year old male presents to ED via walk-in complaining of lower leg pain. PMH Parkinson's and HTN. Pt primarily Macedonian speaking, states he understands English, offered interpretation services and declines. Pt states that this morning started feeling hot/pinching sensation in left lower leg. Pt endorses pain throughout entire body. Pain relieved in leg with "hot water and salt". Upon assessment A&O x4 and well appearing. Left leg not red, warm, tender or swollen. No edema noted to bilateral lower extremities. Reports no difficulty with ambulation and no recent falls. VS as documented. Pt waiting for results of VA Duplex Scan. Medicated for pain/discomfort. Bed locked and lowered. Comfort and safety measures maintained.

## 2020-08-27 NOTE — ED PROVIDER NOTE - CLINICAL SUMMARY MEDICAL DECISION MAKING FREE TEXT BOX
LEG PAIN X2 DAYS nonradiating pain in the bilateral calf. along the veins suspect varicose veins 2+ DP and PT pulse bilaterally, no back pain no numbness. no weakness, pt is ambulatory w/out difficulty initially family concerned regarding elevated BP, no headache no vision changes, stable for dc LEG PAIN X2 DAYS nonradiating pain in the bilateral calf. along the veins suspect varicose veins 2+ DP and PT pulse bilaterally, no back pain no numbness. no weakness, pt is ambulatory w/out difficulty initially family concerned regarding elevated BP, no headache no vision changes, stable for dc    rash in the arm pits as well no warmth no exudate

## 2020-09-28 NOTE — ED ADULT NURSE NOTE - NS ED NURSE LEVEL OF CONSCIOUSNESS MENTAL STATUS
Called patient today of due urine STI screening (CI).  Left general message on verified voicemail. Order placed.   Cooperative/Awake/Alert

## 2021-07-14 NOTE — ED PROVIDER NOTE - NS ED SCRIBE STATEMENT
Attending Benzoyl Peroxide Counseling: Patient counseled that medicine may cause skin irritation and bleach clothing.  In the event of skin irritation, the patient was advised to reduce the amount of the drug applied or use it less frequently.   The patient verbalized understanding of the proper use and possible adverse effects of benzoyl peroxide.  All of the patient's questions and concerns were addressed.

## 2021-09-02 NOTE — ED ADULT NURSE NOTE - CAS EDN DISCHARGE INTERVENTIONS
S: Very pleasant 38-year-old female presents today after being seen 2 days ago in urgent care and given prednisone and Bactrim for sinus infection.  She had emesis twice before coming here and once in the room that she associates with treatment.  ROS: Negative for fever.  Negative for diarrhea.  Negative for rashes.  PMH: Positive for C. difficile colitis    Meds: Albuterol, Ativan, Singulair, Bactrim and prednisone    O: Blood pressure 132/93, temperature 99.4, weight 189 pulse 96  NAD  HEENT-  --TMs clear  --Sclera and conjunctiva non-injected  --Pharynx non-erythematous  --No rhinorrhea  --Mild pain to palpation over the frontal sinuses bilaterally  Neck-  --Supple, no meningeal signs  --No cervical lymphadenopathy  Lungs--  --No adventitious sounds  Heart-  --Regular rate and rhythm  Skin-  --Pink and dry  While in the exam room patient had 1 large emesis.    A: Acute gastroenteritis  Sinusitis    P: Hold Bactrim and prednisone  Zofran ODT 4 mg every 8 hours as needed nausea or vomiting  Discussed oral rehydration therapy  Resume Bactrim when stomach is settled  Recommend sinus irrigation  Return if getting worse  
Arm band on/IV intact

## 2022-03-28 ENCOUNTER — INPATIENT (INPATIENT)
Facility: HOSPITAL | Age: 72
LOS: 2 days | Discharge: HOME | DRG: 91 | End: 2022-03-31
Attending: PSYCHIATRY & NEUROLOGY | Admitting: PSYCHIATRY & NEUROLOGY
Payer: MEDICARE

## 2022-03-28 VITALS
OXYGEN SATURATION: 96 % | RESPIRATION RATE: 18 BRPM | HEIGHT: 67 IN | TEMPERATURE: 98 F | SYSTOLIC BLOOD PRESSURE: 128 MMHG | HEART RATE: 73 BPM | DIASTOLIC BLOOD PRESSURE: 71 MMHG | WEIGHT: 154.98 LBS

## 2022-03-28 DIAGNOSIS — G25.71 DRUG INDUCED AKATHISIA: ICD-10-CM

## 2022-03-28 LAB
ALBUMIN SERPL ELPH-MCNC: 4.4 G/DL — SIGNIFICANT CHANGE UP (ref 3.3–5)
ALP SERPL-CCNC: 55 U/L — SIGNIFICANT CHANGE UP (ref 40–120)
ALT FLD-CCNC: 5 U/L — LOW (ref 10–45)
ANION GAP SERPL CALC-SCNC: 14 MMOL/L — SIGNIFICANT CHANGE UP (ref 5–17)
AST SERPL-CCNC: 39 U/L — SIGNIFICANT CHANGE UP (ref 10–40)
BASOPHILS # BLD AUTO: 0.03 K/UL — SIGNIFICANT CHANGE UP (ref 0–0.2)
BASOPHILS NFR BLD AUTO: 0.3 % — SIGNIFICANT CHANGE UP (ref 0–2)
BILIRUB SERPL-MCNC: 0.4 MG/DL — SIGNIFICANT CHANGE UP (ref 0.2–1.2)
BUN SERPL-MCNC: 14 MG/DL — SIGNIFICANT CHANGE UP (ref 7–23)
CALCIUM SERPL-MCNC: 9.4 MG/DL — SIGNIFICANT CHANGE UP (ref 8.4–10.5)
CHLORIDE SERPL-SCNC: 99 MMOL/L — SIGNIFICANT CHANGE UP (ref 96–108)
CK SERPL-CCNC: 865 U/L — HIGH (ref 30–200)
CO2 SERPL-SCNC: 24 MMOL/L — SIGNIFICANT CHANGE UP (ref 22–31)
CREAT SERPL-MCNC: 0.61 MG/DL — SIGNIFICANT CHANGE UP (ref 0.5–1.3)
EGFR: 103 ML/MIN/1.73M2 — SIGNIFICANT CHANGE UP
EOSINOPHIL # BLD AUTO: 0.11 K/UL — SIGNIFICANT CHANGE UP (ref 0–0.5)
EOSINOPHIL NFR BLD AUTO: 1.2 % — SIGNIFICANT CHANGE UP (ref 0–6)
GLUCOSE SERPL-MCNC: 127 MG/DL — HIGH (ref 70–99)
HCT VFR BLD CALC: 43.3 % — SIGNIFICANT CHANGE UP (ref 39–50)
HGB BLD-MCNC: 14.3 G/DL — SIGNIFICANT CHANGE UP (ref 13–17)
IMM GRANULOCYTES NFR BLD AUTO: 0.4 % — SIGNIFICANT CHANGE UP (ref 0–1.5)
LYMPHOCYTES # BLD AUTO: 1.54 K/UL — SIGNIFICANT CHANGE UP (ref 1–3.3)
LYMPHOCYTES # BLD AUTO: 17.2 % — SIGNIFICANT CHANGE UP (ref 13–44)
MCHC RBC-ENTMCNC: 29.2 PG — SIGNIFICANT CHANGE UP (ref 27–34)
MCHC RBC-ENTMCNC: 33 GM/DL — SIGNIFICANT CHANGE UP (ref 32–36)
MCV RBC AUTO: 88.4 FL — SIGNIFICANT CHANGE UP (ref 80–100)
MONOCYTES # BLD AUTO: 0.67 K/UL — SIGNIFICANT CHANGE UP (ref 0–0.9)
MONOCYTES NFR BLD AUTO: 7.5 % — SIGNIFICANT CHANGE UP (ref 2–14)
NEUTROPHILS # BLD AUTO: 6.57 K/UL — SIGNIFICANT CHANGE UP (ref 1.8–7.4)
NEUTROPHILS NFR BLD AUTO: 73.4 % — SIGNIFICANT CHANGE UP (ref 43–77)
NRBC # BLD: 0 /100 WBCS — SIGNIFICANT CHANGE UP (ref 0–0)
PLATELET # BLD AUTO: 201 K/UL — SIGNIFICANT CHANGE UP (ref 150–400)
POTASSIUM SERPL-MCNC: 4 MMOL/L — SIGNIFICANT CHANGE UP (ref 3.5–5.3)
POTASSIUM SERPL-SCNC: 4 MMOL/L — SIGNIFICANT CHANGE UP (ref 3.5–5.3)
PROT SERPL-MCNC: 7.1 G/DL — SIGNIFICANT CHANGE UP (ref 6–8.3)
RBC # BLD: 4.9 M/UL — SIGNIFICANT CHANGE UP (ref 4.2–5.8)
RBC # FLD: 12.7 % — SIGNIFICANT CHANGE UP (ref 10.3–14.5)
SODIUM SERPL-SCNC: 137 MMOL/L — SIGNIFICANT CHANGE UP (ref 135–145)
WBC # BLD: 8.96 K/UL — SIGNIFICANT CHANGE UP (ref 3.8–10.5)
WBC # FLD AUTO: 8.96 K/UL — SIGNIFICANT CHANGE UP (ref 3.8–10.5)

## 2022-03-28 PROCEDURE — 99285 EMERGENCY DEPT VISIT HI MDM: CPT

## 2022-03-28 RX ORDER — ALPRAZOLAM 0.25 MG
1 TABLET ORAL
Qty: 0 | Refills: 0 | DISCHARGE

## 2022-03-28 RX ORDER — PANTOPRAZOLE SODIUM 20 MG/1
1 TABLET, DELAYED RELEASE ORAL
Qty: 0 | Refills: 0 | DISCHARGE

## 2022-03-28 RX ORDER — BENZTROPINE MESYLATE 1 MG
1 TABLET ORAL
Qty: 0 | Refills: 0 | DISCHARGE

## 2022-03-28 RX ORDER — ASPIRIN/CALCIUM CARB/MAGNESIUM 324 MG
81 TABLET ORAL DAILY
Refills: 0 | Status: DISCONTINUED | OUTPATIENT
Start: 2022-03-28 | End: 2022-03-31

## 2022-03-28 RX ORDER — ENOXAPARIN SODIUM 100 MG/ML
40 INJECTION SUBCUTANEOUS EVERY 24 HOURS
Refills: 0 | Status: DISCONTINUED | OUTPATIENT
Start: 2022-03-28 | End: 2022-03-31

## 2022-03-28 RX ORDER — PANTOPRAZOLE SODIUM 20 MG/1
40 TABLET, DELAYED RELEASE ORAL
Refills: 0 | Status: DISCONTINUED | OUTPATIENT
Start: 2022-03-28 | End: 2022-03-31

## 2022-03-28 RX ORDER — BENZTROPINE MESYLATE 1 MG
0.5 TABLET ORAL AT BEDTIME
Refills: 0 | Status: DISCONTINUED | OUTPATIENT
Start: 2022-03-28 | End: 2022-03-31

## 2022-03-28 RX ORDER — BENZTROPINE MESYLATE 1 MG
0 TABLET ORAL
Qty: 0 | Refills: 0 | DISCHARGE

## 2022-03-28 RX ORDER — SIMVASTATIN 20 MG/1
1 TABLET, FILM COATED ORAL
Qty: 0 | Refills: 0 | DISCHARGE

## 2022-03-28 RX ORDER — LOSARTAN POTASSIUM 100 MG/1
100 TABLET, FILM COATED ORAL DAILY
Refills: 0 | Status: DISCONTINUED | OUTPATIENT
Start: 2022-03-28 | End: 2022-03-31

## 2022-03-28 RX ORDER — SODIUM CHLORIDE 9 MG/ML
1000 INJECTION INTRAMUSCULAR; INTRAVENOUS; SUBCUTANEOUS
Refills: 0 | Status: DISCONTINUED | OUTPATIENT
Start: 2022-03-28 | End: 2022-03-31

## 2022-03-28 RX ORDER — LOSARTAN POTASSIUM 100 MG/1
1 TABLET, FILM COATED ORAL
Qty: 0 | Refills: 0 | DISCHARGE

## 2022-03-28 RX ORDER — ALPRAZOLAM 0.25 MG
0.25 TABLET ORAL
Refills: 0 | Status: DISCONTINUED | OUTPATIENT
Start: 2022-03-28 | End: 2022-03-31

## 2022-03-28 RX ORDER — SIMVASTATIN 20 MG/1
0 TABLET, FILM COATED ORAL
Qty: 0 | Refills: 0 | DISCHARGE

## 2022-03-28 RX ORDER — METOPROLOL TARTRATE 50 MG
3 TABLET ORAL
Qty: 0 | Refills: 0 | DISCHARGE

## 2022-03-28 RX ORDER — CARBIDOPA AND LEVODOPA 25; 100 MG/1; MG/1
3 TABLET ORAL
Qty: 0 | Refills: 0 | DISCHARGE

## 2022-03-28 RX ORDER — QUETIAPINE FUMARATE 200 MG/1
1 TABLET, FILM COATED ORAL
Qty: 0 | Refills: 0 | DISCHARGE

## 2022-03-28 RX ORDER — CARBIDOPA AND LEVODOPA 25; 100 MG/1; MG/1
3 TABLET ORAL THREE TIMES A DAY
Refills: 0 | Status: DISCONTINUED | OUTPATIENT
Start: 2022-03-28 | End: 2022-03-29

## 2022-03-28 RX ORDER — METOPROLOL TARTRATE 50 MG
100 TABLET ORAL AT BEDTIME
Refills: 0 | Status: DISCONTINUED | OUTPATIENT
Start: 2022-03-28 | End: 2022-03-31

## 2022-03-28 RX ORDER — METOPROLOL TARTRATE 50 MG
0 TABLET ORAL
Qty: 0 | Refills: 0 | DISCHARGE

## 2022-03-28 RX ORDER — SIMVASTATIN 20 MG/1
10 TABLET, FILM COATED ORAL AT BEDTIME
Refills: 0 | Status: DISCONTINUED | OUTPATIENT
Start: 2022-03-28 | End: 2022-03-31

## 2022-03-28 RX ORDER — METOPROLOL TARTRATE 50 MG
50 TABLET ORAL DAILY
Refills: 0 | Status: DISCONTINUED | OUTPATIENT
Start: 2022-03-29 | End: 2022-03-31

## 2022-03-28 RX ORDER — QUETIAPINE FUMARATE 200 MG/1
25 TABLET, FILM COATED ORAL AT BEDTIME
Refills: 0 | Status: DISCONTINUED | OUTPATIENT
Start: 2022-03-28 | End: 2022-03-31

## 2022-03-28 RX ADMIN — SIMVASTATIN 10 MILLIGRAM(S): 20 TABLET, FILM COATED ORAL at 22:28

## 2022-03-28 RX ADMIN — QUETIAPINE FUMARATE 25 MILLIGRAM(S): 200 TABLET, FILM COATED ORAL at 22:28

## 2022-03-28 RX ADMIN — Medication 0.5 MILLIGRAM(S): at 22:28

## 2022-03-28 NOTE — H&P ADULT - NSHPLABSRESULTS_GEN_ALL_CORE
.  LABS:                         14.3   8.96  )-----------( 201      ( 28 Mar 2022 20:48 )             43.3     03-28    137  |  99  |  14  ----------------------------<  127<H>  4.0   |  24  |  0.61    Ca    9.4      28 Mar 2022 20:48    TPro  7.1  /  Alb  4.4  /  TBili  0.4  /  DBili  x   /  AST  39  /  ALT  5<L>  /  AlkPhos  55  03-28              RADIOLOGY, EKG & ADDITIONAL TESTS: Reviewed.

## 2022-03-28 NOTE — H&P ADULT - ASSESSMENT
72 y.o. [place] speaking M with PMH of Parkinson's and HLD presented to [facility] ED on 3/28 due to worsening akathesia and psychosis related to Parkinson's disease. Dr. Veliz (outpt neurologist at [facility]) requesting inpt eval with movement disorder input as well as psychiatry. Hallucinations involve seeing people who are not physically present. Neuro exam complicated by motion from akathesia and oral dyskinesia (less grimace, involving more mouth and tongue than expressions of face). Grossly non-focal.    Impression: Chronic, worsening akathesia, oral dyskinesia, and hallucination of unclear etiology. Localization could be basal ganglia. Most likely dx include Parkinson's disease    Plan  [] Admit to general neurology  [] Will contact [facility] movement disorder provider in AM for further input on titration of Rytary  [] C/w home medications including Rytary (pt will take home med, wife left at bedside)  [] Neurocheck and vital per unit protocol  [] Fall precaution    Case discussed with general neurology attending Dr. Grace  Case to be seen in AM with in-house general neurology attending

## 2022-03-28 NOTE — ED ADULT NURSE NOTE - OBJECTIVE STATEMENT
Pt is 72y M PMH Parkinsons c/o increased tremors, lip smacking, and decreased sleep since starting new Parkinsons medication (Ongentys) on Friday. Pt reports taking medication as prescribed x3 days and stopped it today. Pt was told to come to ED by Neurologist. Pt is 72y M PMH Parkinsons c/o increased tremors, lip smacking, and decreased sleep since starting new Parkinsons medication (Ongentys) on Friday. Pt reports taking medication as prescribed x3 days and stopped it today. Pt was told to come to ED by Neurologist. Pt presents to ED w/ tremors, lip smacking. Pt denies fever, chills, cough, sob, NVD, chest pain, abd pain. Pt A&Ox3, neuro status intact, breathing unlabored and spontaneous, abd soft nontender nondistended. Pt resting in stretcher, bed in lowest position, primarily Azeri speaking, wife at bedside able to translate, aware of plan of care. Comfort and safety measures maintained.

## 2022-03-28 NOTE — ED PROVIDER NOTE - OBJECTIVE STATEMENT
71 yo M with hx of Parkinsons, htn, hld presents with 3 days of significant increase in akathisia, and tardive dyskinesia. As per wife, patient was started ion Opicapone on Friday and developed lip smacking and restlessness with uncontrolled arm and leg movement since on Saturday. Denies falls, headache, other new medications or confusion. Patient has been unable to sleep since then. No fevers or chills.

## 2022-03-28 NOTE — ED PROVIDER NOTE - ATTENDING CONTRIBUTION TO CARE
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 will need to dw neuro regarding meds, just started opicapone

## 2022-03-28 NOTE — ED PROVIDER NOTE - CLINICAL SUMMARY MEDICAL DECISION MAKING FREE TEXT BOX
73 yo M with hx of Parkinsons, htn, hld presents with 3 days of significant increase in akathisia, and tardive dyskinesia. VS WNL. A and Ox3. No focal sensory or motor deficits, normal CN exam. Significant tardive dyskinesia and akathisia noted. Possible medication side effect. Will consider benadryl. Neurology consulted for likely medication adjustment.

## 2022-03-28 NOTE — H&P ADULT - HISTORY OF PRESENT ILLNESS
72 y.o. M with PMH of Parkinson's and HLD presented to [facility] ED on 3/28 due to worsening movements. Wife was at bedside to provide collateral.     Dr. Veliz (neurologist at [place]) requested evaluation per neurology team in-house and for possible adjustment of medications.     Of note, pt came to [facility] in 7/2020 and was seen by Dr. Baca in 2020 for multiple chief complaints such as imbalance, slurred speech, LUE numbness, and neck pain. 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 At that time, findings were thought to be chronic and not contributed to his symptoms. Was placed on CHANCE protocol and continued with baby ASA 81 mg.  72 y.o. Danish speaking M with PMH of Parkinson's and HLD presented to [facility] ED on 3/28 due to worsening movements. Wife was at bedside to provide collateral. Over the past two months, pt has been having worsening Parkinsonian movements in all extremities. Also was having intermittent hallucinations of seeing people who were not there physically. At baseline, pt was able to walk without much difficulty and without walker/cane & does have some mouth and tongue movements. However movements in extremities would not prevent pt from performing ADLs. Visited Dr. Veliz, neurologist who managed Parkinson's, was increased on Rytary to current dose (61.25/245 mg) over a month and on last visit on 3/25, pt was started on new medications Opicapone 50 mg qd. Per wife, he took it on 3/25-3/26. However, starting 3/27, pt was having significantly worsened and nonstop circling and whirling around movement of the arms and legs. He also bit his tongue due to worsening oral movements. He could not stay still in bed and could not sleep. His eyes would turn read due to lack of sleep. Wife contacted Dr. Veliz (neurologist, [place]) and he requested evaluation per neurology team in-house and for possible adjustment of medications.     Of note, pt came to [facility] in 7/2020 and was seen by Dr. Baca in 2020 for multiple chief complaints such as imbalance, slurred speech, LUE numbness, and neck pain. 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 At that time, findings were thought to be chronic and not contributed to his symptoms. Was placed on CHANCE protocol and continued with baby ASA 81 mg.

## 2022-03-28 NOTE — ED PROVIDER NOTE - PHYSICAL EXAMINATION
Gen: AAOx3, non-toxic  Head: NCAT  HEENT: EOMI, oral mucosa moist, normal conjunctiva  Lung: CTAB, no respiratory distress, no wheezes/rhonchi/rales B/L, speaking in full sentences  CV: RRR, no murmurs, rubs or gallops  Abd: soft, NTND, no guarding, no CVA tenderness  MSK: no visible deformities  Neuro: No focal sensory or motor deficits, normal CN exam. Significant tardive dyskinesia and akathisia noted.   Skin: Warm, well perfused, no rash  Psych: normal affect.

## 2022-03-28 NOTE — H&P ADULT - NSHPPHYSICALEXAM_GEN_ALL_CORE
MS: Eyes open. Responds to verbal stimuli. Alert and oriented to self, situation only. Thought [facility] for location and could not elaborate on why he was in ED. Follows all commands.   Language: Mild dysarthria and hypophonic. Unclear aphasia as asks wife to answer most of questions due to tongue and mouth movements  CN: PERRL (3mm b/l). VFF. EOMI. Mildly injected L sclera. No facial asymmetry. No facial sensation deficit in V1-3 to LT. Tongue slightly deviated to R but actively moving. Ventral portion of tongue with possible tongue bite. Not much grimacing but constant opening/closing mouth without grimacing  Motor: 5/5 strength throughout b/l biceps, triceps, hand , knee flexion/extension, hip flexion, dorsi/plantar flexion. All extremities moving spontaneously antigravity. Normal tone  Sensation: Intact to LT and temperature throughout b/l  Reflexes: 1 throughout b/l biceps and BR. Mute patellar but could be due to paratonia  Coordination: Difficult to perform FTN due to motions. Repetitive finger flexion of 2nd and thumb with small amplitude  Gait: deferred due to motion in all extremities Gen - NAD, significant dyskinesias of extremities and mouth  CV - peripheral pulses palpable, no edema  Eyes - Fundoscopy not well visualized    MS: Eyes open. Responds to verbal stimuli. Alert and oriented to self, situation only. Thought [facility] for location and could not elaborate on why he was in ED. Follows all commands.   Language: Mild dysarthria and hypophonic. Unclear aphasia as asks wife to answer most of questions due to tongue and mouth movements  CN: PERRL (3mm b/l). VFF. EOMI. Mildly injected L sclera. No facial asymmetry. No facial sensation deficit in V1-3 to LT. Tongue slightly deviated to R but actively moving. Ventral portion of tongue with possible tongue bite. Not much grimacing but constant opening/closing mouth without grimacing  Motor: 5/5 strength throughout b/l biceps, triceps, hand , knee flexion/extension, hip flexion, dorsi/plantar flexion. All extremities moving spontaneously antigravity. Normal tone  Sensation: Intact to LT and temperature throughout b/l  Reflexes: 1 throughout b/l biceps and BR. Mute patellar but could be due to paratonia  Coordination: Difficult to perform FTN due to motions. Repetitive finger flexion of 2nd and thumb with small amplitude  Gait: deferred due to motion in all extremities

## 2022-03-28 NOTE — H&P ADULT - ATTENDING COMMENTS
HPI as per resident note, personally verified by me. Patient is a very limited historian and believes he is here for an MRI. When I correct him that this is not the case he will continually show me his lunch menu and insist it is (it seems he has a delusion that the lunch menu is an MRI prescription). He acknowledges dyskinesias are troublesome but denies being in any pain or other discomfort.    MS: AAO x 1.5 (did not know place, knew it was Tuesday and March but not rest of date). Rep/naming dec, speech with frequent perseveration and mild dysarthria but otherwise intelligible, usually follows simple commands. Attn/conc, recent and remote memory, fund of knowledge poor  CN: PERRL (3mm b/l). VFF. EOMI. Mildly injected L sclera. No facial asymmetry. No facial sensation deficit in V1-3 to LT. Tongue slightly deviated to R but actively moving. Ventral portion of tongue with possible tongue bite. Not much grimacing but constant opening/closing mouth without grimacing. Palate midline. Trap 5/5 b/l  Motor: 5/5 strength throughout b/l biceps, triceps, hand , knee flexion/extension, hip flexion, dorsi/plantar flexion. All extremities moving spontaneously antigravity in choreiform-dyskinetic type movements. Normal bulk and noted paratonia but no clear cogwheeling rigidity  Sensation: Intact to LT and temperature throughout b/l  Reflexes: 1 throughout b/l biceps and BR. Downgoing b/l plantar response  Coordination: Difficult to perform FTN due to motions but grossly appropriate for level of strength. Repetitive finger flexion of 2nd and thumb with small amplitude  Gait and station: Not performed due to fall risk/safety concerns with constant dyskinetic/choreiform movements in all extremities    A/P:  Dyskinesias  Encephalopathy  Parkinson Disease  HTN  HLD    - Dyskinesias and encephalopathy (psychosis?) likely secondary to iatrogenic effects from overtreatment with dopamine agonists and COMT inhibitor. Opicapone already stopped. Will readjust other medications  - Discuss with movement disorders team regarding further adjustment of Rytary dose. Will observe for improvement after  - PT/OT as tolerated  - Remainder as per resident note

## 2022-03-29 LAB
ALBUMIN SERPL ELPH-MCNC: 4.1 G/DL — SIGNIFICANT CHANGE UP (ref 3.3–5)
ALP SERPL-CCNC: 51 U/L — SIGNIFICANT CHANGE UP (ref 40–120)
ALT FLD-CCNC: <5 U/L — LOW (ref 10–45)
ANION GAP SERPL CALC-SCNC: 12 MMOL/L — SIGNIFICANT CHANGE UP (ref 5–17)
AST SERPL-CCNC: 49 U/L — HIGH (ref 10–40)
BASOPHILS # BLD AUTO: 0.04 K/UL — SIGNIFICANT CHANGE UP (ref 0–0.2)
BASOPHILS NFR BLD AUTO: 0.6 % — SIGNIFICANT CHANGE UP (ref 0–2)
BILIRUB SERPL-MCNC: 0.8 MG/DL — SIGNIFICANT CHANGE UP (ref 0.2–1.2)
BUN SERPL-MCNC: 11 MG/DL — SIGNIFICANT CHANGE UP (ref 7–23)
CALCIUM SERPL-MCNC: 9.1 MG/DL — SIGNIFICANT CHANGE UP (ref 8.4–10.5)
CHLORIDE SERPL-SCNC: 104 MMOL/L — SIGNIFICANT CHANGE UP (ref 96–108)
CO2 SERPL-SCNC: 23 MMOL/L — SIGNIFICANT CHANGE UP (ref 22–31)
CREAT SERPL-MCNC: 0.63 MG/DL — SIGNIFICANT CHANGE UP (ref 0.5–1.3)
EGFR: 102 ML/MIN/1.73M2 — SIGNIFICANT CHANGE UP
EOSINOPHIL # BLD AUTO: 0.12 K/UL — SIGNIFICANT CHANGE UP (ref 0–0.5)
EOSINOPHIL NFR BLD AUTO: 1.8 % — SIGNIFICANT CHANGE UP (ref 0–6)
GLUCOSE SERPL-MCNC: 107 MG/DL — HIGH (ref 70–99)
HCT VFR BLD CALC: 44.1 % — SIGNIFICANT CHANGE UP (ref 39–50)
HCV AB S/CO SERPL IA: 0.13 S/CO — SIGNIFICANT CHANGE UP (ref 0–0.99)
HCV AB SERPL-IMP: SIGNIFICANT CHANGE UP
HGB BLD-MCNC: 14.4 G/DL — SIGNIFICANT CHANGE UP (ref 13–17)
IMM GRANULOCYTES NFR BLD AUTO: 0.3 % — SIGNIFICANT CHANGE UP (ref 0–1.5)
LYMPHOCYTES # BLD AUTO: 1.82 K/UL — SIGNIFICANT CHANGE UP (ref 1–3.3)
LYMPHOCYTES # BLD AUTO: 26.8 % — SIGNIFICANT CHANGE UP (ref 13–44)
MCHC RBC-ENTMCNC: 28.7 PG — SIGNIFICANT CHANGE UP (ref 27–34)
MCHC RBC-ENTMCNC: 32.7 GM/DL — SIGNIFICANT CHANGE UP (ref 32–36)
MCV RBC AUTO: 88 FL — SIGNIFICANT CHANGE UP (ref 80–100)
MONOCYTES # BLD AUTO: 0.73 K/UL — SIGNIFICANT CHANGE UP (ref 0–0.9)
MONOCYTES NFR BLD AUTO: 10.8 % — SIGNIFICANT CHANGE UP (ref 2–14)
NEUTROPHILS # BLD AUTO: 4.06 K/UL — SIGNIFICANT CHANGE UP (ref 1.8–7.4)
NEUTROPHILS NFR BLD AUTO: 59.7 % — SIGNIFICANT CHANGE UP (ref 43–77)
NRBC # BLD: 0 /100 WBCS — SIGNIFICANT CHANGE UP (ref 0–0)
PLATELET # BLD AUTO: 177 K/UL — SIGNIFICANT CHANGE UP (ref 150–400)
POTASSIUM SERPL-MCNC: 3.7 MMOL/L — SIGNIFICANT CHANGE UP (ref 3.5–5.3)
POTASSIUM SERPL-SCNC: 3.7 MMOL/L — SIGNIFICANT CHANGE UP (ref 3.5–5.3)
PROT SERPL-MCNC: 6.9 G/DL — SIGNIFICANT CHANGE UP (ref 6–8.3)
RBC # BLD: 5.01 M/UL — SIGNIFICANT CHANGE UP (ref 4.2–5.8)
RBC # FLD: 12.9 % — SIGNIFICANT CHANGE UP (ref 10.3–14.5)
SARS-COV-2 RNA SPEC QL NAA+PROBE: SIGNIFICANT CHANGE UP
SODIUM SERPL-SCNC: 139 MMOL/L — SIGNIFICANT CHANGE UP (ref 135–145)
WBC # BLD: 6.79 K/UL — SIGNIFICANT CHANGE UP (ref 3.8–10.5)
WBC # FLD AUTO: 6.79 K/UL — SIGNIFICANT CHANGE UP (ref 3.8–10.5)

## 2022-03-29 PROCEDURE — 99223 1ST HOSP IP/OBS HIGH 75: CPT | Mod: GC

## 2022-03-29 RX ORDER — ALPRAZOLAM 0.25 MG
0.25 TABLET ORAL ONCE
Refills: 0 | Status: DISCONTINUED | OUTPATIENT
Start: 2022-03-29 | End: 2022-03-29

## 2022-03-29 RX ORDER — ACETAMINOPHEN 500 MG
650 TABLET ORAL ONCE
Refills: 0 | Status: COMPLETED | OUTPATIENT
Start: 2022-03-29 | End: 2022-03-29

## 2022-03-29 RX ORDER — CARBIDOPA AND LEVODOPA 25; 100 MG/1; MG/1
1.5 TABLET ORAL
Refills: 0 | Status: DISCONTINUED | OUTPATIENT
Start: 2022-03-30 | End: 2022-03-31

## 2022-03-29 RX ADMIN — CARBIDOPA AND LEVODOPA 3 CAPSULE(S): 25; 100 TABLET ORAL at 06:35

## 2022-03-29 RX ADMIN — Medication 0.25 MILLIGRAM(S): at 18:55

## 2022-03-29 RX ADMIN — Medication 0.25 MILLIGRAM(S): at 01:35

## 2022-03-29 RX ADMIN — Medication 650 MILLIGRAM(S): at 20:26

## 2022-03-29 RX ADMIN — QUETIAPINE FUMARATE 25 MILLIGRAM(S): 200 TABLET, FILM COATED ORAL at 22:04

## 2022-03-29 RX ADMIN — Medication 0.5 MILLIGRAM(S): at 22:48

## 2022-03-29 RX ADMIN — Medication 650 MILLIGRAM(S): at 21:26

## 2022-03-29 RX ADMIN — Medication 0.25 MILLIGRAM(S): at 06:35

## 2022-03-29 RX ADMIN — Medication 100 MILLIGRAM(S): at 22:04

## 2022-03-29 RX ADMIN — SIMVASTATIN 10 MILLIGRAM(S): 20 TABLET, FILM COATED ORAL at 22:04

## 2022-03-29 RX ADMIN — PANTOPRAZOLE SODIUM 40 MILLIGRAM(S): 20 TABLET, DELAYED RELEASE ORAL at 06:36

## 2022-03-29 NOTE — PROGRESS NOTE ADULT - SUBJECTIVE AND OBJECTIVE BOX
Neurology Progress Note  Roberto Vilchis, PGY-2     SUBJECTIVE/OBJECTIVE/INTERVAL EVENTS: Patient seen and examined at bedside w/ neuro attending and team. Used  in [place] language line [id number]. Patient not oriented to place/situation/date/time. Goes on tangents and unable to completely answer questions. Has moderate x4 extremities dystonia. Otherwise appears nontoxic.     VITALS & EXAMINATION:  Vital Signs Last 24 Hrs  T(C): 36.5 (29 Mar 2022 05:18), Max: 36.9 (29 Mar 2022 02:25)  T(F): 97.7 (29 Mar 2022 05:18), Max: 98.4 (29 Mar 2022 02:25)  HR: 58 (29 Mar 2022 05:18) (58 - 88)  BP: 99/62 (29 Mar 2022 05:18) (99/62 - 138/64)  BP(mean): --  RR: 16 (29 Mar 2022 05:18) (16 - 20)  SpO2: 100% (29 Mar 2022 05:18) (96% - 100%)     General: appears comfortable, nontoxic  Extremities: no swelling  Respiratory: breathing comfortably    MS: Eyes open. Responds to verbal stimuli. Alert and oriented to self, not place/ situation/ date. Could not elaborate on why he was in hospital. Follows all commands.   Language: Hypophonic.    CN: pupils equal round bilaterally. VFF. EOMI. No facial asymmetry.    Motor: 5/5 strength throughout b/l biceps, triceps, hand , knee flexion/extension, hip flexion, dorsi/plantar flexion. All extremities moving spontaneously antigravity. No increased tone, spasticity. Has dystonia x4 extremities  Sensation: Intact to LT and temperature throughout b/l   Coordination: Difficult to perform FTN due to motions.   Gait: deferred due to motion in all extremities    LABORATORY:  CBC                       14.4   6.79  )-----------( 177      ( 29 Mar 2022 06:29 )             44.1     Chem 03-29    139  |  104  |  11  ----------------------------<  107<H>  3.7   |  23  |  0.63    Ca    9.1      29 Mar 2022 06:29    TPro  6.9  /  Alb  4.1  /  TBili  0.8  /  DBili  x   /  AST  49<H>  /  ALT  <5<L>  /  AlkPhos  51  03-29    LFTs LIVER FUNCTIONS - ( 29 Mar 2022 06:29 )  Alb: 4.1 g/dL / Pro: 6.9 g/dL / ALK PHOS: 51 U/L / ALT: <5 U/L / AST: 49 U/L / GGT: x           Coagulopathy   Lipid Panel   A1c   Cardiac enzymes CARDIAC MARKERS ( 28 Mar 2022 20:48 )  x     / x     / 865 U/L / x     / x        U/A   CSF  Immunological  Other    STUDIES & IMAGING: (EEG, CT, MR, U/S, TTE/QUANG):

## 2022-03-29 NOTE — PROGRESS NOTE ADULT - ASSESSMENT
Patient AV is a 73 yo St Helenian speaking M with PMHx of Parkinson's and HLD presented to [facility] ED on 3/28 due to worsening dyskinesia and psychosis related to Parkinson's disease. Dr. Veliz (outpt neurologist at [place]) requesting inpt eval with movement disorder input as well as psychiatry. Hallucinations involve seeing people who are not physically present. Neuro exam showing delirium, confusion, all extremity dyskinesias. Was most recently on rytary 61. mg ER 3 caps TID, benztropine 0.5mg qdaily, quetiapine 25mg qdaily, xanax o.25 BID. On asa 81mg qdaily.    Impression: Chronic, worsening all extremity dyskinesia, mildly hypotensive (unclear baseline), confusion, agitation, delirium and hallucination concerning for high dose rytary side effect in setting of Parkinsons Disease    Plan  [] Will discuss with neuro movement specialists regarding down-titrating medication including Rytary (pt will take home med, wife left at bedside)  [] Neurocheck and vital per unit protocol  [] Fall precaution    Patient was seen on morning rounds with attending Dr Portillo and neurology team. Recommendations finalized once signed by attending.      Patient AV is a 71 yo Tongan speaking M with PMHx of Parkinson's and HLD presented to [facility] ED on 3/28 due to worsening dyskinesia and psychosis related to Parkinson's disease. Dr. Veliz (outpt neurologist at [place]) requesting inpt eval with movement disorder input as well as psychiatry. Hallucinations involve seeing people who are not physically present. Neuro exam showing delirium, confusion, all extremity dyskinesias. Was most recently on rytary 61. mg ER 3 caps TID, benztropine 0.5mg qdaily, quetiapine 25mg qdaily, xanax o.25 BID. On asa 81mg qdaily.    Impression: Chronic, worsening all extremity dyskinesia, mildly hypotensive (unclear baseline), confusion, agitation, delirium and hallucination concerning for high dose rytary side effect in setting of Parkinsons Disease    Plan  [ ] Discussed with neuro movement specialist Dr Cramer regarding down-titrating medication including Rytary (pt will take home med, wife left at bedside). Recommends holding his rytary inpatient since hospital does not carry rytary and patient only has higher doses. Will hold and reassess periodically today 3/29 unless begins to develop Parkinsonian symptoms at which point we will start sinemet 25/100, 1.5 tabs 6AM/10AM/2PM/6PM.   [ ] Neurocheck and vital per unit protocol  [ ] Fall precaution    Patient was seen on morning rounds with attending Dr Portillo and neurology team. Recommendations finalized once signed by attending.      Patient AV is a 73 yo Palestinian speaking M with PMHx of Parkinson's and HLD presented to [facility] ED on 3/28 due to worsening dyskinesia and psychosis related to Parkinson's disease. Dr. Veliz (outpt neurologist at [place]) requesting inpt eval with movement disorder input as well as psychiatry. Hallucinations involve seeing people who are not physically present. Neuro exam showing delirium, confusion, all extremity dyskinesias. Was most recently on rytary 61. mg ER 3 caps TID, benztropine 0.5mg qdaily, quetiapine 25mg qdaily, xanax o.25 BID. On asa 81mg qdaily.    Impression: Chronic, worsening all extremity dyskinesia, mildly hypotensive (unclear baseline), confusion, agitation, delirium and hallucination concerning for high dose rytary side effect in setting of Parkinsons Disease    Plan  [ ] Discussed with neuro movement specialist Dr Benitez regarding down-titrating medication including Rytary (pt will take home med, wife left at bedside). Recommends holding his rytary inpatient since hospital does not carry rytary and patient only has higher doses. Will hold and reassess periodically today 3/29 unless begins to develop Parkinsonian symptoms at which point we will start sinemet 25/100, 1.5 tabs 6AM/10AM/2PM/6PM.   [ ] Neurocheck and vital per unit protocol  [ ] Fall precaution    Patient was seen on morning rounds with attending Dr Portillo and neurology team. Recommendations finalized once signed by attending.

## 2022-03-29 NOTE — PATIENT PROFILE ADULT - NSTRANSFERBELONGINGSDISPO_GEN_A_NUR
Patient came to unit with ONLY a pair of sneakers, socks, , NO CELL PHONE, and own home med Rytary./with patient

## 2022-03-29 NOTE — PATIENT PROFILE ADULT - FALL HARM RISK - HARM RISK INTERVENTIONS
Assistance with ambulation/Assistance OOB with selected safe patient handling equipment/Communicate Risk of Fall with Harm to all staff/Discuss with provider need for PT consult/Monitor for mental status changes/Monitor gait and stability/Reinforce activity limits and safety measures with patient and family/Tailored Fall Risk Interventions/Use of alarms - bed, chair and/or voice tab/Visual Cue: Yellow wristband and red socks/Bed in lowest position, wheels locked, appropriate side rails in place/Call bell, personal items and telephone in reach/Instruct patient to call for assistance before getting out of bed or chair/Non-slip footwear when patient is out of bed/[place] to call system/Physically safe environment - no spills, clutter or unnecessary equipment/Purposeful Proactive Rounding/Room/bathroom lighting operational, light cord in reach

## 2022-03-29 NOTE — ED ADULT NURSE REASSESSMENT NOTE - NS ED NURSE REASSESS COMMENT FT1
Paged neurology r/t pt walking down hallway, difficulty staying in bed and xanax dose pt usually takes at night time is out of parameter. Paged neurology r/t pt walking down hallway, difficulty staying in bed and xanax dose pt usually takes at night time is out of parameter. Used  #[id number] to speak to pt, pt repetitively asking for doctor and wife after RN explained pt wife left earlier and doctor recently saw pt.

## 2022-03-30 ENCOUNTER — TRANSCRIPTION ENCOUNTER (OUTPATIENT)
Age: 72
End: 2022-03-30

## 2022-03-30 LAB
ANION GAP SERPL CALC-SCNC: 9 MMOL/L — SIGNIFICANT CHANGE UP (ref 5–17)
BUN SERPL-MCNC: 10 MG/DL — SIGNIFICANT CHANGE UP (ref 7–23)
CALCIUM SERPL-MCNC: 9.5 MG/DL — SIGNIFICANT CHANGE UP (ref 8.4–10.5)
CHLORIDE SERPL-SCNC: 102 MMOL/L — SIGNIFICANT CHANGE UP (ref 96–108)
CO2 SERPL-SCNC: 27 MMOL/L — SIGNIFICANT CHANGE UP (ref 22–31)
CREAT SERPL-MCNC: 0.79 MG/DL — SIGNIFICANT CHANGE UP (ref 0.5–1.3)
EGFR: 95 ML/MIN/1.73M2 — SIGNIFICANT CHANGE UP
GLUCOSE SERPL-MCNC: 111 MG/DL — HIGH (ref 70–99)
HCT VFR BLD CALC: 49 % — SIGNIFICANT CHANGE UP (ref 39–50)
HGB BLD-MCNC: 15.8 G/DL — SIGNIFICANT CHANGE UP (ref 13–17)
MAGNESIUM SERPL-MCNC: 2.2 MG/DL — SIGNIFICANT CHANGE UP (ref 1.6–2.6)
MCHC RBC-ENTMCNC: 29.2 PG — SIGNIFICANT CHANGE UP (ref 27–34)
MCHC RBC-ENTMCNC: 32.2 GM/DL — SIGNIFICANT CHANGE UP (ref 32–36)
MCV RBC AUTO: 90.4 FL — SIGNIFICANT CHANGE UP (ref 80–100)
NRBC # BLD: 0 /100 WBCS — SIGNIFICANT CHANGE UP (ref 0–0)
PHOSPHATE SERPL-MCNC: 3 MG/DL — SIGNIFICANT CHANGE UP (ref 2.5–4.5)
PLATELET # BLD AUTO: 181 K/UL — SIGNIFICANT CHANGE UP (ref 150–400)
POTASSIUM SERPL-MCNC: 4.3 MMOL/L — SIGNIFICANT CHANGE UP (ref 3.5–5.3)
POTASSIUM SERPL-SCNC: 4.3 MMOL/L — SIGNIFICANT CHANGE UP (ref 3.5–5.3)
RBC # BLD: 5.42 M/UL — SIGNIFICANT CHANGE UP (ref 4.2–5.8)
RBC # FLD: 13.2 % — SIGNIFICANT CHANGE UP (ref 10.3–14.5)
SODIUM SERPL-SCNC: 138 MMOL/L — SIGNIFICANT CHANGE UP (ref 135–145)
WBC # BLD: 4.83 K/UL — SIGNIFICANT CHANGE UP (ref 3.8–10.5)
WBC # FLD AUTO: 4.83 K/UL — SIGNIFICANT CHANGE UP (ref 3.8–10.5)

## 2022-03-30 PROCEDURE — 99233 SBSQ HOSP IP/OBS HIGH 50: CPT | Mod: GC

## 2022-03-30 RX ADMIN — PANTOPRAZOLE SODIUM 40 MILLIGRAM(S): 20 TABLET, DELAYED RELEASE ORAL at 13:56

## 2022-03-30 RX ADMIN — Medication 0.25 MILLIGRAM(S): at 06:54

## 2022-03-30 RX ADMIN — ENOXAPARIN SODIUM 40 MILLIGRAM(S): 100 INJECTION SUBCUTANEOUS at 06:51

## 2022-03-30 RX ADMIN — CARBIDOPA AND LEVODOPA 1.5 TABLET(S): 25; 100 TABLET ORAL at 10:00

## 2022-03-30 RX ADMIN — CARBIDOPA AND LEVODOPA 1.5 TABLET(S): 25; 100 TABLET ORAL at 07:16

## 2022-03-30 RX ADMIN — Medication 81 MILLIGRAM(S): at 13:55

## 2022-03-30 RX ADMIN — SIMVASTATIN 10 MILLIGRAM(S): 20 TABLET, FILM COATED ORAL at 21:20

## 2022-03-30 RX ADMIN — Medication 0.25 MILLIGRAM(S): at 18:37

## 2022-03-30 RX ADMIN — CARBIDOPA AND LEVODOPA 1.5 TABLET(S): 25; 100 TABLET ORAL at 18:37

## 2022-03-30 RX ADMIN — Medication 0.5 MILLIGRAM(S): at 21:20

## 2022-03-30 RX ADMIN — QUETIAPINE FUMARATE 25 MILLIGRAM(S): 200 TABLET, FILM COATED ORAL at 21:20

## 2022-03-30 RX ADMIN — CARBIDOPA AND LEVODOPA 1.5 TABLET(S): 25; 100 TABLET ORAL at 13:56

## 2022-03-30 RX ADMIN — Medication 100 MILLIGRAM(S): at 21:20

## 2022-03-30 RX ADMIN — LOSARTAN POTASSIUM 100 MILLIGRAM(S): 100 TABLET, FILM COATED ORAL at 06:51

## 2022-03-30 NOTE — OCCUPATIONAL THERAPY INITIAL EVALUATION ADULT - PLANNED THERAPY INTERVENTIONS, OT EVAL
ADL retraining/balance training/bed mobility training/fine motor coordination training/transfer training

## 2022-03-30 NOTE — DISCHARGE NOTE PROVIDER - ATTENDING DISCHARGE PHYSICAL EXAMINATION:
Patient is with improved dyskinesias (none apparently now) and improved cognition although some agitation overnight. As he is on chronic quetiapine as outpatient may have an element of PD psychosis versus agitation at baseline, so may be close to that. He denies any new focal neurologic deficits, pain, or other discomfort.    MS: RITA x 1.255 (thought he was in a nursing home, knew it was March but not rest of date). Rep/naming dec, speech with occasional perseveration and mild dysarthria but otherwise intelligible, usually follows simple commands. Attn/conc, recent and remote memory, fund of knowledge dec  CN: PERRL (3mm b/l). VFF. EOMI. Mildly injected L sclera. No facial asymmetry. No facial sensation deficit in V1-3 to LT. Tongue slightly deviated to R but actively moving. Ventral portion of tongue with possible tongue bite. Not much grimacing but constant opening/closing mouth without grimacing. Palate midline. Trap 5/5 b/l  Motor: 5/5 strength throughout b/l biceps, triceps, hand , knee flexion/extension, hip flexion, dorsi/plantar flexion. No choreiform-dyskinetic type movements. Normal bulk and L > R UE mild cogwheeling rigidity and bradykinesia  Sensation: Intact to LT and temperature throughout b/l  Reflexes: 1 throughout b/l biceps and BR. Downgoing b/l plantar response  Coordination: FTN intact b/l. Repetitive finger flexion of 2nd and thumb with small amplitude  Gait and station: Not performed due to fall risk/safety concerns with constant dyskinetic/choreiform movements in all extremities    A/P:  Dyskinesias (resolved)  Encephalopathy  Parkinson Disease  HTN  HLD    - Dyskinesias and encephalopathy (psychosis?) likely secondary to iatrogenic effects from overtreatment with dopamine agonists and COMT inhibitor. Opicapone already stopped. Showing further improvement since readjustment of other medications  - Discussed with movement disorders team and holding Rytary dose. Continue on Sinement 25/100mg 1.5 tabs 06:00, 10:00, 14:00, 16:00. Will observe for further improvement, including cognition  - PT/OT as tolerated  - Planning for tentative d/c today if family is comfortable and confirm he is close to neurologic baseline. Upon discharge plan to follow-up with movement disorders specialist

## 2022-03-30 NOTE — CHART NOTE - NSCHARTNOTEFT_GEN_A_CORE
Reassessed patient at bedside 15 minutes after 2PM dose for sinemet. Slight increase in rigidity of L upper extremity. Both LE chronically with increased tone as per wife and daughter at bedside. They are content with symptom improvement since admission with improved dyskinesias. Will continue to monitor and continue current medication regimen.

## 2022-03-30 NOTE — OCCUPATIONAL THERAPY INITIAL EVALUATION ADULT - LIVES WITH, PROFILE
Pt questionable historian; states he lives in an apartment with his wife, 1 flight of stairs to enter, PTA IND with ADL. Owns RW but did not use.

## 2022-03-30 NOTE — DISCHARGE NOTE PROVIDER - HOSPITAL COURSE
Patient AV is a 71 yo Zambian speaking M with PMHx of Parkinson's and HLD presented to [facility] ED on 3/28 due to worsening dyskinesia and psychosis related to Parkinson's disease. Dr. Veliz (outpt neurologist at [place]) requesting inpt eval with movement disorder input as well as psychiatry. Hallucinations involve seeing people who are not physically present. Neuro exam showing delirium, confusion, all extremity dyskinesias. Was most recently on rytary 61. mg ER 3 caps TID, benztropine 0.5mg qdaily, quetiapine 25mg qdaily, xanax o.25 BID. On asa 81mg qdaily. Was recently increased on rytary to 3 tabs TID and was recently trialed on opicapone 50mg qdaily which worsened his dyskinesias per family. Impression: Chronic, worsening all extremity dyskinesia, mildly hypotensive (unclear baseline), confusion, agitation, delirium and hallucination concerning for high dose rytary side effect in setting of Parkinsons Disease. Discussed with neuro movement specialist Dr Benitez regarding down-titrating medication including Rytary (pt will take home med, wife left at bedside). We held his rytary inpatient since he only has higher doses with him and hospital does not carry formulations, and instead transitioned to sinemet 25/100, 1.5 tabs 6AM/10AM/2PM/6PM. Patient had improvement in dyskinesias and cognitive ability during this transition.     Discussed with general neurology attending, patient was medically stable for discharge with close followup.    Patient AV is a 73 yo Gibraltarian speaking M with PMHx of Parkinson's and HLD presented to [facility] ED on 3/28 due to worsening dyskinesia and psychosis related to Parkinson's disease. Dr. Veliz (outpt neurologist at [place]) requesting inpt eval with movement disorder input as well as psychiatry. Hallucinations involve seeing people who are not physically present. Neuro exam showing delirium, confusion, all extremity dyskinesias. Was most recently on rytary 61. mg ER 3 caps TID, benztropine 0.5mg qdaily, quetiapine 25mg qdaily, xanax o.25 BID. On asa 81mg qdaily. Was recently increased on rytary to 3 tabs TID and was recently trialed on opicapone 50mg qdaily which worsened his dyskinesias per family.     Hospital Course: Discussed case with neuro movement specialist Dr. Jose De Jesus Cramer regarding down-titrating medication including Rytary (pt will take home med, wife left at bedside). We held his rytary inpatient since he only has higher doses with him and hospital does not carry formulations, and instead transitioned to sinemet 25/100, 1.5 tabs 6AM/10AM/2PM/6PM. Patient had improvement in dyskinesias and cognitive ability during this transition. Will discharge on Sinemet as opposed to Rytary.    Impression: Chronic, worsening all extremity dyskinesia, mildly hypotensive (unclear baseline), confusion, agitation, delirium and hallucination concerning for high dose rytary side effect in setting of Parkinsons Disease.     Dispo: Discussed with general neurology attending, patient is neurologically stable for discharge with close followup.

## 2022-03-30 NOTE — PHYSICAL THERAPY INITIAL EVALUATION ADULT - GAIT DEVIATIONS NOTED, PT EVAL
narrow KASANDRA/decreased lay/increased time in double stance/decreased step length/decreased stride length

## 2022-03-30 NOTE — PHYSICAL THERAPY INITIAL EVALUATION ADULT - IMPAIRED TRANSFERS: SIT/STAND, REHAB EVAL
initially mild retropulsion/impaired balance/impaired postural control/decreased ROM/decreased strength

## 2022-03-30 NOTE — DISCHARGE NOTE NURSING/CASE MANAGEMENT/SOCIAL WORK - NSDCPEFALRISK_GEN_ALL_CORE
For information on Fall & Injury Prevention, visit: [email or web address] OR  [email or web address] OR  https://www.cdc.gov/steadi/patient.html

## 2022-03-30 NOTE — DISCHARGE NOTE PROVIDER - CARE PROVIDER_API CALL
Ty Luciano; PhD)  Neurology  [address], [address]  [place] [address]  Phone: [phone]  Fax: [phone]  Follow Up Time: 2 weeks

## 2022-03-30 NOTE — OCCUPATIONAL THERAPY INITIAL EVALUATION ADULT - PERTINENT HX OF CURRENT PROBLEM, REHAB EVAL
72 y.o. Gabonese speaking M with PMH of Parkinson's and HLD presented to [facility] ED on 3/28 due to worsening akathesia and psychosis related to Parkinson's disease.  Dr. Veliz (outpt neurologist at [place]) requesting inpt eval with movement disorder input as well as psychiatry. Hallucinations involve seeing people who are not physically present. Neuro exam complicated by motion from akathesia and oral dyskinesia. Concerning for high dose rytary side effect in setting of Parkinsons Disease.

## 2022-03-30 NOTE — DISCHARGE NOTE PROVIDER - YES NO FOR MLM POSITIVE OR NEGATIVE COVID RESULT
, Elliptical Excision Additional Text (Leave Blank If You Do Not Want): The margin was drawn around the clinically apparent lesion.  An elliptical shape was then drawn on the skin incorporating the lesion and margins.  Incisions were then made along these lines to the appropriate tissue plane and the lesion was extirpated.

## 2022-03-30 NOTE — DISCHARGE NOTE NURSING/CASE MANAGEMENT/SOCIAL WORK - NSDCPEPTSTROKESIGNS_GEN_ALL_CORE
Detail Level: Generalized
Detail Level: Detailed
Sudden numbness or weakness of the face, arm, or leg, especially on one side of the body. Confusion, trouble speaking or understanding. Trouble seeing in one or both eyes. Trouble walking, dizziness, loss of balance or coordination. Severe headache.

## 2022-03-30 NOTE — PHYSICAL THERAPY INITIAL EVALUATION ADULT - PRECAUTIONS/LIMITATIONS, REHAB EVAL
Was most recently on rytary 61. mg ER 3 caps TID, benztropine 0.5mg qdaily, quetiapine 25mg qdaily, xanax o.25 BID. On asa 81mg qdaily. Dyskinesias and encephalopathy (psychosis?) likely secondary to iatrogenic effects from overtreatment with dopamine agonists and COMT inhibitor. Opicapone already stopped. Showing further improvement since readjustment of other medications/fall precautions

## 2022-03-30 NOTE — DISCHARGE NOTE NURSING/CASE MANAGEMENT/SOCIAL WORK - PATIENT PORTAL LINK FT
You can access the FollowMyHealth Patient Portal offered by [facility] by registering at the following website: [email or web address]. By joining [employer]’s FollowMyHealth portal, you will also be able to view your health information using other applications (apps) compatible with our system.

## 2022-03-30 NOTE — PROGRESS NOTE ADULT - SUBJECTIVE AND OBJECTIVE BOX
Neurology Progress Note  Roberto Vilchis, PGY-2     SUBJECTIVE/OBJECTIVE/INTERVAL EVENTS: Patient seen and examined at bedside w/ neuro attending and team.         VITALS & EXAMINATION:  Vital Signs Last 24 Hrs  T(C): 36.8 (30 Mar 2022 06:46), Max: 37.1 (29 Mar 2022 10:46)  T(F): 98.3 (30 Mar 2022 06:46), Max: 98.7 (29 Mar 2022 10:46)  HR: 58 (30 Mar 2022 06:46) (50 - 115)  BP: 174/82 (30 Mar 2022 06:46) (126/64 - 174/82)  BP(mean): --  RR: 18 (30 Mar 2022 06:46) (17 - 18)  SpO2: 99% (30 Mar 2022 06:46) (95% - 99%)       LABORATORY:  CBC                       14.4   6.79  )-----------( 177      ( 29 Mar 2022 06:29 )             44.1     Chem 03-29    139  |  104  |  11  ----------------------------<  107<H>  3.7   |  23  |  0.63    Ca    9.1      29 Mar 2022 06:29    TPro  6.9  /  Alb  4.1  /  TBili  0.8  /  DBili  x   /  AST  49<H>  /  ALT  <5<L>  /  AlkPhos  51  03-29    LFTs LIVER FUNCTIONS - ( 29 Mar 2022 06:29 )  Alb: 4.1 g/dL / Pro: 6.9 g/dL / ALK PHOS: 51 U/L / ALT: <5 U/L / AST: 49 U/L / GGT: x           Coagulopathy   Lipid Panel   A1c   Cardiac enzymes CARDIAC MARKERS ( 28 Mar 2022 20:48 )  x     / x     / 865 U/L / x     / x          U/A   CSF  Immunological  Other    STUDIES & IMAGING: (EEG, CT, MR, U/S, TTE/QUANG): Neurology Progress Note  Roberto Vilchis, PGY-2     Language line  [id number]    SUBJECTIVE/OBJECTIVE/INTERVAL EVENTS: Patient seen and examined at bedside w/ neuro attending and team. Mental status/ cognition/ delirium appear to have improved from prior day. Patient endorses no new events overnight. States dyskinesias have improved but not resolved. Feels a little dizzy but otherwise well. Held rytary and started sinemet in AM.       VITALS & EXAMINATION:  Vital Signs Last 24 Hrs  T(C): 36.8 (30 Mar 2022 06:46), Max: 37.1 (29 Mar 2022 10:46)  T(F): 98.3 (30 Mar 2022 06:46), Max: 98.7 (29 Mar 2022 10:46)  HR: 58 (30 Mar 2022 06:46) (50 - 115)  BP: 174/82 (30 Mar 2022 06:46) (126/64 - 174/82)  BP(mean): --  RR: 18 (30 Mar 2022 06:46) (17 - 18)  SpO2: 99% (30 Mar 2022 06:46) (95% - 99%)     General: appears comfortable, nontoxic, sleeping  Extremities: no swelling  Respiratory: breathing comfortably    MS: Eyes open. Responds to verbal stimuli. Alert and oriented to self, place, partly situation. Follows commands. Language: Hypophonic.    CN: pupils equal round bilaterally. VFF. EOMI. No facial asymmetry.    Motor: 5/5 strength throughout b/l biceps, triceps, hand , knee flexion/extension, hip flexion, dorsi/plantar flexion. All extremities moving spontaneously antigravity. No increased tone, spasticity. No noted dystonia x4 extremities at time of evaluation, unlike on 3/29.  Sensation: Intact to LT and temperature throughout b/l   Coordination: Difficult to perform FTN due to motions.   Gait: deferred due to motion in all extremities    LABORATORY:  CBC                       14.4   6.79  )-----------( 177      ( 29 Mar 2022 06:29 )             44.1     Chem 03-29    139  |  104  |  11  ----------------------------<  107<H>  3.7   |  23  |  0.63    Ca    9.1      29 Mar 2022 06:29    TPro  6.9  /  Alb  4.1  /  TBili  0.8  /  DBili  x   /  AST  49<H>  /  ALT  <5<L>  /  AlkPhos  51  03-29    LFTs LIVER FUNCTIONS - ( 29 Mar 2022 06:29 )  Alb: 4.1 g/dL / Pro: 6.9 g/dL / ALK PHOS: 51 U/L / ALT: <5 U/L / AST: 49 U/L / GGT: x           Coagulopathy   Lipid Panel   A1c   Cardiac enzymes CARDIAC MARKERS ( 28 Mar 2022 20:48 )  x     / x     / 865 U/L / x     / x          U/A   CSF  Immunological  Other    STUDIES & IMAGING: (EEG, CT, MR, U/S, TTE/QUANG): Neurology Progress Note  Roberto Vilchis, PGY-2     Language line  [id number]    SUBJECTIVE/OBJECTIVE/INTERVAL EVENTS: Patient seen and examined at bedside w/ neuro attending and team. Mental status/ cognition/ delirium appear to have improved from prior day. Patient endorses no new events overnight. States dyskinesias have improved but not resolved. Feels a little dizzy but otherwise well. Held rytary and started sinemet in AM.       VITALS & EXAMINATION:  Vital Signs Last 24 Hrs  T(C): 36.8 (30 Mar 2022 06:46), Max: 37.1 (29 Mar 2022 10:46)  T(F): 98.3 (30 Mar 2022 06:46), Max: 98.7 (29 Mar 2022 10:46)  HR: 58 (30 Mar 2022 06:46) (50 - 115)  BP: 174/82 (30 Mar 2022 06:46) (126/64 - 174/82)  BP(mean): --  RR: 18 (30 Mar 2022 06:46) (17 - 18)  SpO2: 99% (30 Mar 2022 06:46) (95% - 99%)     General: appears comfortable, nontoxic, sleeping  Extremities: no swelling  Respiratory: breathing comfortably    MS: Eyes open. Responds to verbal stimuli. Alert and oriented to self, year, month, not date, location, partly situation. Follows commands. Language: Hypophonic.    CN: pupils equal round bilaterally. VFF. EOMI. No facial asymmetry.    Motor: 5/5 strength throughout b/l biceps, triceps, hand , knee flexion/extension, hip flexion, dorsi/plantar flexion. All extremities moving spontaneously antigravity. mild cogwheel rigidity L>R. No aarti spasticity. No noted aarti dystonia x4 extremities at time of evaluation, unlike on 3/29.  Sensation: Intact to LT and temperature throughout b/l   Coordination: Difficult to perform FTN due to motions.   Gait: deferred due to motion in all extremities    LABORATORY:  CBC                       14.4   6.79  )-----------( 177      ( 29 Mar 2022 06:29 )             44.1     Chem 03-29    139  |  104  |  11  ----------------------------<  107<H>  3.7   |  23  |  0.63    Ca    9.1      29 Mar 2022 06:29    TPro  6.9  /  Alb  4.1  /  TBili  0.8  /  DBili  x   /  AST  49<H>  /  ALT  <5<L>  /  AlkPhos  51  03-29    LFTs LIVER FUNCTIONS - ( 29 Mar 2022 06:29 )  Alb: 4.1 g/dL / Pro: 6.9 g/dL / ALK PHOS: 51 U/L / ALT: <5 U/L / AST: 49 U/L / GGT: x           Coagulopathy   Lipid Panel   A1c   Cardiac enzymes CARDIAC MARKERS ( 28 Mar 2022 20:48 )  x     / x     / 865 U/L / x     / x          U/A   CSF  Immunological  Other    STUDIES & IMAGING: (EEG, CT, MR, U/S, TTE/QUANG): Neurology Progress Note  Roberto Vilchis, PGY-2     Language line  [id number]    SUBJECTIVE/OBJECTIVE/INTERVAL HISTORY: Patient seen and examined at bedside w/ neuro attending and team. Mental status/ cognition/ delirium appear to have improved from prior day. Patient endorses no new events overnight. States dyskinesias have improved but not resolved. Feels a little dizzy but otherwise well. Held rytary and started sinemet in AM.    FHx: Non-contributory    ROS: All systems negative except as documented in Subjective/Interval History    VITALS & EXAMINATION:  Vital Signs Last 24 Hrs  T(C): 36.8 (30 Mar 2022 06:46), Max: 37.1 (29 Mar 2022 10:46)  T(F): 98.3 (30 Mar 2022 06:46), Max: 98.7 (29 Mar 2022 10:46)  HR: 58 (30 Mar 2022 06:46) (50 - 115)  BP: 174/82 (30 Mar 2022 06:46) (126/64 - 174/82)  BP(mean): --  RR: 18 (30 Mar 2022 06:46) (17 - 18)  SpO2: 99% (30 Mar 2022 06:46) (95% - 99%)     General: appears comfortable, nontoxic, sleeping  Extremities: no swelling  Respiratory: breathing comfortably    MS: Eyes open. Responds to verbal stimuli. Alert and oriented to self, year, month, not date, location, partly situation. Follows commands. Language: Hypophonic.    CN: pupils equal round bilaterally. VFF. EOMI. No facial asymmetry.    Motor: 5/5 strength throughout b/l biceps, triceps, hand , knee flexion/extension, hip flexion, dorsi/plantar flexion. All extremities moving spontaneously antigravity. mild cogwheel rigidity L>R. No aarti spasticity. No noted aarti dystonia x4 extremities at time of evaluation, unlike on 3/29.  Sensation: Intact to LT and temperature throughout b/l   Coordination: Difficult to perform FTN due to motions.   Gait: deferred due to motion in all extremities    LABORATORY:  CBC                       14.4   6.79  )-----------( 177      ( 29 Mar 2022 06:29 )             44.1     Chem 03-29    139  |  104  |  11  ----------------------------<  107<H>  3.7   |  23  |  0.63    Ca    9.1      29 Mar 2022 06:29    TPro  6.9  /  Alb  4.1  /  TBili  0.8  /  DBili  x   /  AST  49<H>  /  ALT  <5<L>  /  AlkPhos  51  03-29    LFTs LIVER FUNCTIONS - ( 29 Mar 2022 06:29 )  Alb: 4.1 g/dL / Pro: 6.9 g/dL / ALK PHOS: 51 U/L / ALT: <5 U/L / AST: 49 U/L / GGT: x           Coagulopathy   Lipid Panel   A1c   Cardiac enzymes CARDIAC MARKERS ( 28 Mar 2022 20:48 )  x     / x     / 865 U/L / x     / x          U/A   CSF  Immunological  Other    STUDIES & IMAGING: (EEG, CT, MR, U/S, TTE/QUANG):

## 2022-03-30 NOTE — DISCHARGE NOTE PROVIDER - CARE PROVIDERS DIRECT ADDRESSES
[email or web address]

## 2022-03-30 NOTE — PROGRESS NOTE ADULT - ASSESSMENT
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 Dr. Veliz (outpt neurologist at [place]) requesting inpt eval with movement disorder input as well as psychiatry. Hallucinations involve seeing people who are not physically present. Neuro exam showing delirium, confusion, all extremity dyskinesias. Was most recently on rytary 61. mg ER 3 caps TID, benztropine 0.5mg qdaily, quetiapine 25mg qdaily, xanax o.25 BID. On asa 81mg qdaily.    Impression: Chronic, worsening all extremity dyskinesia, mildly hypotensive (unclear baseline), confusion, agitation, delirium and hallucination concerning for high dose rytary side effect in setting of Parkinsons Disease    Plan  [ ] Discussed with neuro movement specialist Dr Benitez regarding down-titrating medication including Rytary (pt will take home med, wife left at bedside). Recommends holding his rytary inpatient since hospital does not carry rytary and patient only has higher doses. Will transition to sinemet 25/100, 1.5 tabs 6AM/10AM/2PM/6PM.   [ ] Neurocheck and vital per unit protocol  [ ] Fall precaution    Patient was seen on morning rounds with attending Dr Portillo and neurology team. Plan finalized once signed by attending.      Patient AV is a 71 yo [place] speaking M with PMHx of Parkinson's and HLD presented to [facility] ED on 3/28 due to worsening dyskinesia and psychosis related to Parkinson's disease. Dr. Veliz (outpt neurologist at [place]) requesting inpt eval with movement disorder input as well as psychiatry. Hallucinations involve seeing people who are not physically present. Neuro exam showing delirium, confusion, all extremity dyskinesias. Was most recently on rytary 61. mg ER 3 caps TID, benztropine 0.5mg qdaily, quetiapine 25mg qdaily, xanax o.25 BID. On asa 81mg qdaily.    Impression: Chronic, worsening all extremity dyskinesia, mildly hypotensive (unclear baseline), confusion, agitation, delirium and hallucination concerning for high dose rytary side effect in setting of Parkinsons Disease    Plan  [ ] Discussed with neuro movement specialist Dr Benitez regarding down-titrating medication including Rytary (pt will take home med, wife left at bedside). Recommends holding his rytary inpatient since hospital does not carry rytary and patient only has higher doses. Will transition to sinemet 25/100, 1.5 tabs 6AM/10AM/2PM/6PM.   [ ] Neurocheck and vital per unit protocol  [ ] PT/OT  [ ] Fall precaution    Patient was seen on morning rounds with attending Dr Portillo and neurology team. Plan finalized once signed by attending.      Patient AV is a 73 yo [place] speaking M with PMHx of Parkinson's and HLD presented to [facility] ED on 3/28 due to worsening dyskinesia and psychosis related to Parkinson's disease. Dr. Veliz (outpt neurologist at [place]) requesting inpt eval with movement disorder input as well as psychiatry. Hallucinations involve seeing people who are not physically present. Neuro exam showing delirium, confusion, all extremity dyskinesias. Was most recently on rytary 61. mg ER 3 caps TID, benztropine 0.5mg qdaily, quetiapine 25mg qdaily, xanax o.25 BID. On asa 81mg qdaily.    Impression: Chronic, worsening all extremity dyskinesia, mildly hypotensive (unclear baseline), confusion, agitation, delirium and hallucination concerning for high dose rytary side effect in setting of Parkinsons Disease    Plan  [ ] Discussed with neuro movement specialist Dr Benitez regarding down-titrating medication including Rytary (pt will take home med, wife left at bedside). Recommends holding his rytary inpatient since hospital does not carry rytary and patient only has higher doses. Will transition to sinemet 25/100, 1.5 tabs 6AM/10AM/2PM/6PM. Plan for dc tentatively 3/31.  [ ] Neurocheck and vital per unit protocol  [ ] PT/OT  [ ] Fall precaution    Patient was seen on morning rounds with attending Dr Portillo and neurology team. Plan finalized once signed by attending.

## 2022-03-30 NOTE — DISCHARGE NOTE PROVIDER - NSDCMRMEDTOKEN_GEN_ALL_CORE_FT
aspirin 81 mg oral tablet, chewable: 1 tab(s) orally once a day  benztropine 0.5 mg oral tablet: 1 tab(s) orally once a day (at bedtime)  losartan 100 mg oral tablet: 1 tab(s) orally once a day  Metoprolol Tartrate 50 mg oral tablet: 1 tab QAM and 2 tabs QHS  pantoprazole 40 mg oral delayed release tablet: 1 tab(s) orally once a day  QUEtiapine 25 mg oral tablet: 1 tab(s) orally once a day (at bedtime)  Rytary 61.25 mg-245 mg oral capsule, extended release: 3 cap(s) orally 3 times a day  simvastatin 10 mg oral tablet: 1 tab(s) orally once a day (at bedtime)  Xanax 0.25 mg oral tablet: 1 tab(s) orally 2 times a day   aspirin 81 mg oral tablet, chewable: 1 tab(s) orally once a day  benztropine 0.5 mg oral tablet: 1 tab(s) orally once a day (at bedtime)  losartan 100 mg oral tablet: 1 tab(s) orally once a day  Metoprolol Tartrate 50 mg oral tablet: 1 tab QAM and 2 tabs QHS  pantoprazole 40 mg oral delayed release tablet: 1 tab(s) orally once a day  QUEtiapine 25 mg oral tablet: 1 tab(s) orally once a day (at bedtime)  simvastatin 10 mg oral tablet: 1 tab(s) orally once a day (at bedtime)  Sinemet 25 mg-100 mg oral tablet: 1.5 tab(s) orally 4 times a day (8AM, 12PM, 4PM, 8PM)  Xanax 0.25 mg oral tablet: 1 tab(s) orally 2 times a day

## 2022-03-30 NOTE — PHYSICAL THERAPY INITIAL EVALUATION ADULT - GAIT DISTANCE, PT EVAL
amb ~8 ft with no assistive device, however, pt unsteady, LOB posterior. balance improved with amb with RW/75 feet

## 2022-03-30 NOTE — PHYSICAL THERAPY INITIAL EVALUATION ADULT - PERTINENT HX OF CURRENT PROBLEM, REHAB EVAL
Pt is a 73 y/o male with PMH of Parkinson's and HLD presented to [facility] ED on 3/28 due to worsening dyskinesia and psychosis related to Parkinson's disease. Dr. Veliz (outpt neurologist at [place]) requesting inpt eval with movement disorder input as well as psychiatry. Hallucinations involve seeing people who are not physically present. Neuro exam showing delirium, confusion, all extremity dyskinesias.

## 2022-03-30 NOTE — PHYSICAL THERAPY INITIAL EVALUATION ADULT - ADDITIONAL COMMENTS
Pt lives with wife and daughters in an apartment, 1 flight of stairs to negotiate. PTA pt was (I) with ADLs and functional mobility. Owns RW.

## 2022-03-30 NOTE — DISCHARGE NOTE PROVIDER - NSDCCPCAREPLAN_GEN_ALL_CORE_FT
PRINCIPAL DISCHARGE DIAGNOSIS  Diagnosis: Dyskinesia  Assessment and Plan of Treatment: You presented to the hospital for abnormal movements of your hands/legs that may likely have been side effect of your rytary medication. We discontinued the medication and switched you to lower comparative dose of sinemet. We recommend you follow up with a movement disorder specialist. We can refer you to one of our own within the [facility].       PRINCIPAL DISCHARGE DIAGNOSIS  Diagnosis: Dyskinesia  Assessment and Plan of Treatment: You were admitted for abnormal/uncontrollable movements. These were likely a side effect of Rytary.  Follow up with your Primary Care Physician after discharge.  Follow up with a Movement Disorder Specialist Neurologist after discharge. Our movement disorder specialists are located at [address] [place] [address]. Please call [phone] to schedule this appointment. You can follow up with either Dr. Cramer, Dr. Luciano, or Dr. Rangel.  Bring a copy of your test results/discharge documents with you to your appointments.  You were started on the following medications:  1. Sinemet 25/100 MG tables, 1.5 tabs 4 times daily (8AM/12PM/4PM/8PM).  The following medications were discontinued:  1. Rytary  2. Opicapone  Continue your other home medications as prescribed.  Monitor your blood pressure. Reduce fat, cholesterol, and salt in your diet. Increase intake of fruits and vegetables. Limit alcohol to a minimum and do not smoke. You may be at risk for falling, make changes to your home to help you walk easier. Keep up to date on vaccinations.  Contact your Neurologist, Primary Care Provider, or report to the Emergency Room if you experience new or worsening symptoms including sudden severe headache, new numbness/tingling, new weakness, and difficulty speaking.

## 2022-03-30 NOTE — PHYSICAL THERAPY INITIAL EVALUATION ADULT - PLANNED THERAPY INTERVENTIONS, PT EVAL
GOAL: Pt will be able to negotiate 12 steps independently in 2 weeks./balance training/bed mobility training/gait training/transfer training

## 2022-03-31 VITALS
SYSTOLIC BLOOD PRESSURE: 107 MMHG | TEMPERATURE: 97 F | OXYGEN SATURATION: 95 % | HEART RATE: 67 BPM | RESPIRATION RATE: 18 BRPM | DIASTOLIC BLOOD PRESSURE: 65 MMHG

## 2022-03-31 LAB
ALBUMIN SERPL ELPH-MCNC: 3.8 G/DL — SIGNIFICANT CHANGE UP (ref 3.3–5)
ALP SERPL-CCNC: 46 U/L — SIGNIFICANT CHANGE UP (ref 40–120)
ALT FLD-CCNC: 9 U/L — LOW (ref 10–45)
ANION GAP SERPL CALC-SCNC: 11 MMOL/L — SIGNIFICANT CHANGE UP (ref 5–17)
AST SERPL-CCNC: 35 U/L — SIGNIFICANT CHANGE UP (ref 10–40)
BILIRUB SERPL-MCNC: 0.5 MG/DL — SIGNIFICANT CHANGE UP (ref 0.2–1.2)
BUN SERPL-MCNC: 10 MG/DL — SIGNIFICANT CHANGE UP (ref 7–23)
CALCIUM SERPL-MCNC: 9.3 MG/DL — SIGNIFICANT CHANGE UP (ref 8.4–10.5)
CHLORIDE SERPL-SCNC: 100 MMOL/L — SIGNIFICANT CHANGE UP (ref 96–108)
CK SERPL-CCNC: 326 U/L — HIGH (ref 30–200)
CO2 SERPL-SCNC: 24 MMOL/L — SIGNIFICANT CHANGE UP (ref 22–31)
CREAT SERPL-MCNC: 0.7 MG/DL — SIGNIFICANT CHANGE UP (ref 0.5–1.3)
EGFR: 99 ML/MIN/1.73M2 — SIGNIFICANT CHANGE UP
GLUCOSE SERPL-MCNC: 117 MG/DL — HIGH (ref 70–99)
HCT VFR BLD CALC: 45.1 % — SIGNIFICANT CHANGE UP (ref 39–50)
HGB BLD-MCNC: 15.1 G/DL — SIGNIFICANT CHANGE UP (ref 13–17)
MAGNESIUM SERPL-MCNC: 2.1 MG/DL — SIGNIFICANT CHANGE UP (ref 1.6–2.6)
MCHC RBC-ENTMCNC: 29.4 PG — SIGNIFICANT CHANGE UP (ref 27–34)
MCHC RBC-ENTMCNC: 33.5 GM/DL — SIGNIFICANT CHANGE UP (ref 32–36)
MCV RBC AUTO: 87.7 FL — SIGNIFICANT CHANGE UP (ref 80–100)
NRBC # BLD: 0 /100 WBCS — SIGNIFICANT CHANGE UP (ref 0–0)
PHOSPHATE SERPL-MCNC: 3 MG/DL — SIGNIFICANT CHANGE UP (ref 2.5–4.5)
PLATELET # BLD AUTO: 202 K/UL — SIGNIFICANT CHANGE UP (ref 150–400)
POTASSIUM SERPL-MCNC: 4 MMOL/L — SIGNIFICANT CHANGE UP (ref 3.5–5.3)
POTASSIUM SERPL-SCNC: 4 MMOL/L — SIGNIFICANT CHANGE UP (ref 3.5–5.3)
PROT SERPL-MCNC: 6.5 G/DL — SIGNIFICANT CHANGE UP (ref 6–8.3)
RBC # BLD: 5.14 M/UL — SIGNIFICANT CHANGE UP (ref 4.2–5.8)
RBC # FLD: 12.8 % — SIGNIFICANT CHANGE UP (ref 10.3–14.5)
SODIUM SERPL-SCNC: 135 MMOL/L — SIGNIFICANT CHANGE UP (ref 135–145)
WBC # BLD: 6.39 K/UL — SIGNIFICANT CHANGE UP (ref 3.8–10.5)
WBC # FLD AUTO: 6.39 K/UL — SIGNIFICANT CHANGE UP (ref 3.8–10.5)

## 2022-03-31 PROCEDURE — 97165 OT EVAL LOW COMPLEX 30 MIN: CPT

## 2022-03-31 PROCEDURE — U0003: CPT

## 2022-03-31 PROCEDURE — 85027 COMPLETE CBC AUTOMATED: CPT

## 2022-03-31 PROCEDURE — 36415 COLL VENOUS BLD VENIPUNCTURE: CPT

## 2022-03-31 PROCEDURE — 80048 BASIC METABOLIC PNL TOTAL CA: CPT

## 2022-03-31 PROCEDURE — 99239 HOSP IP/OBS DSCHRG MGMT >30: CPT | Mod: GC

## 2022-03-31 PROCEDURE — 99285 EMERGENCY DEPT VISIT HI MDM: CPT | Mod: 25

## 2022-03-31 PROCEDURE — U0005: CPT

## 2022-03-31 PROCEDURE — 85025 COMPLETE CBC W/AUTO DIFF WBC: CPT

## 2022-03-31 PROCEDURE — 82550 ASSAY OF CK (CPK): CPT

## 2022-03-31 PROCEDURE — 80053 COMPREHEN METABOLIC PANEL: CPT

## 2022-03-31 PROCEDURE — 83735 ASSAY OF MAGNESIUM: CPT

## 2022-03-31 PROCEDURE — 97161 PT EVAL LOW COMPLEX 20 MIN: CPT

## 2022-03-31 PROCEDURE — 84100 ASSAY OF PHOSPHORUS: CPT

## 2022-03-31 PROCEDURE — 86803 HEPATITIS C AB TEST: CPT

## 2022-03-31 RX ORDER — QUETIAPINE FUMARATE 200 MG/1
12.5 TABLET, FILM COATED ORAL ONCE
Refills: 0 | Status: COMPLETED | OUTPATIENT
Start: 2022-03-31 | End: 2022-03-31

## 2022-03-31 RX ORDER — CARBIDOPA AND LEVODOPA 25; 100 MG/1; MG/1
1.5 TABLET ORAL
Qty: 180 | Refills: 0
Start: 2022-03-31 | End: 2022-04-29

## 2022-03-31 RX ORDER — CARBIDOPA AND LEVODOPA 25; 100 MG/1; MG/1
3 TABLET ORAL
Qty: 0 | Refills: 0 | DISCHARGE

## 2022-03-31 RX ORDER — ACETAMINOPHEN 500 MG
650 TABLET ORAL EVERY 6 HOURS
Refills: 0 | Status: DISCONTINUED | OUTPATIENT
Start: 2022-03-31 | End: 2022-03-31

## 2022-03-31 RX ADMIN — Medication 650 MILLIGRAM(S): at 05:59

## 2022-03-31 RX ADMIN — CARBIDOPA AND LEVODOPA 1.5 TABLET(S): 25; 100 TABLET ORAL at 12:33

## 2022-03-31 RX ADMIN — CARBIDOPA AND LEVODOPA 1.5 TABLET(S): 25; 100 TABLET ORAL at 05:52

## 2022-03-31 RX ADMIN — PANTOPRAZOLE SODIUM 40 MILLIGRAM(S): 20 TABLET, DELAYED RELEASE ORAL at 05:51

## 2022-03-31 RX ADMIN — CARBIDOPA AND LEVODOPA 1.5 TABLET(S): 25; 100 TABLET ORAL at 14:38

## 2022-03-31 RX ADMIN — Medication 50 MILLIGRAM(S): at 05:29

## 2022-03-31 RX ADMIN — Medication 81 MILLIGRAM(S): at 12:32

## 2022-03-31 RX ADMIN — LOSARTAN POTASSIUM 100 MILLIGRAM(S): 100 TABLET, FILM COATED ORAL at 05:28

## 2022-03-31 RX ADMIN — Medication 650 MILLIGRAM(S): at 05:29

## 2022-03-31 RX ADMIN — Medication 0.25 MILLIGRAM(S): at 05:29

## 2022-03-31 RX ADMIN — QUETIAPINE FUMARATE 12.5 MILLIGRAM(S): 200 TABLET, FILM COATED ORAL at 03:53

## 2022-03-31 NOTE — PROGRESS NOTE ADULT - ATTENDING COMMENTS
Interval History as per resident note, personally verified by me. Patient is with improved dyskinesias (none apparently now) and improved cognition although some agitation overnight. As he is on chronic quetiapine as outpatient may have an element of PD psychosis versus agitation at baseline, so may be close to that. He denies any new focal neurologic deficits, pain, or other discomfort.    MS: NAYELIO x 1.255 (thought he was in a nursing home, knew it was March but not rest of date). Rep/naming dec, speech with occasional perseveration and mild dysarthria but otherwise intelligible, usually follows simple commands. Attn/conc, recent and remote memory, fund of knowledge dec  CN: PERRL (3mm b/l). VFF. EOMI. Mildly injected L sclera. No facial asymmetry. No facial sensation deficit in V1-3 to LT. Tongue slightly deviated to R but actively moving. Ventral portion of tongue with possible tongue bite. Not much grimacing but constant opening/closing mouth without grimacing. Palate midline. Trap 5/5 b/l  Motor: 5/5 strength throughout b/l biceps, triceps, hand , knee flexion/extension, hip flexion, dorsi/plantar flexion. No choreiform-dyskinetic type movements. Normal bulk and L > R UE mild cogwheeling rigidity and bradykinesia  Sensation: Intact to LT and temperature throughout b/l  Reflexes: 1 throughout b/l biceps and BR. Downgoing b/l plantar response  Coordination: FTN intact b/l. Repetitive finger flexion of 2nd and thumb with small amplitude  Gait and station: Not performed due to fall risk/safety concerns with constant dyskinetic/choreiform movements in all extremities    A/P:  Dyskinesias (resolved)  Encephalopathy  Parkinson Disease  HTN  HLD    - Dyskinesias and encephalopathy (psychosis?) likely secondary to iatrogenic effects from overtreatment with dopamine agonists and COMT inhibitor. Opicapone already stopped. Showing further improvement since readjustment of other medications  - Discussed with movement disorders team and holding Rytary dose. Continue on Sinement 25/100mg 1.5 tabs 06:00, 10:00, 14:00, 16:00. Will observe for further improvement, including cognition  - PT/OT as tolerated  - Planning for tentative d/c today if family is comfortable and confirm he is close to neurologic baseline. Upon discharge plan to follow-up with movement disorders specialist  - Remainder as per resident note
Interval History as per resident note, personally verified by me. Patient is with improved dyskinesias (none apparently now) and improved cognition but likely not completely back to baseline. No further delusions. May have some mild dizziness but somewhat. He denies being in any new focal neurologic deficits, pain, or other discomfort.    MS: AAO x 1.5 (did not know place, knew it was 3/2022 but not rest of date). Rep/naming dec, speech with occasional perseveration and mild dysarthria but otherwise intelligible, usually follows simple commands. Attn/conc, recent and remote memory, fund of knowledge dec  CN: PERRL (3mm b/l). VFF. EOMI. Mildly injected L sclera. No facial asymmetry. No facial sensation deficit in V1-3 to LT. Tongue slightly deviated to R but actively moving. Ventral portion of tongue with possible tongue bite. Not much grimacing but constant opening/closing mouth without grimacing. Palate midline. Trap 5/5 b/l  Motor: 5/5 strength throughout b/l biceps, triceps, hand , knee flexion/extension, hip flexion, dorsi/plantar flexion. No choreiform-dyskinetic type movements. Normal bulk and L > R UE mild cogwheeling rigidity and bradykinesia  Sensation: Intact to LT and temperature throughout b/l  Reflexes: 1 throughout b/l biceps and BR. Downgoing b/l plantar response  Coordination: FTN intact b/l. Repetitive finger flexion of 2nd and thumb with small amplitude  Gait and station: Not performed due to fall risk/safety concerns with constant dyskinetic/choreiform movements in all extremities    A/P:  Dyskinesias (improved)  Encephalopathy  Parkinson Disease  HTN  HLD    - Dyskinesias and encephalopathy (psychosis?) likely secondary to iatrogenic effects from overtreatment with dopamine agonists and COMT inhibitor. Opicapone already stopped. Showing further improvement since readjustment of other medications  - Discussed with movement disorders team and holding Rytary dose. Now on Sinement 25/100mg 1.5 tabs 06:00, 10:00, 14:00, 16:00. Will observe for further improvement, including cognition  - PT/OT as tolerated  - Planning for tentative d/c on 3/31  - Remainder as per resident note

## 2022-03-31 NOTE — PROVIDER CONTACT NOTE (OTHER) - ACTION/TREATMENT ORDERED:
Reschedule refusal medication for 2 hours later after neuro speaks with patient.
CARTER Olivas notified. Will continue to monitor.

## 2022-03-31 NOTE — PROVIDER CONTACT NOTE (OTHER) - ASSESSMENT
Pt exhibited aggressive behavior. Pt requested to ambulate to the bathroom and RN refused d/t safety and fall risk.

## 2022-03-31 NOTE — PROVIDER CONTACT NOTE (OTHER) - BACKGROUND
Pt came in for drug-induced akathisia, dyskinesia, psychosis and hallucinations. PMH of Parkinson disease and  essential HTN. Pt was taking rytary but switched to sinemet in the hospital.

## 2022-03-31 NOTE — PROGRESS NOTE ADULT - ASSESSMENT
Assessment: 73 yo Kazakh-speaking male with a PMHx of Parkinson's and HLD who presented to [facility] ED on 3/28 due to worsening dyskinesia and psychosis related to Parkinson's disease/medication side effects. Dr. Veliz (outpatient neurologist) requesting inpatient eval with movement disorder input. As outpatient, was most recently on Rytary 61.25/245MG ER 3 caps TID, Benztropine 0.5MG QD, Seroquel 25MG QD, Xanax 0.25MG BID. Was also started on Opicapone 50MG QD on 3/25, however patient stopped taking this medication on 3/27 as it made his dyskinesia even worse.    Impression: Chronic, worsening all extremity dyskinesia, mildly hypotensive (unclear baseline), confusion, agitation, delirium and hallucination concerning for high-dose Rytary side effect in setting of Parkinson's Disease.    Plan:  [] Neuro checks/vitals Q4HR.  [] Fall precautions.  [] Hold home Rytary.  [] c/w home Seroquel 25MG PO QHS.  [] Discussed case with movement disorder specialist Dr. Cramer. Will continue patient on Sinemet 25/100 1.5 tabs QID (6/10/14/18). If patient complains of akinesia at night, will consider adding QHS dose of Sinemet ER.  [] PT/OT: Recommend home with home PT/OT and shower chair.  [] DVT PPx:      Assessment: 71 yo Urdu-speaking male with a PMHx of Parkinson's and HLD who presented to [facility] ED on 3/28 due to worsening dyskinesia and psychosis related to Parkinson's disease/medication side effects. Dr. Veliz (outpatient neurologist) requesting inpatient eval with movement disorder input. As outpatient, was most recently on Rytary 61.25/245MG ER 3 caps TID, Benztropine 0.5MG QD, Seroquel 25MG QD, Xanax 0.25MG BID. Was also started on Opicapone 50MG QD on 3/25, however patient stopped taking this medication on 3/27 as it made his dyskinesia even worse.    Impression: Chronic, worsening all extremity dyskinesia, mildly hypotensive (unclear baseline), confusion, agitation, delirium and hallucination concerning for high-dose Rytary side effect in setting of Parkinson's Disease.    Plan:  [] Neuro checks/vitals Q4HR.  [] Fall precautions.  [] Hold home Rytary.  [] c/w home Seroquel 25MG PO QHS.  [] Discussed case with movement disorder specialist Dr. Cramer. Will continue patient on Sinemet 25/100 1.5 tabs QID (6/10/14/18). If patient complains of akinesia at night, will consider adding QHS dose of Sinemet ER.  [] PT/OT: Recommend home with home PT/OT and shower chair.  [] DVT PPx: Lovenox 40MG SC QD.  [] Diet: Soft and Bite sized.    Case seen and discussed with neurology attending Dr. Portillo.     Assessment: 71 yo Setsw[place]-speaking male with a PMHx of Parkinson's and HLD who presented to [facility] ED on 3/28 due to worsening dyskinesia and psychosis related to Parkinson's disease/medication side effects. Dr. Veliz (outpatient neurologist) requesting inpatient eval with movement disorder input. As outpatient, was most recently on Rytary 61.25/245MG ER 3 caps TID, Benztropine 0.5MG QD, Seroquel 25MG QD, Xanax 0.25MG BID. Was also started on Opicapone 50MG QD on 3/25, however patient stopped taking this medication on 3/27 as it made his dyskinesia even worse.    Impression: Chronic, worsening all extremity dyskinesia, mildly hypotensive (unclear baseline), confusion, agitation, delirium and hallucination concerning for high-dose Rytary side effect in setting of Parkinson's Disease.    Plan:  [] Neuro checks/vitals Q4HR.  [] Fall precautions.  [] Hold home Rytary.  [] c/w home Seroquel 25MG PO QHS.  [] Discussed case with movement disorder specialist Dr. Cramer. Will continue patient on Sinemet 25/100 1.5 tabs QID (6/10/14/18). If patient complains of akinesia at night, will consider adding QHS dose of Sinemet ER.  [] PT/OT: Recommend home with home PT/OT and shower chair.  [] DVT PPx: Lovenox 40MG SC QD.  [] Diet: Soft and Bite sized.  [] Dispo: To be discharged home today.    Case seen and discussed with neurology attending Dr. Portillo.

## 2022-03-31 NOTE — PROGRESS NOTE ADULT - SUBJECTIVE AND OBJECTIVE BOX
MRN-[id number]    Subjective: 73 yo male seen and examined at bedside. Per report patient was agitated overnight, ambulating around obs room and was aggressive to staff when told he could not go to the restroom by himself due to fall risk concerns.    PAST MEDICAL & SURGICAL HISTORY:  Essential hypertension    Other hyperlipidemia    Parkinson disease    No significant past surgical history    FAMILY HISTORY:  No pertinent family history in first degree relatives    Social Hx:  Nonsmoker, no drug or alcohol use    Home Medications:  benztropine 0.5 mg oral tablet: 1 tab(s) orally once a day (at bedtime) (28 Mar 2022 21:51)  losartan 100 mg oral tablet: 1 tab(s) orally once a day (28 Mar 2022 21:51)  Metoprolol Tartrate 50 mg oral tablet: 1 tab QAM and 2 tabs QHS (28 Mar 2022 21:51)  pantoprazole 40 mg oral delayed release tablet: 1 tab(s) orally once a day (28 Mar 2022 21:51)  QUEtiapine 25 mg oral tablet: 1 tab(s) orally once a day (at bedtime) (28 Mar 2022 21:51)  Rytary 61.25 mg-245 mg oral capsule, extended release: 3 cap(s) orally 3 times a day (28 Mar 2022 21:51)  simvastatin 10 mg oral tablet: 1 tab(s) orally once a day (at bedtime) (28 Mar 2022 21:51)  Xanax 0.25 mg oral tablet: 1 tab(s) orally 2 times a day (28 Mar 2022 21:51)    MEDICATIONS  (STANDING):  ALPRAZolam 0.25 milliGRAM(s) Oral two times a day  aspirin  chewable 81 milliGRAM(s) Oral daily  benztropine 0.5 milliGRAM(s) Oral at bedtime  carbidopa/levodopa  25/100 1.5 Tablet(s) Oral <User Schedule>  enoxaparin Injectable 40 milliGRAM(s) SubCutaneous every 24 hours  losartan 100 milliGRAM(s) Oral daily  metoprolol tartrate 50 milliGRAM(s) Oral daily  metoprolol tartrate 100 milliGRAM(s) Oral at bedtime  pantoprazole    Tablet 40 milliGRAM(s) Oral before breakfast  QUEtiapine 25 milliGRAM(s) Oral at bedtime  simvastatin 10 milliGRAM(s) Oral at bedtime  sodium chloride 0.9%. 1000 milliLiter(s) (75 mL/Hr) IV Continuous <Continuous>    MEDICATIONS  (PRN):  acetaminophen     Tablet .. 650 milliGRAM(s) Oral every 6 hours PRN Temp greater or equal to 38C (100.4F), Mild Pain (1 - 3), Moderate Pain (4 - 6)    Allergies  No Known Allergies	    ROS: Pertinent positives above, all other ROS were reviewed and are negative.      Vital Signs Last 24 Hrs  T(C): 37.1 (31 Mar 2022 08:57), Max: 37.1 (31 Mar 2022 08:57)  T(F): 98.7 (31 Mar 2022 08:57), Max: 98.7 (31 Mar 2022 08:57)  HR: 70 (31 Mar 2022 08:57) (60 - 88)  BP: 101/70 (31 Mar 2022 08:57) (101/70 - 170/94)  RR: 18 (31 Mar 2022 08:57) (18 - 18)  SpO2: 99% (31 Mar 2022 08:57) (95% - 99%)    GENERAL EXAM:  Constitutional:  Head:  Extremities:    NEUROLOGICAL EXAM:    Labs:   cbc                      15.1   6.39  )-----------( 202      ( 31 Mar 2022 06:30 )             45.1     [id number]    135  |  100  |  10  ----------------------------<  117<H>  4.0   |  24  |  0.70    Ca    9.3      31 Mar 2022 06:30  Phos  3.0     03-31  Mg     2.1     03-31    TPro  6.5  /  Alb  3.8  /  TBili  0.5  /  DBili  x   /  AST  35  /  ALT  9<L>  /  AlkPhos  46  03-31    Cardiac Markers: CARDIAC MARKERS ( 31 Mar 2022 06:30 )  x     / x     / 326 U/L / x     / x        LIVER FUNCTIONS - ( 31 Mar 2022 06:30 )  Alb: 3.8 g/dL / Pro: 6.5 g/dL / ALK PHOS: 46 U/L / ALT: 9 U/L / AST: 35 U/L / GGT: x          MRN-[id number]    Subjective: 73 yo male seen and examined at bedside. Interview conducted in [place] by attending Dr. Hayden Portillo. Per report patient was agitated overnight, ambulating around obs room and was aggressive to staff when told he could not go to the restroom by himself due to fall risk concerns. Patient denies feeling stiff overnight. Reports mild neck and back pain.    PAST MEDICAL & SURGICAL HISTORY:  Essential hypertension    Other hyperlipidemia    Parkinson disease    No significant past surgical history    FAMILY HISTORY:  No pertinent family history in first degree relatives    Social Hx:  Nonsmoker, no drug or alcohol use    Home Medications:  benztropine 0.5 mg oral tablet: 1 tab(s) orally once a day (at bedtime) (28 Mar 2022 21:51)  losartan 100 mg oral tablet: 1 tab(s) orally once a day (28 Mar 2022 21:51)  Metoprolol Tartrate 50 mg oral tablet: 1 tab QAM and 2 tabs QHS (28 Mar 2022 21:51)  pantoprazole 40 mg oral delayed release tablet: 1 tab(s) orally once a day (28 Mar 2022 21:51)  QUEtiapine 25 mg oral tablet: 1 tab(s) orally once a day (at bedtime) (28 Mar 2022 21:51)  Rytary 61.25 mg-245 mg oral capsule, extended release: 3 cap(s) orally 3 times a day (28 Mar 2022 21:51)  simvastatin 10 mg oral tablet: 1 tab(s) orally once a day (at bedtime) (28 Mar 2022 21:51)  Xanax 0.25 mg oral tablet: 1 tab(s) orally 2 times a day (28 Mar 2022 21:51)    MEDICATIONS  (STANDING):  ALPRAZolam 0.25 milliGRAM(s) Oral two times a day  aspirin  chewable 81 milliGRAM(s) Oral daily  benztropine 0.5 milliGRAM(s) Oral at bedtime  carbidopa/levodopa  25/100 1.5 Tablet(s) Oral <User Schedule>  enoxaparin Injectable 40 milliGRAM(s) SubCutaneous every 24 hours  losartan 100 milliGRAM(s) Oral daily  metoprolol tartrate 50 milliGRAM(s) Oral daily  metoprolol tartrate 100 milliGRAM(s) Oral at bedtime  pantoprazole    Tablet 40 milliGRAM(s) Oral before breakfast  QUEtiapine 25 milliGRAM(s) Oral at bedtime  simvastatin 10 milliGRAM(s) Oral at bedtime  sodium chloride 0.9%. 1000 milliLiter(s) (75 mL/Hr) IV Continuous <Continuous>    MEDICATIONS  (PRN):  acetaminophen     Tablet .. 650 milliGRAM(s) Oral every 6 hours PRN Temp greater or equal to 38C (100.4F), Mild Pain (1 - 3), Moderate Pain (4 - 6)    Allergies  No Known Allergies	    ROS: Pertinent positives above, all other ROS were reviewed and are negative.      Vital Signs Last 24 Hrs  T(C): 37.1 (31 Mar 2022 08:57), Max: 37.1 (31 Mar 2022 08:57)  T(F): 98.7 (31 Mar 2022 08:57), Max: 98.7 (31 Mar 2022 08:57)  HR: 70 (31 Mar 2022 08:57) (60 - 88)  BP: 101/70 (31 Mar 2022 08:57) (101/70 - 170/94)  RR: 18 (31 Mar 2022 08:57) (18 - 18)  SpO2: 99% (31 Mar 2022 08:57) (95% - 99%)    GENERAL EXAM:  Constitutional: Lying in bed, NAD.  Head: Normocephalic, atraumatic.  Extremities: No edema.    NEUROLOGICAL EXAM:  MS: Alert, eyes open spontaneously. Oriented to self, age, location, month, year. Speech is fluent, not slurred. Able to name objects and repeat. Follows commands.  CN: PERRL. VFF. EOMI. V1-V3 intact. Face symmetric. Tongue midline.   Motor: All extremities antigravity without drift.   Sensory: Intact to LT throughout. No extinction.  Reflexes: 2+ throughout. Babinski absent bilaterally.   Coordination: No dysmetria on FNF or on HTS bilaterally.  Gait: Deferred.    Labs:   cbc                      15.1   6.39  )-----------( 202      ( 31 Mar 2022 06:30 )             45.1     [id number]    135  |  100  |  10  ----------------------------<  117<H>  4.0   |  24  |  0.70    Ca    9.3      31 Mar 2022 06:30  Phos  3.0     03-31  Mg     2.1     03-31    TPro  6.5  /  Alb  3.8  /  TBili  0.5  /  DBili  x   /  AST  35  /  ALT  9<L>  /  AlkPhos  46  03-31    Cardiac Markers: CARDIAC MARKERS ( 31 Mar 2022 06:30 )  x     / x     / 326 U/L / x     / x        LIVER FUNCTIONS - ( 31 Mar 2022 06:30 )  Alb: 3.8 g/dL / Pro: 6.5 g/dL / ALK PHOS: 46 U/L / ALT: 9 U/L / AST: 35 U/L / GGT: x          MRN-[id number]    Subjective: 71 yo male seen and examined at bedside. Interview conducted in Kiswahili by attending Dr. Hayden Portillo. Per report patient was agitated overnight, ambulating around obs room and was aggressive to staff when told he could not go to the restroom by himself due to fall risk concerns. Patient denies feeling stiff overnight. Reports mild neck and back pain.    PAST MEDICAL & SURGICAL HISTORY:  Essential hypertension    Other hyperlipidemia    Parkinson disease    No significant past surgical history    FAMILY HISTORY:  No pertinent family history in first degree relatives    Social Hx:  Nonsmoker, no drug or alcohol use    Home Medications:  benztropine 0.5 mg oral tablet: 1 tab(s) orally once a day (at bedtime) (28 Mar 2022 21:51)  losartan 100 mg oral tablet: 1 tab(s) orally once a day (28 Mar 2022 21:51)  Metoprolol Tartrate 50 mg oral tablet: 1 tab QAM and 2 tabs QHS (28 Mar 2022 21:51)  pantoprazole 40 mg oral delayed release tablet: 1 tab(s) orally once a day (28 Mar 2022 21:51)  QUEtiapine 25 mg oral tablet: 1 tab(s) orally once a day (at bedtime) (28 Mar 2022 21:51)  Rytary 61.25 mg-245 mg oral capsule, extended release: 3 cap(s) orally 3 times a day (28 Mar 2022 21:51)  simvastatin 10 mg oral tablet: 1 tab(s) orally once a day (at bedtime) (28 Mar 2022 21:51)  Xanax 0.25 mg oral tablet: 1 tab(s) orally 2 times a day (28 Mar 2022 21:51)    MEDICATIONS  (STANDING):  ALPRAZolam 0.25 milliGRAM(s) Oral two times a day  aspirin  chewable 81 milliGRAM(s) Oral daily  benztropine 0.5 milliGRAM(s) Oral at bedtime  carbidopa/levodopa  25/100 1.5 Tablet(s) Oral <User Schedule>  enoxaparin Injectable 40 milliGRAM(s) SubCutaneous every 24 hours  losartan 100 milliGRAM(s) Oral daily  metoprolol tartrate 50 milliGRAM(s) Oral daily  metoprolol tartrate 100 milliGRAM(s) Oral at bedtime  pantoprazole    Tablet 40 milliGRAM(s) Oral before breakfast  QUEtiapine 25 milliGRAM(s) Oral at bedtime  simvastatin 10 milliGRAM(s) Oral at bedtime  sodium chloride 0.9%. 1000 milliLiter(s) (75 mL/Hr) IV Continuous <Continuous>    MEDICATIONS  (PRN):  acetaminophen     Tablet .. 650 milliGRAM(s) Oral every 6 hours PRN Temp greater or equal to 38C (100.4F), Mild Pain (1 - 3), Moderate Pain (4 - 6)    Allergies  No Known Allergies	    ROS: Pertinent positives above, all other ROS were reviewed and are negative.      Vital Signs Last 24 Hrs  T(C): 37.1 (31 Mar 2022 08:57), Max: 37.1 (31 Mar 2022 08:57)  T(F): 98.7 (31 Mar 2022 08:57), Max: 98.7 (31 Mar 2022 08:57)  HR: 70 (31 Mar 2022 08:57) (60 - 88)  BP: 101/70 (31 Mar 2022 08:57) (101/70 - 170/94)  RR: 18 (31 Mar 2022 08:57) (18 - 18)  SpO2: 99% (31 Mar 2022 08:57) (95% - 99%)    GENERAL EXAM:  Constitutional: Lying in bed, NAD.  Head: Normocephalic, atraumatic.  Extremities: No edema.    NEUROLOGICAL EXAM:  MS: Alert, eyes open spontaneously. Oriented to self, year. States location is "nursing home." Speech is fluent, not slurred. Follows commands.  CN: EOMI. Face symmetric.   Motor: Increased tone, LUE>RUE. No cogwheeling. Moves all extremities antigravity. Bradykinetic fine finger movements, L>R.  Sensory: Intact to LT throughout.   Coordination/Gait: Not assessed.    Labs:   cbc                      15.1   6.39  )-----------( 202      ( 31 Mar 2022 06:30 )             45.1     [id number]    135  |  100  |  10  ----------------------------<  117<H>  4.0   |  24  |  0.70    Ca    9.3      31 Mar 2022 06:30  Phos  3.0     03-31  Mg     2.1     03-31    TPro  6.5  /  Alb  3.8  /  TBili  0.5  /  DBili  x   /  AST  35  /  ALT  9<L>  /  AlkPhos  46  03-31    Cardiac Markers: CARDIAC MARKERS ( 31 Mar 2022 06:30 )  x     / x     / 326 U/L / x     / x        LIVER FUNCTIONS - ( 31 Mar 2022 06:30 )  Alb: 3.8 g/dL / Pro: 6.5 g/dL / ALK PHOS: 46 U/L / ALT: 9 U/L / AST: 35 U/L / GGT: x

## 2022-04-14 NOTE — ED PROVIDER NOTE - ATTESTATION, MLM
Anesthesia Evaluation     Patient summary reviewed and Nursing notes reviewed   NPO Solid Status: > 8 hours  NPO Liquid Status: > 8 hours           Airway   Mallampati: I  TM distance: >3 FB  Neck ROM: full  No difficulty expected  Dental - normal exam     Pulmonary - normal exam   (+) a smoker Current Abstained day of surgery,   Cardiovascular - negative cardio ROS and normal exam        Neuro/Psych- negative ROS  GI/Hepatic/Renal/Endo - negative ROS     Musculoskeletal (-) negative ROS    Abdominal  - normal exam    Bowel sounds: normal.   Substance History - negative use     OB/GYN negative ob/gyn ROS         Other        ROS/Med Hx Other: INCARCERATED      Phys Exam Other: CHIPPED UPPER INCISORS              Anesthesia Plan    ASA 2     general     intravenous induction     Anesthetic plan, all risks, benefits, and alternatives have been provided, discussed and informed consent has been obtained with: patient.    Plan discussed with CAA.        CODE STATUS:       
I have reviewed and confirmed nurses' notes for patient's medications, allergies, medical history, and surgical history.

## 2022-05-06 ENCOUNTER — APPOINTMENT (OUTPATIENT)
Dept: NEUROLOGY | Facility: CLINIC | Age: 72
End: 2022-05-06
Payer: MEDICARE

## 2022-05-06 VITALS — WEIGHT: 140 LBS | SYSTOLIC BLOOD PRESSURE: 148 MMHG | DIASTOLIC BLOOD PRESSURE: 69 MMHG | HEART RATE: 56 BPM

## 2022-05-06 PROCEDURE — 99215 OFFICE O/P EST HI 40 MIN: CPT

## 2022-05-06 NOTE — HISTORY OF PRESENT ILLNESS
[FreeTextEntry1] : The patient is a 72-year-old right-handed man who was diagnosed with Parkinson's disease in 2016 when he developed leg stiffness.  He also has a history of a right carotid dissection diagnosed in 2020.\par \par He comes referred after recent emergency room about a month ago for acute psychosis.  At the time his Rytary had increased from 195 mg 3 capsules 3 times a day to 245 mg capsules.  He developed significant dyskinesias as well as hallucinations.  In the hospital he was switched to Sinemet and discharged on that.  However, when he went home he progressively became unable to walk and had difficulty swallowing.  Thus, after a month his family put him back on Rytary 240 mg, just 1 capsule 3 times a day.  On that he is improving, able to walk, but still slow.  He does take benztropine 0.5 mg at night for mouth movements.\par \par His voice is lower, but he has no drooling.  His dysphagia is improving now he is back on Rytary.  He is independent but very slow activity living.  Walking is slow but he has not fallen.  He does have tremor of the left side.  He does have some fluctuations with wearing off", which is coupled with anxiety.\par \par His memory is good and he has no depression.  He does get anxious when the medication wears off.  He has no hallucinations.  He has no history of acting out his dreams.  He does have some constipation which she treats with milk of magnesia.  He has no orthostasis.  He has no family history of Parkinson's disease.\par \par \par \par \par \par \par \par \par \par \par

## 2022-05-06 NOTE — DISCUSSION/SUMMARY
[FreeTextEntry1] : In summary, the patient is a 72-year-old right-handed man with a history of Parkinson's disease since 2016, who was recently admitted for psychosis in the setting of Rytary increase.\par The examination was significant for left greater than right bradykinesia and rigidity with a parkinsonian tremor of the left hand.\par \par Overall I agree with previous evaluations that this is idiopathic Parkinson's disease.  There were no findings on examination of point to an atypical parkinsonian disorder.  Moreover, he does report response to levodopa, though it seems only in the form of Rytary at this point.  At the current dose he is somewhat underdosed but I discussed with the family that this stage may be fine-line between the correct dosing and dosing that causes too much dyskinesias as well as hallucinations.  \par \par As a first step I did recommend that they try the Rytary 195 mg again, taking 2 capsules 3 times a day which would be a slight increase from the current 245 mg 1 capsule.  If that is insufficient, he can also go back to the 195 mg 3 capsules 3 times a day which was his previous dose.  I did advise to try to stop the benztropine as I am not sure how much it is helping him.  He does have some mild fluctuations, so eventually something like selegiline might need to be added in.  He has no overt cognitive problems at this time, so an evaluation for depression simulation might be necessary depending how he does with more Rytary.  He is already exercising regularly which I advised him to continue.\par \par He also does have a history of a carotid dissection, for which a follow-up MRA was planned.  I have given him prescriptions for the MRA, depending on the results we can refer him back to the vascular neurology service.  He is currently taking aspirin every day which advised him to continue.\par \par I spent approximately 60 minutes with the patient and his family, examining and discussing the above findings and impression.  Follow-up will be in 5 months, sooner if new problems arise.\par

## 2022-05-06 NOTE — PHYSICAL EXAM
[Person] : oriented to person [Place] : oriented to place [Time] : oriented to time [Registration Intact] : recent registration memory intact [Naming Objects] : no difficulty naming common objects [Repeating Phrases] : no difficulty repeating a phrase [Fluency] : fluency intact [Comprehension] : comprehension intact [Reading] : reading intact [Motor Strength] : muscle strength was normal in all four extremities [Motor Handedness Right-Handed] : the patient is right hand dominant [Sensation Tactile Decrease] : light touch was intact [Sensation Vibration Decrease] : vibration was intact [Short Term Intact] : short term memory impaired [FreeTextEntry4] : 2/3 dleayed recall.  [FreeTextEntry1] : Facial expression is mildly reduced and voice was low.  Extraocular movements were intact with normal saccades and no square wave jerks seen.  Shoulder shrug was decreased on the left.  Tone was increased at the neck as well as the left upper and lower extremity.  Rapid alternating movements and finger taps are slow bilaterally, worse on the left.  Foot tap was normal but toe tap was slowed on the left.  He was able to get up from the chair without using his arms. \par Gait was slightly wide-based with shortened stride and mildly stooped posture.  He had absent right arm swing.  Pull test was negative.  He has an intermittent rest tremor of the left hand which was reemerging when he was walking.  There was no action tremor.

## 2022-08-17 ENCOUNTER — APPOINTMENT (OUTPATIENT)
Dept: NEUROLOGY | Facility: CLINIC | Age: 72
End: 2022-08-17

## 2022-08-22 ENCOUNTER — APPOINTMENT (OUTPATIENT)
Dept: NEUROLOGY | Facility: CLINIC | Age: 72
End: 2022-08-22

## 2022-08-22 VITALS
HEART RATE: 68 BPM | WEIGHT: 140 LBS | BODY MASS INDEX: 23.32 KG/M2 | HEIGHT: 65 IN | TEMPERATURE: 98 F | DIASTOLIC BLOOD PRESSURE: 74 MMHG | SYSTOLIC BLOOD PRESSURE: 130 MMHG

## 2022-08-22 PROCEDURE — 99214 OFFICE O/P EST MOD 30 MIN: CPT

## 2022-08-22 NOTE — DISCUSSION/SUMMARY
[FreeTextEntry1] : In summary, the patient is a 72-year-old right-handed man with a history of Parkinson's disease since 2016, who was recently admitted for psychosis in the setting of Rytary increase.\par The examination was significant for left greater than right bradykinesia and rigidity with a parkinsonian tremor of the left hand.\par \par Overall I agree with previous evaluations that this is idiopathic Parkinson's disease.  There were no findings on examination of point to an atypical parkinsonian disorder.  Moreover, he does report response to levodopa, though it seems only in the form of Rytary at this point.  At the current dose he is somewhat underdosed but I discussed with the family that this stage may be fine-line between the correct dosing and dosing that causes too much dyskinesias as well as hallucinations.  \par \par 1. Would reduce rytary to 2 caps tid (omit 4 am dose) to see if hallucinations improve. Continue benztropine for now\par 2. If not may need nuplazid\par 3. Exercise \par 4. He has no overt cognitive problems at this time\par 5. He also does have a history of a carotid dissection, refer him back to the vascular neurology service.  He is currently taking aspirin every day which advised him to continue.\par \par I spent approximately 30 minutes with the patient and his family, examining and discussing the above findings and impression.  Follow-up will be in 5 months, sooner if new problems arise.

## 2022-08-22 NOTE — PHYSICAL EXAM
[Person] : oriented to person [Place] : oriented to place [Time] : oriented to time [Short Term Intact] : short term memory impaired [Registration Intact] : recent registration memory intact [Naming Objects] : no difficulty naming common objects [Repeating Phrases] : no difficulty repeating a phrase [Fluency] : fluency intact [Comprehension] : comprehension intact [Reading] : reading intact [Motor Strength] : muscle strength was normal in all four extremities [Motor Handedness Right-Handed] : the patient is right hand dominant [Sensation Tactile Decrease] : light touch was intact [Sensation Vibration Decrease] : vibration was intact [FreeTextEntry4] : 2/3 dleayed recall.  [FreeTextEntry1] : Seen two hours after dose. \par \par Facial expression is mildly reduced and voice was low.  Extraocular movements were intact with normal saccades and no square wave jerks seen.  \par \par Shoulder shrug was decreased on the left.  Tone was increased at the neck as well as the left upper and lower extremity.  Rapid alternating movements and finger taps are slow bilaterally, worse on the left.  Foot tap was normal but toe tap was slowed on the left.  \par \par He was able to get up from the chair without using his arms. Gait was slightly wide-based with shortened stride and mildly stooped posture.  He had absent right arm swing.  Pull test was negative. \par \par No tremors, mild LID

## 2022-08-22 NOTE — HISTORY OF PRESENT ILLNESS
[FreeTextEntry1] : The patient is a 72-year-old right-handed man who was diagnosed with Parkinson's disease in 2016 when he developed leg stiffness.  He also has a history of a right carotid dissection diagnosed in 2020.\par \par He initially came referred after recent emergency room about a month ago for acute psychosis.  At the time his Rytary had increased from 195 mg 3 capsules 3 times a day to 245 mg capsules.  He developed significant dyskinesias as well as hallucinations.  In the hospital he was switched to Sinemet and discharged on that.  However, when he went home he progressively became unable to walk and had difficulty swallowing.  Thus, after a month his family put him back on Rytary 240 mg, just 1 capsule 3 times a day.  On that he is improving, able to walk, but still slow.  He does take benztropine 0.5 mg at night for mouth movements.\par \par His voice is lower, but he has no drooling.  His dysphagia is improving now he is back on Rytary. He does have some fluctuations with wearing off", which is coupled with anxiety.\par \par \par \par 1. Repeat MRI showed persistent dissection was referred back to neurovascular, has not seen again. \par 2. Now on Rytary 195 - 2 capules 4x/day (4-10-4-9). Still on benztropine 0.5 mg at night.  He is independent but very slow activity living.  Walking is slow but he has not fallen.  He does have tremor of the left side.  \par 3. Does have some hallucinations, without insight. His memory is good and he has no depression.  He does get anxious when the medication wears off. Some LID\par 4. He has no history of acting out his dreams.  He does have some constipation which she treats with milk of magnesia.  He has no orthostasis.\par \par \par \par \par \par \par \par

## 2022-09-28 NOTE — PHYSICAL THERAPY INITIAL EVALUATION ADULT - PREDICTED DURATION OF THERAPY (DAYS/WKS), PT EVAL
2 weeks
PAST SURGICAL HISTORY:  History of 2  sections 17 & 20 yeasr    History of hysteroscopy removal endometrial benign poly 2020,

## 2022-11-09 ENCOUNTER — APPOINTMENT (OUTPATIENT)
Dept: NEUROLOGY | Facility: CLINIC | Age: 72
End: 2022-11-09

## 2022-11-09 VITALS
HEART RATE: 60 BPM | SYSTOLIC BLOOD PRESSURE: 114 MMHG | BODY MASS INDEX: 23.32 KG/M2 | WEIGHT: 140 LBS | DIASTOLIC BLOOD PRESSURE: 67 MMHG | HEIGHT: 65 IN

## 2022-11-09 PROCEDURE — 99214 OFFICE O/P EST MOD 30 MIN: CPT

## 2022-11-09 NOTE — PHYSICAL EXAM
[FreeTextEntry1] : Neurologic: \par Mental Status:. 1/3 dleayed recall. \par Orientation: oriented to person, oriented to place and oriented to time. \par Memory: short term memory impaired, but recent registration memory intact. \par Language: no difficulty naming common objects, no difficulty repeating a phrase, fluency intact, comprehension intact and reading intact. \par Motor: muscle strength was normal in all four extremities. \par Motor Strength:. the patient is right hand dominant. \par Movement Disorders Physical Exam: Seen two hours after dose. \par \par Facial expression is mildly reduced and voice was low. Extraocular movements were intact with normal saccades and no square wave jerks seen. \par \par Shoulder shrug was decreased on the left. Tone was increased at the neck as well as the left upper and lower extremity. Rapid alternating movements and finger taps are slow bilaterally, worse on the left. Foot tap was normal but toe tap was slowed on the left. \par \par He was able to get up from the chair without using his arms. Gait was slightly wide-based with shortened stride and mildly stooped posture. He had absent right arm swing. Pull test was negative. \par \par No tremors, mild LID. \par Sensory exam: light touch was intact and vibration was intact.

## 2022-11-09 NOTE — HISTORY OF PRESENT ILLNESS
[FreeTextEntry1] : The patient is a 72-year-old right-handed man who was diagnosed with Parkinson's disease in 2016 when he developed leg stiffness. He also has a history of a right carotid dissection diagnosed in 2020.\par \par He initially came referred after recent emergency room about a month ago for acute psychosis. At the time his Rytary had increased from 195 mg 3 capsules 3 times a day to 245 mg capsules. He developed significant dyskinesias as well as hallucinations. In the hospital he was switched to Sinemet and discharged on that. However, when he went home he progressively became unable to walk and had difficulty swallowing. Thus, after a month his family put him back on Rytary 240 mg, just 1 capsule 3 times a day. On that he is improving, able to walk, but still slow. He does take benztropine 0.5 mg at night for mouth movements.\par \par Overall he is doing well in PD symptoms. He takes Rytary 8 am  1 pm   6 pm. Slower in the morning, shuffles at that time but after taking pills he is faster and seems like he does not have any disease, described per wife. Wearing off 20 m -30 minutes before the next dose. His voice is lower, but he has no drooling. His dysphagia is improving now he is back on Rytary. He does have some fluctuations with wearing off", which is coupled with anxiety. ADLs independent. \par \par \par 1. Repeat MRI showed persistent dissection was referred back to neurovascular, has not seen again. \par 2. Now on Rytary 195 - 2 capules 4x/day (4-10-4-9). Still on benztropine 0.5 mg at night. He is independent but very slow activity living. Walking is slow but he has not fallen. He does have tremor of the left side. \par 3. No hallucinations.  His memory is good and he has no depression. He does get anxious when the medication wears off. Some LID\par 4. He sleeps well. No vivid dreaming. He has no history of acting out his dreams. He does have some constipation which she treats with milk of magnesia. He has no orthostasis.\par 5, Meds: Rytari, Benztropine. \par

## 2022-11-09 NOTE — DISCUSSION/SUMMARY
[FreeTextEntry1] : In summary, the patient is a 72-year-old right-handed man with a history of Parkinson's disease since 2016, who was recently admitted for psychosis in the setting of Rytary increase.\par The examination was significant for left greater than right bradykinesia and rigidity with a parkinsonian tremor of the left hand.\par \par Overall I agree with previous evaluations that this is idiopathic Parkinson's disease. There were no findings on examination of point to an atypical parkinsonian disorder. Moreover, he does report response to levodopa, though it seems only in the form of Rytary at this point. At the current dose he is somewhat underdosed but I discussed with the family that this stage may be fine-line between the correct dosing and dosing that causes too much dyskinesias as well as hallucinations. \par \par He is doing well with Rytari tid with 20-30 minutes of wearing off, we need to cover that time of wearing off by making the doses in closer times.  \par \par 1. Continue rytary 2 caps tid 8 am 12.30 pm 5 pm. and take 1 cap at 9.30-10pm. \par 2. Continue benztropine for now\par ( In future, may need nuplazid)\par 3. Continue Exercise \par 4. He has no overt cognitive problems at this time\par 5. He also does have a history of a carotid dissection, refer him back to the vascular neurology service. He is currently taking aspirin every day which advised him to continue.\par \par I spent approximately 30 minutes with the patient and his family, examining and discussing the above findings and impression. Follow-up will be in 5 months, sooner if new problems arise.

## 2023-03-07 ENCOUNTER — APPOINTMENT (OUTPATIENT)
Dept: NEUROLOGY | Facility: CLINIC | Age: 73
End: 2023-03-07
Payer: MEDICARE

## 2023-03-07 VITALS
SYSTOLIC BLOOD PRESSURE: 121 MMHG | WEIGHT: 147 LBS | HEART RATE: 52 BPM | HEIGHT: 65 IN | BODY MASS INDEX: 24.49 KG/M2 | DIASTOLIC BLOOD PRESSURE: 67 MMHG

## 2023-03-07 DIAGNOSIS — I67.1 CEREBRAL ANEURYSM, NONRUPTURED: ICD-10-CM

## 2023-03-07 DIAGNOSIS — Z86.79 PERSONAL HISTORY OF OTHER DISEASES OF THE CIRCULATORY SYSTEM: ICD-10-CM

## 2023-03-07 PROCEDURE — 99214 OFFICE O/P EST MOD 30 MIN: CPT

## 2023-03-07 PROCEDURE — 99215 OFFICE O/P EST HI 40 MIN: CPT

## 2023-03-07 RX ORDER — SIMVASTATIN 10 MG/1
10 TABLET, FILM COATED ORAL
Refills: 0 | Status: ACTIVE | COMMUNITY

## 2023-03-07 RX ORDER — TELMISARTAN 80 MG/1
80 TABLET ORAL
Refills: 0 | Status: ACTIVE | COMMUNITY

## 2023-03-07 RX ORDER — BISOPROLOL FUMARATE 5 MG/1
5 TABLET, FILM COATED ORAL
Refills: 0 | Status: ACTIVE | COMMUNITY

## 2023-03-07 RX ORDER — AMLODIPINE BESYLATE 5 MG/1
5 TABLET ORAL
Refills: 0 | Status: ACTIVE | COMMUNITY

## 2023-03-07 NOTE — HISTORY OF PRESENT ILLNESS
[FreeTextEntry1] : The patient is a 72-year-old right-handed man who was diagnosed with Parkinson's disease in 2016 when he developed leg stiffness. He also has a history of a right carotid dissection diagnosed in 2020 and following Dr. Baca.\par \par He initially came referred after recent emergency room about a month ago for acute psychosis. At the time his Rytary had increased from 195 mg 3 capsules 3 times a day to 245 mg capsules. He developed significant dyskinesias as well as hallucinations. In the hospital he was switched to Sinemet and discharged on that. However, when he went home he progressively became unable to walk and had difficulty swallowing. Thus, after a month his family put him back on Rytary 240 mg, just 1 capsule 3 times a day. On that he is improving, able to walk, but still slow. He does take benztropine 0.5 mg at night for mouth movements.\par ======================================\par \par Since last visit in Nov 2022, he reports feeling a little better. Wife is present with him.\par - sometimes gets confused after 1 hour of taking Rytary for the last 2 weeks. forgets which day it is, and then returns to normal after 1-2 hrs. \par \par - He takes Rytary 195mg 2 capsules 8 am  1 pm   6 pm. \par - Still on benztropine 2 mg at night.\par - walking is good after initial slow pace. walks 4-5miles 3-4x/week. Doing weights at home. Doing PT 1x/week. has a pinched nerve on the right neck and had a steroid shot 1 week ago in his right shoulder. no falls\par - Sometimes see's people trying to kill him rarely, and since 2 weeks more often. \par - Unclear LD kinetics per wife's explanation. \par - ADLs independent. \par - some anxiety. \par His memory is good and he has no depression. \par - He sleeps well. No vivid dreaming. He has no history of acting out his dreams. He does have some constipation which she treats with milk of magnesia. He has no orthostasis.

## 2023-03-07 NOTE — DISCUSSION/SUMMARY
[FreeTextEntry1] : Mr. Lau is a 73 year-old male with a PMH of PD (followed by Dr. Luciano) and HTN who presented today for follow-up of right cervical ICA dissection and 3 x 2.5mm saccular MCA aneurysm. We discussed the risk of having an aneurysm and based on its size and location, there is low lifetime risk of rupture. The risk of rupture is lower than his risk for treatment. Plan to perform an MRA head and MRA neck w/wo in one year and see me after testing. Continue ASA 81 mg QD for stroke prevention. All of their questions and concerns were addressed. \par

## 2023-03-07 NOTE — DISCUSSION/SUMMARY
[FreeTextEntry1] : In summary, the patient is a 72-year-old right-handed man with a history of Parkinson's disease since 2016, who was recently admitted for psychosis in the setting of Rytary increase.\par The examination was significant for left greater than right bradykinesia and rigidity with a parkinsonian tremor of the left hand.\par \par Overall, I agree with previous evaluations that this is idiopathic Parkinson's disease. There were no findings on examination of point to an atypical parkinsonian disorder. Moreover, he does report response to levodopa, though it seems only in the form of Rytary at this point. \par \par For the last 2 weeks he has had increased paranoia of someone trying to kill him and hallucinations of someone in the tree's. He has increased confusion of events 1 hour after taking the Rytary lasting 1-2 hours before returning to normal. No reported changes in life events or being sick 2 weeks ago. No urinary complaints making UTI unlikely in a male. potentially this rytary dose is causing him to have paranoia and hallucinations and will try to decrease the dose. Discussed potentially his motor sx's would worsen with the decrease and they should update us in a few weeks. \par \par \par 1. Decrease rytary from 195mg 2 caps to 145mg 2 caps TID\par 2. Continue benztropine for now\par ( In future, may need nuplazid)\par 3. Continue Exercise \par 4. He also does have a history of a carotid dissection, and seeing Dr. Baca \par \par I spent approximately 30 minutes with the patient and his family, examining and discussing the above findings and impression. Follow-up will be in 5 months, sooner if new problems arise. \par \par Case discussed and pt examined with movement attending Dr. Luciano \par \par Mayito Eugene MD\par Movement Fellow

## 2023-03-07 NOTE — PHYSICAL EXAM
[FreeTextEntry1] : GENERAL PHYSICAL EXAM:\par GEN: no distress, normal affect\par EYES: sclera white, conjunctiva clear, no nystagmus\par CV: normal rhythm\par PULM: no respiratory distress, normal rhythm and effort\par EXT: no edema, no cyanosis\par MSK: muscle tone and strength normal\par SKIN: warm, dry, no rash or lesion on exposed skin \par \par NEUROLOGICAL EXAM:\par Mental Status\par Orientation: alert and oriented to person, place, time, and situation\par Language: clear and fluent, intact comprehension and repetition\par \par Cranial Nerves\par II: visual fields full to confrontation \par III, IV, VI: PERRL, EOMI\par V, VII: facial sensation and movement intact and symmetric \par VIII: hearing intact \par IX, X: uvula midline, soft palate elevates normally \par XI: BL shoulder shrug intact \par XII: tongue midline\par \par Motor\par EF/EE: 5 (R), 5 (L)\par WF/WE: 5 (R), 5 (L)\par hand : 5 (R), 5 (L)\par HF/HE: 5 (R), 5 (L)\par KF/KE: 5 (R), 5 (L)\par DF/PF: 5 (R), 5 (L) \par Tone and bulk are normal in upper and lower limbs\par No pronator drift\par \par Sensation\par Intact to light touch in all 4 EXTs\par \par Coordination\par Normal FTN bilaterally\par \par Gait\par Shortened stride\par

## 2023-03-07 NOTE — PHYSICAL EXAM
[FreeTextEntry1] : Neurologic: \par Orientation: oriented to person, oriented to place and not oriented to time month or year\par \par Movement Disorders Physical Exam: Seen 2-3 hours after dose. \par \par Facial expression is mildly reduced and voice was low. Extraocular movements were intact with normal saccades and no square wave jerks seen. \par \par Shoulder shrug was decreased on the left. Tone was increased at the neck as well as the left upper and lower extremity. Rapid alternating movements and finger taps are slow bilaterally, worse on the left. Foot tap was normal but toe tap was slowed on the left. \par \par He was able to get up from the chair without using his arms. Gait with shortened stride and mildly stooped posture. He had absent bilateral arm swing. Pull test was negative. \par \par slight tremors of the left upper and lower extremity, No LID

## 2023-03-07 NOTE — HISTORY OF PRESENT ILLNESS
[FreeTextEntry1] : Mr. Lau is a 73 year-old male with a PMH of PD (followed by Dr. Luciano) and HTN who presents to the office today for follow-up. He was admitted to [facility] on 7/22/20 for neck pain, facial droop, slurred speech, imbalance, left arm and leg weakness and loss of sensation and confusion. Most of his symptoms were transient, however, the neck pain and left arm numbness have persisted. Neuroimaging from [facility] was negative for stroke but CTA showed a dilated right cervical ICA dissection and a right MCA 3 x 2.5 mm saccular aneurysm. Repeat MRA head showed aneurysm is stable in size. Unable to visualize right ICA dissection completely, but the distal portion looks the same. I personally reviewed his imaging.

## 2023-03-07 NOTE — REVIEW OF SYSTEMS
[As Noted in HPI] : as noted in HPI [Numbness] : numbness [Tingling] : tingling [Abnormal Sensation] : an abnormal sensation [Difficulty Walking] : difficulty walking [Fever] : no fever [Chills] : no chills [Change In Personality] : no personality change [Confused or Disoriented] : no confusion [Facial Weakness] : no facial weakness [Arm Weakness] : no arm weakness [Hand Weakness] : no hand weakness [Leg Weakness] : no leg weakness [Seizures] : no convulsions [Dizziness] : no dizziness [Eyesight Problems] : no eyesight problems [Loss Of Hearing] : no hearing loss [Chest Pain] : no chest pain [Shortness Of Breath] : no shortness of breath

## 2023-10-06 ENCOUNTER — APPOINTMENT (OUTPATIENT)
Dept: NEUROLOGY | Facility: CLINIC | Age: 73
End: 2023-10-06
Payer: MEDICARE

## 2023-10-06 PROCEDURE — 99214 OFFICE O/P EST MOD 30 MIN: CPT

## 2024-04-08 ENCOUNTER — APPOINTMENT (OUTPATIENT)
Dept: NEUROLOGY | Facility: CLINIC | Age: 74
End: 2024-04-08

## 2024-06-12 ENCOUNTER — APPOINTMENT (OUTPATIENT)
Dept: NEUROLOGY | Facility: CLINIC | Age: 74
End: 2024-06-12
Payer: MEDICARE

## 2024-06-12 VITALS
SYSTOLIC BLOOD PRESSURE: 148 MMHG | HEART RATE: 67 BPM | DIASTOLIC BLOOD PRESSURE: 81 MMHG | WEIGHT: 147 LBS | HEIGHT: 65 IN | BODY MASS INDEX: 24.49 KG/M2

## 2024-06-12 DIAGNOSIS — G20.A1 PARKINSON'S DISEASE WITHOUT DYSKINESIA, WITHOUT MENTION OF FLUCTUATIONS: ICD-10-CM

## 2024-06-12 DIAGNOSIS — G24.01 DRUG INDUCED SUBACUTE DYSKINESIA: ICD-10-CM

## 2024-06-12 DIAGNOSIS — I77.71 DISSECTION OF CAROTID ARTERY: ICD-10-CM

## 2024-06-12 DIAGNOSIS — F32.A ANXIETY DISORDER, UNSPECIFIED: ICD-10-CM

## 2024-06-12 DIAGNOSIS — T42.8X5A DRUG INDUCED SUBACUTE DYSKINESIA: ICD-10-CM

## 2024-06-12 DIAGNOSIS — F41.9 ANXIETY DISORDER, UNSPECIFIED: ICD-10-CM

## 2024-06-12 PROCEDURE — 99214 OFFICE O/P EST MOD 30 MIN: CPT

## 2024-06-12 PROCEDURE — G2211 COMPLEX E/M VISIT ADD ON: CPT

## 2024-06-12 RX ORDER — BENZTROPINE MESYLATE 1 MG/1
1 TABLET ORAL
Qty: 180 | Refills: 3 | Status: ACTIVE | COMMUNITY
Start: 1900-01-01 | End: 1900-01-01

## 2024-06-12 RX ORDER — LEVODOPA AND CARBIDOPA 145; 36.25 MG/1; MG/1
36.25-145 CAPSULE, EXTENDED RELEASE ORAL
Qty: 360 | Refills: 3 | Status: ACTIVE | COMMUNITY
Start: 1900-01-01 | End: 1900-01-01

## 2024-06-12 RX ORDER — LEVODOPA AND CARBIDOPA 195; 48.75 MG/1; MG/1
48.75-195 CAPSULE, EXTENDED RELEASE ORAL
Qty: 270 | Refills: 3 | Status: ACTIVE | COMMUNITY
Start: 2024-06-12 | End: 1900-01-01

## 2024-06-12 NOTE — HISTORY OF PRESENT ILLNESS
[FreeTextEntry1] : The patient is a 74-year-old right-handed man who was diagnosed with Parkinson's disease in 2016 when he developed leg stiffness. He also has a history of a right carotid dissection diagnosed in 2020 and following Dr. Baca. He initially came referred after recent emergency room about a month ago for acute psychosis. At the time his Rytary had increased from 195 mg 3 capsules 3 times a day to 245 mg capsules. He developed significant dyskinesias as well as hallucinations. In the hospital he was switched to Sinemet and discharged on that. However, when he went home he progressively became unable to walk and had difficulty swallowing. Thus, after a month his family put him back on Rytary 240 mg, just 1 capsule 3 times a day. On that he is improving, able to walk, but still slow. He does take benztropine 0.5 mg at night for mouth movements. ======================================  Since last visit:   1. Now on Rytary 195 + 145 capules 3x/day (9-2-7). Still on benztropine 2 mg at night. He is independent but very slow activity living. Walking is slow but he has not fallen. He does have tremor of the left side. Some LID, not bothersome.  2. Walking and balance ok when ON. No falls. Walks 4-5miles 3-4x/week. Doing weights at home. ADLs somewhat independent, needs help with showering.   3. Less hallucinations. His memory is good and he has no depression. He does get anxious when the medication wears off.  4. He has no history of acting out his dreams. He does have some constipation which she treats with milk of magnesia. He has

## 2024-06-12 NOTE — DISCUSSION/SUMMARY
[FreeTextEntry1] : In summary, the patient is a 74-year-old right-handed man with a history of Parkinson's disease since 2016, The examination was significant for left greater than right bradykinesia and rigidity with a parkinsonian tremor of the left hand.  Overall, I agree with previous evaluations that this is idiopathic Parkinson's disease. There were no findings on examination of point to an atypical parkinsonian disorder. Moreover, he does report response to levodopa, though it seems only in the form of Rytary at this point.   1. when ON he doing well, when OFF, can barely function. Continue Rytary 2 caps (195+ 145mg) TID as that seems to strike the right balance between confusion and mobility but ry to lower interval to 4h. Add 145 in the evening.  2. Continue benztropine for now, lower to 1 mg (In future, may need nuplazid) 3. Continue Exercise  4. He also does have a history of a carotid dissection, and seeing Dr. Baca   I spent approximately 30 minutes with the patient and his family, examining and discussing the above findings and impression. Follow-up will be in 5 months, sooner if new problems arise.   Case discussed and pt examined with movement attending Dr. Ankita Eugene MD Movement Fellow

## 2024-06-12 NOTE — PHYSICAL EXAM
[Person] : oriented to person [Naming Objects] : no difficulty naming common objects [Fluency] : fluency intact [FreeTextEntry4] : When OFF, barely able to speak [FreeTextEntry1] : Movement Disorders Physical Exam: Seen just after dose, OFF  Facial expression is mildly reduced and voice was very low. Extraocular movements were intact with normal saccades and no square wave jerks seen.   Shoulder shrug was decreased on the left. Tone was increased at the neck as well as the left upper and lower extremity. Rapid alternating movements and finger taps are slow bilaterally, worse on the left. Foot tap was reduced on left>right.   He was unable to get up from the chair without using his arms.

## 2025-01-08 ENCOUNTER — APPOINTMENT (OUTPATIENT)
Dept: NEUROLOGY | Facility: CLINIC | Age: 75
End: 2025-01-08
Payer: MEDICARE

## 2025-01-08 VITALS
WEIGHT: 148 LBS | HEART RATE: 55 BPM | BODY MASS INDEX: 24.66 KG/M2 | DIASTOLIC BLOOD PRESSURE: 76 MMHG | SYSTOLIC BLOOD PRESSURE: 122 MMHG | HEIGHT: 65 IN

## 2025-01-08 DIAGNOSIS — G20.A1 PARKINSON'S DISEASE WITHOUT DYSKINESIA, WITHOUT MENTION OF FLUCTUATIONS: ICD-10-CM

## 2025-01-08 DIAGNOSIS — I77.71 DISSECTION OF CAROTID ARTERY: ICD-10-CM

## 2025-01-08 DIAGNOSIS — F32.A ANXIETY DISORDER, UNSPECIFIED: ICD-10-CM

## 2025-01-08 DIAGNOSIS — F41.9 ANXIETY DISORDER, UNSPECIFIED: ICD-10-CM

## 2025-01-08 PROCEDURE — G2211 COMPLEX E/M VISIT ADD ON: CPT

## 2025-01-08 PROCEDURE — 99214 OFFICE O/P EST MOD 30 MIN: CPT

## 2025-01-08 RX ORDER — LEVODOPA AND CARBIDOPA 145; 36.25 MG/1; MG/1
36.25-145 CAPSULE, EXTENDED RELEASE ORAL
Qty: 360 | Refills: 3 | Status: ACTIVE | COMMUNITY
Start: 2025-01-08 | End: 1900-01-01

## 2025-01-08 RX ORDER — RASAGILINE 1 MG/1
1 TABLET ORAL
Qty: 30 | Refills: 5 | Status: ACTIVE | COMMUNITY
Start: 2025-01-08 | End: 1900-01-01

## 2025-03-13 NOTE — CONSULT NOTE ADULT - PROVIDER SPECIALTY LIST ADULT
Please see messages and attached BP readings, and advise.  Also see pended refill, sign if to continue this or the Amlodipine?  
Critical Care
Vascular Surgery
Neurology

## 2025-07-25 ENCOUNTER — APPOINTMENT (OUTPATIENT)
Dept: NEUROLOGY | Facility: CLINIC | Age: 75
End: 2025-07-25
Payer: MEDICARE

## 2025-07-25 VITALS
WEIGHT: 148 LBS | DIASTOLIC BLOOD PRESSURE: 61 MMHG | HEART RATE: 57 BPM | BODY MASS INDEX: 24.66 KG/M2 | HEIGHT: 65 IN | SYSTOLIC BLOOD PRESSURE: 107 MMHG

## 2025-07-25 DIAGNOSIS — T42.8X5A DRUG INDUCED SUBACUTE DYSKINESIA: ICD-10-CM

## 2025-07-25 DIAGNOSIS — F41.9 ANXIETY DISORDER, UNSPECIFIED: ICD-10-CM

## 2025-07-25 DIAGNOSIS — G20.A1 PARKINSON'S DISEASE WITHOUT DYSKINESIA, WITHOUT MENTION OF FLUCTUATIONS: ICD-10-CM

## 2025-07-25 DIAGNOSIS — F32.A ANXIETY DISORDER, UNSPECIFIED: ICD-10-CM

## 2025-07-25 DIAGNOSIS — G24.01 DRUG INDUCED SUBACUTE DYSKINESIA: ICD-10-CM

## 2025-07-25 DIAGNOSIS — I77.71 DISSECTION OF CAROTID ARTERY: ICD-10-CM

## 2025-07-25 PROCEDURE — G2211 COMPLEX E/M VISIT ADD ON: CPT

## 2025-07-25 PROCEDURE — 99214 OFFICE O/P EST MOD 30 MIN: CPT

## 2025-08-04 RX ORDER — PIMAVANSERIN TARTRATE 34 MG/1
34 CAPSULE ORAL
Qty: 30 | Refills: 5 | Status: ACTIVE | COMMUNITY
Start: 2025-07-25 | End: 1900-01-01